# Patient Record
Sex: MALE | Race: WHITE | NOT HISPANIC OR LATINO | Employment: FULL TIME | URBAN - METROPOLITAN AREA
[De-identification: names, ages, dates, MRNs, and addresses within clinical notes are randomized per-mention and may not be internally consistent; named-entity substitution may affect disease eponyms.]

---

## 2017-04-19 ENCOUNTER — ALLSCRIPTS OFFICE VISIT (OUTPATIENT)
Dept: OTHER | Facility: OTHER | Age: 52
End: 2017-04-19

## 2017-05-03 ENCOUNTER — GENERIC CONVERSION - ENCOUNTER (OUTPATIENT)
Dept: OTHER | Facility: OTHER | Age: 52
End: 2017-05-03

## 2017-05-16 ENCOUNTER — LAB CONVERSION - ENCOUNTER (OUTPATIENT)
Dept: OTHER | Facility: OTHER | Age: 52
End: 2017-05-16

## 2017-05-16 LAB
DEPRECATED RDW RBC AUTO: 13.8 % (ref 11–15)
HCT VFR BLD AUTO: 45.8 % (ref 38.5–50)
HGB BLD-MCNC: 15.4 G/DL (ref 13.2–17.1)
MCH RBC QN AUTO: 29.3 PG (ref 27–33)
MCHC RBC AUTO-ENTMCNC: 33.7 G/DL (ref 32–36)
MCV RBC AUTO: 86.7 FL (ref 80–100)
PLATELET # BLD AUTO: 305 THOUSAND/UL (ref 140–400)
PMV BLD AUTO: 7.6 FL (ref 7.5–12.5)
RBC # BLD AUTO: 5.28 MILLION/UL (ref 4.2–5.8)
WBC # BLD AUTO: 5.7 THOUSAND/UL (ref 3.8–10.8)

## 2017-05-17 ENCOUNTER — LAB CONVERSION - ENCOUNTER (OUTPATIENT)
Dept: OTHER | Facility: OTHER | Age: 52
End: 2017-05-17

## 2017-05-17 LAB
ALT SERPL W P-5'-P-CCNC: 55 U/L (ref 9–46)
AST SERPL W P-5'-P-CCNC: 29 U/L (ref 10–35)
BUN SERPL-MCNC: 14 MG/DL (ref 7–25)
BUN/CREA RATIO (HISTORICAL): ABNORMAL (CALC) (ref 6–22)
CALCIUM SERPL-MCNC: 9.5 MG/DL (ref 8.6–10.3)
CHLORIDE SERPL-SCNC: 105 MMOL/L (ref 98–110)
CHOLEST SERPL-MCNC: 187 MG/DL (ref 125–200)
CHOLEST/HDLC SERPL: 3.4 (CALC)
CO2 SERPL-SCNC: 28 MMOL/L (ref 20–31)
CREAT SERPL-MCNC: 0.94 MG/DL (ref 0.7–1.33)
DEPRECATED RDW RBC AUTO: 13.8 % (ref 11–15)
EGFR AFRICAN AMERICAN (HISTORICAL): 108 ML/MIN/1.73M2
EGFR-AMERICAN CALC (HISTORICAL): 93 ML/MIN/1.73M2
GLUCOSE (HISTORICAL): 101 MG/DL (ref 65–99)
HCT VFR BLD AUTO: 45.8 % (ref 38.5–50)
HDLC SERPL-MCNC: 55 MG/DL
HGB BLD-MCNC: 15.4 G/DL (ref 13.2–17.1)
LDL CHOLESTEROL DIRECT (HISTORICAL): 111 MG/DL
MCH RBC QN AUTO: 29.3 PG (ref 27–33)
MCHC RBC AUTO-ENTMCNC: 33.7 G/DL (ref 32–36)
MCV RBC AUTO: 86.7 FL (ref 80–100)
NON-HDL-CHOL (CHOL-HDL) (HISTORICAL): 132 MG/DL (CALC)
PLATELET # BLD AUTO: 305 THOUSAND/UL (ref 140–400)
PMV BLD AUTO: 7.6 FL (ref 7.5–12.5)
POTASSIUM SERPL-SCNC: 4.6 MMOL/L (ref 3.5–5.3)
RBC # BLD AUTO: 5.28 MILLION/UL (ref 4.2–5.8)
SODIUM SERPL-SCNC: 142 MMOL/L (ref 135–146)
TRIGL SERPL-MCNC: 128 MG/DL
WBC # BLD AUTO: 5.7 THOUSAND/UL (ref 3.8–10.8)

## 2017-05-18 ENCOUNTER — GENERIC CONVERSION - ENCOUNTER (OUTPATIENT)
Dept: OTHER | Facility: OTHER | Age: 52
End: 2017-05-18

## 2017-05-18 LAB — LDL CHOLESTEROL DIRECT (HISTORICAL): 111

## 2017-08-22 ENCOUNTER — ALLSCRIPTS OFFICE VISIT (OUTPATIENT)
Dept: OTHER | Facility: OTHER | Age: 52
End: 2017-08-22

## 2017-08-22 DIAGNOSIS — D22.9 MELANOCYTIC NEVUS: ICD-10-CM

## 2017-09-22 ENCOUNTER — GENERIC CONVERSION - ENCOUNTER (OUTPATIENT)
Dept: OTHER | Facility: OTHER | Age: 52
End: 2017-09-22

## 2017-11-10 ENCOUNTER — LAB CONVERSION - ENCOUNTER (OUTPATIENT)
Dept: OTHER | Facility: OTHER | Age: 52
End: 2017-11-10

## 2017-11-10 LAB
ALT SERPL W P-5'-P-CCNC: 60 U/L (ref 9–46)
AST SERPL W P-5'-P-CCNC: 25 U/L (ref 10–35)
BUN SERPL-MCNC: 15 MG/DL (ref 7–25)
BUN/CREA RATIO (HISTORICAL): NORMAL (CALC) (ref 6–22)
CALCIUM SERPL-MCNC: 9.1 MG/DL (ref 8.6–10.3)
CHLORIDE SERPL-SCNC: 102 MMOL/L (ref 98–110)
CHOLEST SERPL-MCNC: 171 MG/DL
CHOLEST/HDLC SERPL: 3.4 (CALC)
CO2 SERPL-SCNC: 27 MMOL/L (ref 20–31)
CREAT SERPL-MCNC: 0.95 MG/DL (ref 0.7–1.33)
EGFR AFRICAN AMERICAN (HISTORICAL): 106 ML/MIN/1.73M2
EGFR-AMERICAN CALC (HISTORICAL): 92 ML/MIN/1.73M2
GLUCOSE (HISTORICAL): 90 MG/DL (ref 65–99)
HDLC SERPL-MCNC: 51 MG/DL
LDL CHOLESTEROL (HISTORICAL): 98 MG/DL (CALC)
NON-HDL-CHOL (CHOL-HDL) (HISTORICAL): 120 MG/DL (CALC)
POTASSIUM SERPL-SCNC: 4.2 MMOL/L (ref 3.5–5.3)
SODIUM SERPL-SCNC: 140 MMOL/L (ref 135–146)
TRIGL SERPL-MCNC: 123 MG/DL

## 2017-12-01 ENCOUNTER — GENERIC CONVERSION - ENCOUNTER (OUTPATIENT)
Dept: OTHER | Facility: OTHER | Age: 52
End: 2017-12-01

## 2017-12-01 DIAGNOSIS — R79.89 OTHER SPECIFIED ABNORMAL FINDINGS OF BLOOD CHEMISTRY: ICD-10-CM

## 2017-12-01 DIAGNOSIS — R06.83 SNORING: ICD-10-CM

## 2017-12-01 DIAGNOSIS — Z23 ENCOUNTER FOR IMMUNIZATION: ICD-10-CM

## 2017-12-01 DIAGNOSIS — D22.9 MELANOCYTIC NEVUS: ICD-10-CM

## 2017-12-01 DIAGNOSIS — I10 ESSENTIAL (PRIMARY) HYPERTENSION: ICD-10-CM

## 2017-12-01 DIAGNOSIS — E78.5 HYPERLIPIDEMIA: ICD-10-CM

## 2018-01-10 NOTE — RESULT NOTES
Message   tubular adenoma  Benign polyp  Colonoscopy in five years  Verified Results  (1) TISSUE EXAM [id number] 10:10AM [facility]     Test Name Result Flag Reference   LAB AP CASE REPORT (Report)     Surgical Pathology Report             Case: [id number]                   Authorizing Provider: Orly Villela MD      Collected:      05/09/2016 1010        Ordering Location:   [facility]    Received:      05/10/2016 [place] [address] Endoscopy                               Pathologist:      Eric Lopez MD                           Specimen:  Large Intestine, Transverse Colon, Transverse colon polyp   LAB AP FINAL DIAGNOSIS      A  Large Intestine, Transverse Colon, Transverse colon polyp:    - Tubular adenoma, fragmented  - Negative for high grade dysplasia and malignancy  LAB AP NOTE      Interpretation performed at [facility], [place] [address]   LAB AP SURGICAL ADDITIONAL INFORMATION (Report)     These tests were developed and their performance characteristics   determined by [facility] or [facility]  They may not be cleared or approved by the [place]  Food and   Drug Administration  The FDA has determined that such clearance or   approval is not necessary  These tests are used for clinical purposes  They should not be regarded as investigational or for research  This   laboratory has been approved by CLIA 88, designated as a high-complexity   laboratory and is qualified to perform these tests  LAB AP GROSS DESCRIPTION (Report)     A  The specimen is received in formalin, labeled with the patient's name   and hospital number, and is designated transverse colon polyp  The   specimen consists of 3 tan soft tissue fragments measuring 0 4, 0 4 and   0 6 cm  Entirely submitted  One cassette      Note: The estimated total formalin fixation time based upon information   provided by the submitting clinician and the standard processing schedule   is 27 75 hours      MAC

## 2018-01-12 VITALS
HEART RATE: 85 BPM | TEMPERATURE: 99 F | HEIGHT: 70 IN | DIASTOLIC BLOOD PRESSURE: 88 MMHG | WEIGHT: 210.25 LBS | SYSTOLIC BLOOD PRESSURE: 124 MMHG | OXYGEN SATURATION: 96 % | BODY MASS INDEX: 30.1 KG/M2

## 2018-01-13 VITALS
WEIGHT: 207.8 LBS | DIASTOLIC BLOOD PRESSURE: 80 MMHG | TEMPERATURE: 98.1 F | HEART RATE: 82 BPM | BODY MASS INDEX: 29.75 KG/M2 | SYSTOLIC BLOOD PRESSURE: 116 MMHG | OXYGEN SATURATION: 99 % | HEIGHT: 70 IN

## 2018-01-24 VITALS
BODY MASS INDEX: 30.35 KG/M2 | SYSTOLIC BLOOD PRESSURE: 114 MMHG | OXYGEN SATURATION: 98 % | DIASTOLIC BLOOD PRESSURE: 80 MMHG | HEART RATE: 82 BPM | HEIGHT: 70 IN | WEIGHT: 212 LBS | TEMPERATURE: 98.4 F

## 2018-04-12 ENCOUNTER — TRANSCRIBE ORDERS (OUTPATIENT)
Dept: ADMINISTRATIVE | Facility: HOSPITAL | Age: 53
End: 2018-04-12

## 2018-04-12 DIAGNOSIS — I10 ESSENTIAL HYPERTENSION, BENIGN: Primary | ICD-10-CM

## 2018-04-12 DIAGNOSIS — E78.5 HYPERLIPIDEMIA, UNSPECIFIED HYPERLIPIDEMIA TYPE: ICD-10-CM

## 2018-04-12 DIAGNOSIS — F51.4 SLEEP TERROR DISORDER: ICD-10-CM

## 2018-04-12 DIAGNOSIS — F51.02 TRANSIENT DISORDER OF INITIATING OR MAINTAINING SLEEP: Primary | ICD-10-CM

## 2018-04-12 RX ORDER — LOSARTAN POTASSIUM 100 MG/1
100 TABLET ORAL DAILY
Qty: 90 TABLET | Refills: 0 | Status: SHIPPED | OUTPATIENT
Start: 2018-04-12 | End: 2018-07-16 | Stop reason: SDUPTHER

## 2018-04-12 RX ORDER — ATORVASTATIN CALCIUM 20 MG/1
20 TABLET, FILM COATED ORAL DAILY
Qty: 90 TABLET | Refills: 0 | Status: SHIPPED | OUTPATIENT
Start: 2018-04-12 | End: 2018-07-16 | Stop reason: SDUPTHER

## 2018-05-11 LAB
ALT SERPL-CCNC: 68 U/L (ref 9–46)
AST SERPL-CCNC: 31 U/L (ref 10–35)
CHOLEST SERPL-MCNC: 169 MG/DL
CHOLEST/HDLC SERPL: 3.2 (CALC)
HAV IGM SERPL QL IA: NORMAL
HBV CORE IGM SERPL QL IA: NORMAL
HBV SURFACE AG SERPL QL IA: NORMAL
HCV AB S/CO SERPL IA: 0
HCV AB SERPL QL IA: NORMAL
HDLC SERPL-MCNC: 53 MG/DL
LDLC SERPL CALC-MCNC: 94 MG/DL (CALC)
NONHDLC SERPL-MCNC: 116 MG/DL (CALC)
TRIGL SERPL-MCNC: 119 MG/DL

## 2018-05-24 ENCOUNTER — HOSPITAL ENCOUNTER (OUTPATIENT)
Dept: SLEEP CENTER | Facility: CLINIC | Age: 53
Discharge: HOME | End: 2018-05-24
Payer: COMMERCIAL

## 2018-05-24 DIAGNOSIS — F51.02 TRANSIENT DISORDER OF INITIATING OR MAINTAINING SLEEP: ICD-10-CM

## 2018-05-24 DIAGNOSIS — F51.4 SLEEP TERROR DISORDER: ICD-10-CM

## 2018-05-24 PROCEDURE — 95810 POLYSOM 6/> YRS 4/> PARAM: CPT

## 2018-05-25 NOTE — PROGRESS NOTES
Sleep Study Documentation    Pre-Sleep Study       Sleep testing procedure explained to patient:YES    Patient napped prior to study:NO    Caffeine:Dayshift worker after 12PM   Caffeine use:NO    Alcohol:Dayshift workers after 5PM: Alcohol use:NO    Typical day for patient:YES       Study Documentation  Diagnostic   Snore: Moderate  Supplemental O2: no    O2 flow rate (L/min) range 0  O2 flow rate (L/min) final  0  Minimum SaO2 84%  Baseline SaO2 93%        Mode of Therapy:    EKG abnormalities: no    EEG abnormalities: no    Study Terminated:no    Patient classification: employed       Post-Sleep Study    Medication used at bedtime or during sleep study:NO    Patient reports time it took to fall asleep:30 to 60 minutes    Patient reports waking up during study:3 or more times  Patient reports returning to sleep without difficulty  Patient reports sleeping 6 to 8 hours with dreaming  Patient reports sleep during study:typical    Patient rated sleepiness: Somewhat sleepy or tired    PAP treatment:no

## 2018-05-27 DIAGNOSIS — G47.33 OSA (OBSTRUCTIVE SLEEP APNEA): Primary | ICD-10-CM

## 2018-05-31 ENCOUNTER — TELEPHONE (OUTPATIENT)
Dept: SLEEP CENTER | Facility: CLINIC | Age: 53
End: 2018-05-31

## 2018-05-31 NOTE — TELEPHONE ENCOUNTER
Needs CPAP study and consult with Dr Jaime Simmons
Spoke with patient regarding results of sleep study and scheduled CPAP study 8/2 and Consult with Dr Rick Zamudio and CPAP set up 8/23 
37.2

## 2018-06-25 ENCOUNTER — OFFICE VISIT (OUTPATIENT)
Dept: INTERNAL MEDICINE CLINIC | Age: 53
End: 2018-06-25
Payer: COMMERCIAL

## 2018-06-25 VITALS
OXYGEN SATURATION: 97 % | HEIGHT: 70 IN | SYSTOLIC BLOOD PRESSURE: 126 MMHG | BODY MASS INDEX: 30.52 KG/M2 | DIASTOLIC BLOOD PRESSURE: 80 MMHG | WEIGHT: 213.2 LBS | HEART RATE: 85 BPM | TEMPERATURE: 97.7 F

## 2018-06-25 DIAGNOSIS — E78.2 MIXED HYPERLIPIDEMIA: ICD-10-CM

## 2018-06-25 DIAGNOSIS — I10 HYPERTENSION, BENIGN: Primary | ICD-10-CM

## 2018-06-25 DIAGNOSIS — R79.89 ABNORMAL LFTS: ICD-10-CM

## 2018-06-25 PROCEDURE — 3008F BODY MASS INDEX DOCD: CPT | Performed by: INTERNAL MEDICINE

## 2018-06-25 PROCEDURE — 3074F SYST BP LT 130 MM HG: CPT | Performed by: INTERNAL MEDICINE

## 2018-06-25 PROCEDURE — 3079F DIAST BP 80-89 MM HG: CPT | Performed by: INTERNAL MEDICINE

## 2018-06-25 PROCEDURE — 99214 OFFICE O/P EST MOD 30 MIN: CPT | Performed by: INTERNAL MEDICINE

## 2018-06-25 RX ORDER — DIPHENOXYLATE HYDROCHLORIDE AND ATROPINE SULFATE 2.5; .025 MG/1; MG/1
1 TABLET ORAL DAILY
COMMUNITY

## 2018-06-25 RX ORDER — KRILL/OM-3/DHA/EPA/PHOSPHO/AST 500MG-86MG
1000 CAPSULE ORAL DAILY
COMMUNITY
End: 2022-01-05

## 2018-06-25 NOTE — PROGRESS NOTES
Assessment/Plan:    1  Hyperlipidemia   lipid profile is acceptable on atorvastatin 20 mg  Will continue same dose     2  Abnormal LFTs   will repeat the liver function test before next visit  And he is taking some over-the-counter herbal treatment advised to discontinue it and will repeat liver test before next visit   3  Essential hypertension   blood pressure is well controlled on losartan 100 mg daily     Diagnoses and all orders for this visit:    Hypertension, benign  -     Comprehensive metabolic panel; Future  -     CBC; Future    Abnormal LFTs  -     Comprehensive metabolic panel; Future    Mixed hyperlipidemia    Other orders  -     Krill Oil 500 MG CAPS; Take 500 mg by mouth daily  -     Milk Thistle 250 MG CAPS; Take 250 mg by mouth daily  -     Turmeric 1053 MG TABS; Take 1,000 mg by mouth daily  -     multivitamin (THERAGRAN) TABS; Take 1 tablet by mouth daily          Subjective:          Patient ID: Omkar Villa is a 48 y o  male  Hypertension   This is a chronic problem  The current episode started more than 1 year ago  The problem is controlled  Pertinent negatives include no anxiety, chest pain, headaches, malaise/fatigue, neck pain, palpitations, peripheral edema or shortness of breath  Risk factors for coronary artery disease include dyslipidemia and male gender  Past treatments include angiotensin blockers  There is no history of angina, CVA or PVD  There is no history of chronic renal disease  The following portions of the patient's history were reviewed and updated as appropriate: allergies, current medications, past family history, past medical history, past social history, past surgical history and problem list     Review of Systems   Constitutional: Negative for fatigue, fever and malaise/fatigue  HENT: Negative for congestion, ear discharge, ear pain, postnasal drip, sinus pressure, sore throat, tinnitus and trouble swallowing      Eyes: Negative for discharge, itching and visual disturbance  Respiratory: Negative for cough and shortness of breath  Cardiovascular: Negative for chest pain and palpitations  Gastrointestinal: Negative for abdominal pain, diarrhea, nausea and vomiting  Endocrine: Negative for cold intolerance and polyuria  Genitourinary: Negative for difficulty urinating, dysuria and urgency  Musculoskeletal: Negative for arthralgias and neck pain  Skin: Negative for rash  Allergic/Immunologic: Negative for environmental allergies  Neurological: Negative for dizziness, weakness and headaches  Psychiatric/Behavioral: Negative for agitation  The patient is not nervous/anxious  Past Medical History:   Diagnosis Date    Hypercholesteremia     Hypertension          Current Outpatient Prescriptions:     atorvastatin (LIPITOR) 20 mg tablet, Take 1 tablet (20 mg total) by mouth daily, Disp: 90 tablet, Rfl: 0    Krill Oil 500 MG CAPS, Take 500 mg by mouth daily, Disp: , Rfl:     losartan (COZAAR) 100 MG tablet, Take 1 tablet (100 mg total) by mouth daily, Disp: 90 tablet, Rfl: 0    Milk Thistle 250 MG CAPS, Take 250 mg by mouth daily, Disp: , Rfl:     multivitamin (THERAGRAN) TABS, Take 1 tablet by mouth daily, Disp: , Rfl:     Turmeric 1053 MG TABS, Take 1,000 mg by mouth daily, Disp: , Rfl:     No Known Allergies    Social History   Past Surgical History:   Procedure Laterality Date    ID COLONOSCOPY FLX DX W/COLLJ SPEC WHEN PFRMD N/A 5/9/2016    Procedure: COLONOSCOPY;  Surgeon: Yang Reynaga MD;  [facility]; Service: Gastroenterology    ID ESOPHAGOGASTRODUODENOSCOPY TRANSORAL DIAGNOSTIC N/A 5/9/2016    Procedure: ESOPHAGOGASTRODUODENOSCOPY (EGD); Surgeon: Yang Reynaga MD;  [facility];   Service: Gastroenterology    SKIN BIOPSY      Each additional lesion    WISDOM TOOTH EXTRACTION       Family History   Problem Relation Age of Onset    Hypertension Mother    Johnny Castro Leukemia Mother     Lung cancer Father     Hypertension Brother     Leukemia Family     Leukemia Family        Objective:  /80 (BP Location: Left arm, Patient Position: Sitting, Cuff Size: Adult)   Pulse 85   Temp 97 7 °F (36 5 °C) (Tympanic)   Ht 5' 10" (1 778 m)   Wt 96 7 kg (213 lb 3 2 oz)   SpO2 97%   BMI 30 59 kg/m²   Body mass index is 30 59 kg/m²  Physical Exam   Constitutional: He appears well-developed  HENT:   Head: Normocephalic  Right Ear: External ear normal    Left Ear: External ear normal    Mouth/Throat: Oropharynx is clear and moist    Eyes: Pupils are equal, round, and reactive to light  No scleral icterus  Neck: Normal range of motion  Neck supple  No tracheal deviation present  No thyromegaly present  Cardiovascular: Normal rate, regular rhythm and normal heart sounds  No murmur heard  Pulmonary/Chest: Effort normal and breath sounds normal  No respiratory distress  He exhibits no tenderness  Abdominal: Soft  Bowel sounds are normal  He exhibits no mass  There is no tenderness  Musculoskeletal: Normal range of motion  He exhibits no edema  Lymphadenopathy:     He has no cervical adenopathy  Neurological: He is alert  No cranial nerve deficit  Skin: Skin is warm  Psychiatric: He has a normal mood and affect   His behavior is normal

## 2018-07-11 DIAGNOSIS — I10 ESSENTIAL HYPERTENSION, BENIGN: ICD-10-CM

## 2018-07-11 DIAGNOSIS — E78.5 HYPERLIPIDEMIA, UNSPECIFIED HYPERLIPIDEMIA TYPE: ICD-10-CM

## 2018-07-12 RX ORDER — ATORVASTATIN CALCIUM 20 MG/1
20 TABLET, FILM COATED ORAL DAILY
Qty: 90 TABLET | Refills: 0 | OUTPATIENT
Start: 2018-07-12

## 2018-07-12 RX ORDER — LOSARTAN POTASSIUM 100 MG/1
100 TABLET ORAL DAILY
Qty: 90 TABLET | Refills: 0 | OUTPATIENT
Start: 2018-07-12

## 2018-07-16 DIAGNOSIS — I10 ESSENTIAL HYPERTENSION, BENIGN: ICD-10-CM

## 2018-07-16 DIAGNOSIS — E78.5 HYPERLIPIDEMIA, UNSPECIFIED HYPERLIPIDEMIA TYPE: ICD-10-CM

## 2018-07-16 RX ORDER — ATORVASTATIN CALCIUM 20 MG/1
20 TABLET, FILM COATED ORAL DAILY
Qty: 90 TABLET | Refills: 1 | Status: SHIPPED | OUTPATIENT
Start: 2018-07-16 | End: 2019-01-08 | Stop reason: SDUPTHER

## 2018-07-16 RX ORDER — LOSARTAN POTASSIUM 100 MG/1
100 TABLET ORAL DAILY
Qty: 90 TABLET | Refills: 1 | Status: SHIPPED | OUTPATIENT
Start: 2018-07-16 | End: 2019-01-25 | Stop reason: SDUPTHER

## 2018-08-02 ENCOUNTER — HOSPITAL ENCOUNTER (OUTPATIENT)
Dept: SLEEP CENTER | Facility: CLINIC | Age: 53
Discharge: HOME | End: 2018-08-02
Payer: COMMERCIAL

## 2018-08-02 DIAGNOSIS — G47.33 OSA (OBSTRUCTIVE SLEEP APNEA): ICD-10-CM

## 2018-08-02 PROCEDURE — 95811 POLYSOM 6/>YRS CPAP 4/> PARM: CPT

## 2018-08-04 ENCOUNTER — DOCUMENTATION (OUTPATIENT)
Dept: SLEEP CENTER | Facility: CLINIC | Age: 53
End: 2018-08-04

## 2018-08-04 DIAGNOSIS — G47.33 OSA (OBSTRUCTIVE SLEEP APNEA): Primary | ICD-10-CM

## 2018-08-07 DIAGNOSIS — G47.33 OSA (OBSTRUCTIVE SLEEP APNEA): Primary | ICD-10-CM

## 2018-08-09 ENCOUNTER — TELEPHONE (OUTPATIENT)
Dept: SLEEP CENTER | Facility: CLINIC | Age: 53
End: 2018-08-09

## 2018-08-09 NOTE — TELEPHONE ENCOUNTER
Left message for patient to keep consult on 8/23 as scheduled and to bring mask to appointment  Instructed to call if any questions    Rx and data to [facility]

## 2018-08-23 ENCOUNTER — OFFICE VISIT (OUTPATIENT)
Dept: SLEEP CENTER | Facility: CLINIC | Age: 53
End: 2018-08-23
Payer: COMMERCIAL

## 2018-08-23 VITALS
WEIGHT: 216.4 LBS | SYSTOLIC BLOOD PRESSURE: 110 MMHG | HEART RATE: 74 BPM | BODY MASS INDEX: 30.3 KG/M2 | HEIGHT: 71 IN | DIASTOLIC BLOOD PRESSURE: 72 MMHG

## 2018-08-23 DIAGNOSIS — G47.33 OSA (OBSTRUCTIVE SLEEP APNEA): Primary | ICD-10-CM

## 2018-08-23 PROCEDURE — 99204 OFFICE O/P NEW MOD 45 MIN: CPT | Performed by: INTERNAL MEDICINE

## 2018-08-23 NOTE — PROGRESS NOTES
Consultation - Sleep Center   Araceli Ramires : 1965  MRN: [id number]      Assessment:  The patient has severe obstructive sleep apnea which was effectively treated with CPAP of 11 cm  He will start using his device tonight and will work on full compliance  Plan:  Initiate CPAP 11 cm    Follow up:  Compliance check    History of Present Illness:   48 y o male with a history of loud snoring and excessive daytime sleepiness  He also has a history of hypertension controlled on medication  He underwent diagnostic polysomnography which demonstrated AHI = 31 8  He also had significant hypoxia on that study  A subsequent CPAP titration determined a best setting of 11 cm, which eliminated snoring, respiratory events and maintained oxygen saturation over 90%  He has frequent nocturia  He denies any headaches or dry mouth  He denies awakening with a choking sensation  He was diagnosed with night terrors, but that was a misunderstanding and he denies any disturbing dreams or abnormal behavioral activity at night      Review of Systems:      Genitourinary need to urinate more than twice a night   Cardiology none   Gastrointestinal none   Neurology none   Constitutional none   Integumentary none   Psychiatry none   Musculoskeletal back pain   Pulmonary snoring   ENT none   Endocrine none   Hematological none           Historical Information    Past Medical History:  Hypertension      Family History: non-contributory      Sleep Schedule: unremarkable    Snoring:    Yes      Witnessed Apnea:    No    Medications/Allergies:    Current Outpatient Prescriptions:     atorvastatin (LIPITOR) 20 mg tablet, Take 1 tablet (20 mg total) by mouth daily, Disp: 90 tablet, Rfl: 1    Krill Oil 500 MG CAPS, Take 500 mg by mouth daily, Disp: , Rfl:     losartan (COZAAR) 100 MG tablet, Take 1 tablet (100 mg total) by mouth daily, Disp: 90 tablet, Rfl: 1    Milk Thistle 250 MG CAPS, Take 250 mg by mouth daily, Disp: , Rfl:   multivitamin (THERAGRAN) TABS, Take 1 tablet by mouth daily, Disp: , Rfl:     Turmeric 1053 MG TABS, Take 1,000 mg by mouth daily, Disp: , Rfl:         No notes on file                  Objective:    Vital Signs:   Vitals:    08/23/18 1300   BP: 110/72   Pulse: 74   Weight: 98 2 kg (216 lb 6 4 oz)   Height: 5' 11" (1 803 m)     Neck Circumference: 41      [place] Sleepiness Scale: Total score: 3    Physical Exam:    General: Alert, appropriate, cooperative, overweight    Head: NC/AT, no retrognathia    Nose: No septal deviation, nares mildly obstructed, mucosa normal    Throat: Airway lumen narrowed, tongue base thickened, no tonsils visualized    Neck: Normal, no thyromegaly or lymphadenopathy, no JVD    Heart: RR, normal S1 and S2, no murmurs    Chest: Clear bilaterally    Extremity: No clubbing, cyanosis, no edema    Skin: Warm, dry    Neuro: No motor abnormalities, cranial nerves appear intact    Sleep Study Results:   As above  PAP Pressure: CPAP: 11 cm  DME Provider: [employer]    Counseling / Coordination of Care  Total clinic time spent today 25 minutes  Greater than 50% of total time was spent with the patient and / or family counseling and / or coordination of care  A description of the counseling / coordination of care: We discussed the pathophysiology of obstructive sleep apnea as well as the possible treatment options  We also discussed the rationale for positive airway pressure therapy  JOSÉ LUIS Oro    Board Certified Sleep Specialist

## 2018-10-24 LAB
ALBUMIN SERPL-MCNC: 4.8 G/DL (ref 3.6–5.1)
ALBUMIN/GLOB SERPL: 1.9 (CALC) (ref 1–2.5)
ALP SERPL-CCNC: 75 U/L (ref 40–115)
ALT SERPL-CCNC: 34 U/L (ref 9–46)
AST SERPL-CCNC: 19 U/L (ref 10–35)
BASOPHILS # BLD AUTO: 43 CELLS/UL (ref 0–200)
BASOPHILS NFR BLD AUTO: 0.7 %
BILIRUB SERPL-MCNC: 0.7 MG/DL (ref 0.2–1.2)
BUN SERPL-MCNC: 15 MG/DL (ref 7–25)
BUN/CREAT SERPL: NORMAL (CALC) (ref 6–22)
CALCIUM SERPL-MCNC: 9.6 MG/DL (ref 8.6–10.3)
CHLORIDE SERPL-SCNC: 102 MMOL/L (ref 98–110)
CO2 SERPL-SCNC: 28 MMOL/L (ref 20–32)
CREAT SERPL-MCNC: 0.93 MG/DL (ref 0.7–1.33)
EOSINOPHIL # BLD AUTO: 31 CELLS/UL (ref 15–500)
EOSINOPHIL NFR BLD AUTO: 0.5 %
ERYTHROCYTE [DISTWIDTH] IN BLOOD BY AUTOMATED COUNT: 12.7 % (ref 11–15)
GLOBULIN SER CALC-MCNC: 2.5 G/DL (CALC) (ref 1.9–3.7)
GLUCOSE SERPL-MCNC: 95 MG/DL (ref 65–99)
HCT VFR BLD AUTO: 46.3 % (ref 38.5–50)
HGB BLD-MCNC: 15.7 G/DL (ref 13.2–17.1)
LYMPHOCYTES # BLD AUTO: 1336 CELLS/UL (ref 850–3900)
LYMPHOCYTES NFR BLD AUTO: 21.9 %
MCH RBC QN AUTO: 29.9 PG (ref 27–33)
MCHC RBC AUTO-ENTMCNC: 33.9 G/DL (ref 32–36)
MCV RBC AUTO: 88.2 FL (ref 80–100)
MONOCYTES # BLD AUTO: 390 CELLS/UL (ref 200–950)
MONOCYTES NFR BLD AUTO: 6.4 %
NEUTROPHILS # BLD AUTO: 4301 CELLS/UL (ref 1500–7800)
NEUTROPHILS NFR BLD AUTO: 70.5 %
PLATELET # BLD AUTO: 311 THOUSAND/UL (ref 140–400)
PMV BLD REES-ECKER: 9.6 FL (ref 7.5–12.5)
POTASSIUM SERPL-SCNC: 4.2 MMOL/L (ref 3.5–5.3)
PROT SERPL-MCNC: 7.3 G/DL (ref 6.1–8.1)
RBC # BLD AUTO: 5.25 MILLION/UL (ref 4.2–5.8)
SL AMB EGFR AFRICAN AMERICAN: 108 ML/MIN/1.73M2
SL AMB EGFR NON AFRICAN AMERICAN: 93 ML/MIN/1.73M2
SODIUM SERPL-SCNC: 140 MMOL/L (ref 135–146)
WBC # BLD AUTO: 6.1 THOUSAND/UL (ref 3.8–10.8)

## 2018-10-29 ENCOUNTER — OFFICE VISIT (OUTPATIENT)
Dept: INTERNAL MEDICINE CLINIC | Age: 53
End: 2018-10-29
Payer: COMMERCIAL

## 2018-10-29 VITALS
SYSTOLIC BLOOD PRESSURE: 118 MMHG | BODY MASS INDEX: 31.04 KG/M2 | HEART RATE: 58 BPM | TEMPERATURE: 97.3 F | HEIGHT: 70 IN | OXYGEN SATURATION: 98 % | DIASTOLIC BLOOD PRESSURE: 86 MMHG | WEIGHT: 216.8 LBS

## 2018-10-29 DIAGNOSIS — I10 HYPERTENSION, BENIGN: Primary | ICD-10-CM

## 2018-10-29 DIAGNOSIS — E78.2 MIXED HYPERLIPIDEMIA: ICD-10-CM

## 2018-10-29 DIAGNOSIS — M25.561 ACUTE PAIN OF RIGHT KNEE: ICD-10-CM

## 2018-10-29 PROCEDURE — 3079F DIAST BP 80-89 MM HG: CPT | Performed by: INTERNAL MEDICINE

## 2018-10-29 PROCEDURE — 3074F SYST BP LT 130 MM HG: CPT | Performed by: INTERNAL MEDICINE

## 2018-10-29 PROCEDURE — 1036F TOBACCO NON-USER: CPT | Performed by: INTERNAL MEDICINE

## 2018-10-29 PROCEDURE — 3008F BODY MASS INDEX DOCD: CPT | Performed by: INTERNAL MEDICINE

## 2018-10-29 PROCEDURE — 99214 OFFICE O/P EST MOD 30 MIN: CPT | Performed by: INTERNAL MEDICINE

## 2018-11-05 ENCOUNTER — APPOINTMENT (OUTPATIENT)
Dept: RADIOLOGY | Age: 53
End: 2018-11-05
Payer: COMMERCIAL

## 2018-11-05 DIAGNOSIS — M25.561 ACUTE PAIN OF RIGHT KNEE: ICD-10-CM

## 2018-11-05 PROCEDURE — 73562 X-RAY EXAM OF KNEE 3: CPT

## 2018-12-05 ENCOUNTER — OFFICE VISIT (OUTPATIENT)
Dept: SLEEP CENTER | Facility: CLINIC | Age: 53
End: 2018-12-05
Payer: COMMERCIAL

## 2018-12-05 VITALS
BODY MASS INDEX: 31.7 KG/M2 | HEIGHT: 69 IN | SYSTOLIC BLOOD PRESSURE: 120 MMHG | HEART RATE: 71 BPM | DIASTOLIC BLOOD PRESSURE: 70 MMHG | WEIGHT: 214 LBS

## 2018-12-05 DIAGNOSIS — F51.04 PSYCHOPHYSIOLOGICAL INSOMNIA: Primary | ICD-10-CM

## 2018-12-05 PROCEDURE — 99214 OFFICE O/P EST MOD 30 MIN: CPT | Performed by: INTERNAL MEDICINE

## 2018-12-05 RX ORDER — ZOLPIDEM TARTRATE 3.5 MG/1
TABLET SUBLINGUAL
Qty: 30 TABLET | Refills: 1 | Status: SHIPPED | OUTPATIENT
Start: 2018-12-05 | End: 2019-08-29 | Stop reason: SDUPTHER

## 2018-12-05 NOTE — PROGRESS NOTES
Progress Note - Sleep Center   [facility][birth year] MRN: [id number]      Reason for Visit:  48 y o male here for PAP compliance check    Assessment:  Doing well with new PAP device  Sleep quality is improved and the patient reports less daytime sleepiness  Compliance data show utilization for greater than or equal to 70% of nights for greater than or equal to 4 hours per night  The patient has sleep maintenance insomnia for which I will prescribe Intermezzo    Plan:  Continue same  Increase humidification for dry mouth    Follow up: One year    History of Present Illness:  History of HORTENSIA on PAP therapy  Fully compliant and deriving benefit  Review of Systems      Genitourinary none   Cardiology none   Gastrointestinal none   Neurology none   Constitutional none   Integumentary itching   Psychiatry anxiety   Musculoskeletal back pain and sciatica   Pulmonary none   ENT none   Endocrine none   Hematological none         Historical Information    Past Medical History:   Past Medical History:   Diagnosis Date    Hypercholesteremia     Hypertension          Past Surgical History:   Past Surgical History:   Procedure Laterality Date    AL COLONOSCOPY FLX DX W/COLLJ SPEC WHEN PFRMD N/A 5/9/2016    Procedure: COLONOSCOPY;  Surgeon: Bessy Plasencia MD;  [facility]; Service: Gastroenterology    AL ESOPHAGOGASTRODUODENOSCOPY TRANSORAL DIAGNOSTIC N/A 5/9/2016    Procedure: ESOPHAGOGASTRODUODENOSCOPY (EGD); Surgeon: Bessy Plasencia MD;  [facility];   Service: Gastroenterology    SKIN BIOPSY      Each additional lesion    WISDOM TOOTH EXTRACTION               Family History:   Family History   Problem Relation Age of Onset    Hypertension Mother    Radha Mare Leukemia Mother     Lung cancer Father     Hypertension Brother     Leukemia Family     Leukemia Family        Medications/Allergies:      Current Outpatient Prescriptions:     atorvastatin (LIPITOR) 20 mg tablet, Take 1 tablet (20 mg total) by mouth daily, Disp: 90 tablet, Rfl: 1    Krill Oil 500 MG CAPS, Take 500 mg by mouth daily, Disp: , Rfl:     losartan (COZAAR) 100 MG tablet, Take 1 tablet (100 mg total) by mouth daily, Disp: 90 tablet, Rfl: 1    multivitamin (THERAGRAN) TABS, Take 1 tablet by mouth daily, Disp: , Rfl:     TURMERIC PO, Take 1,000 mg by mouth daily, Disp: , Rfl:       Objective    Vital Signs:   Vitals:    12/05/18 1300   BP: 120/70   Pulse: 71     [place] Sleepiness Scale: Total score: 3        Physical Exam:    General: Alert, appropriate, cooperative, overweight    Head: NC/AT    Skin: Warm, dry    Neuro: No motor abnormalities, cranial nerves appear intact    Extremity: No clubbing, cyanosis    PAP setting:   APAP 10 to 15 cm  DME Provider: [employer]    Counseling / Coordination of Care  Total clinic time spent today 15 minutes  A description of the counseling / coordination of care: Discussed equipment and response to treatment  JOSÉ LUIS Ivory    Board Certified Sleep Specialist

## 2019-01-08 DIAGNOSIS — E78.5 HYPERLIPIDEMIA, UNSPECIFIED HYPERLIPIDEMIA TYPE: ICD-10-CM

## 2019-01-08 RX ORDER — ATORVASTATIN CALCIUM 20 MG/1
20 TABLET, FILM COATED ORAL DAILY
Qty: 90 TABLET | Refills: 0 | Status: SHIPPED | OUTPATIENT
Start: 2019-01-08 | End: 2019-04-05 | Stop reason: SDUPTHER

## 2019-01-08 RX ORDER — ATORVASTATIN CALCIUM 20 MG/1
TABLET, FILM COATED ORAL
Qty: 90 TABLET | Refills: 1 | OUTPATIENT
Start: 2019-01-08

## 2019-01-15 DIAGNOSIS — I10 ESSENTIAL HYPERTENSION, BENIGN: ICD-10-CM

## 2019-01-16 RX ORDER — LOSARTAN POTASSIUM 100 MG/1
TABLET ORAL
Qty: 90 TABLET | Refills: 1 | OUTPATIENT
Start: 2019-01-16

## 2019-01-25 DIAGNOSIS — I10 ESSENTIAL HYPERTENSION, BENIGN: ICD-10-CM

## 2019-01-25 RX ORDER — LOSARTAN POTASSIUM 100 MG/1
100 TABLET ORAL DAILY
Qty: 90 TABLET | Refills: 1 | Status: SHIPPED | OUTPATIENT
Start: 2019-01-25 | End: 2019-04-05 | Stop reason: SDUPTHER

## 2019-03-29 LAB
ALT SERPL-CCNC: 54 U/L (ref 9–46)
AST SERPL-CCNC: 22 U/L (ref 10–35)
BUN SERPL-MCNC: 15 MG/DL (ref 7–25)
BUN/CREAT SERPL: ABNORMAL (CALC) (ref 6–22)
CALCIUM SERPL-MCNC: 9.5 MG/DL (ref 8.6–10.3)
CHLORIDE SERPL-SCNC: 102 MMOL/L (ref 98–110)
CHOLEST SERPL-MCNC: 175 MG/DL
CHOLEST/HDLC SERPL: 3.4 (CALC)
CO2 SERPL-SCNC: 31 MMOL/L (ref 20–32)
CREAT SERPL-MCNC: 1 MG/DL (ref 0.7–1.33)
GLUCOSE SERPL-MCNC: 100 MG/DL (ref 65–99)
HDLC SERPL-MCNC: 52 MG/DL
LDLC SERPL CALC-MCNC: 100 MG/DL (CALC)
NONHDLC SERPL-MCNC: 123 MG/DL (CALC)
POTASSIUM SERPL-SCNC: 4.7 MMOL/L (ref 3.5–5.3)
SL AMB EGFR AFRICAN AMERICAN: 99 ML/MIN/1.73M2
SL AMB EGFR NON AFRICAN AMERICAN: 86 ML/MIN/1.73M2
SODIUM SERPL-SCNC: 139 MMOL/L (ref 135–146)
TRIGL SERPL-MCNC: 126 MG/DL
URATE SERPL-MCNC: 5.3 MG/DL (ref 4–8)

## 2019-04-04 DIAGNOSIS — E78.5 HYPERLIPIDEMIA, UNSPECIFIED HYPERLIPIDEMIA TYPE: ICD-10-CM

## 2019-04-05 ENCOUNTER — OFFICE VISIT (OUTPATIENT)
Dept: INTERNAL MEDICINE CLINIC | Age: 54
End: 2019-04-05
Payer: COMMERCIAL

## 2019-04-05 VITALS
HEIGHT: 70 IN | HEART RATE: 60 BPM | TEMPERATURE: 97.2 F | OXYGEN SATURATION: 97 % | WEIGHT: 218 LBS | BODY MASS INDEX: 31.21 KG/M2 | SYSTOLIC BLOOD PRESSURE: 112 MMHG | DIASTOLIC BLOOD PRESSURE: 58 MMHG

## 2019-04-05 DIAGNOSIS — I10 HYPERTENSION, BENIGN: ICD-10-CM

## 2019-04-05 DIAGNOSIS — G47.33 OSA (OBSTRUCTIVE SLEEP APNEA): Primary | ICD-10-CM

## 2019-04-05 DIAGNOSIS — E78.5 HYPERLIPIDEMIA, UNSPECIFIED HYPERLIPIDEMIA TYPE: ICD-10-CM

## 2019-04-05 DIAGNOSIS — I10 ESSENTIAL HYPERTENSION, BENIGN: ICD-10-CM

## 2019-04-05 DIAGNOSIS — R73.9 HYPERGLYCEMIA: ICD-10-CM

## 2019-04-05 DIAGNOSIS — G47.61 PERIODIC LIMB MOVEMENTS OF SLEEP: ICD-10-CM

## 2019-04-05 PROCEDURE — 3008F BODY MASS INDEX DOCD: CPT | Performed by: INTERNAL MEDICINE

## 2019-04-05 PROCEDURE — 1036F TOBACCO NON-USER: CPT | Performed by: INTERNAL MEDICINE

## 2019-04-05 PROCEDURE — 3074F SYST BP LT 130 MM HG: CPT | Performed by: INTERNAL MEDICINE

## 2019-04-05 PROCEDURE — 3078F DIAST BP <80 MM HG: CPT | Performed by: INTERNAL MEDICINE

## 2019-04-05 PROCEDURE — 99214 OFFICE O/P EST MOD 30 MIN: CPT | Performed by: INTERNAL MEDICINE

## 2019-04-05 RX ORDER — ATORVASTATIN CALCIUM 20 MG/1
TABLET, FILM COATED ORAL
Qty: 90 TABLET | Refills: 0 | OUTPATIENT
Start: 2019-04-05

## 2019-04-05 RX ORDER — LOSARTAN POTASSIUM 100 MG/1
100 TABLET ORAL DAILY
Qty: 90 TABLET | Refills: 1 | Status: SHIPPED | OUTPATIENT
Start: 2019-04-05 | End: 2020-01-20 | Stop reason: SDUPTHER

## 2019-04-05 RX ORDER — ATORVASTATIN CALCIUM 20 MG/1
20 TABLET, FILM COATED ORAL DAILY
Qty: 90 TABLET | Refills: 1 | Status: SHIPPED | OUTPATIENT
Start: 2019-04-05 | End: 2019-10-09 | Stop reason: SDUPTHER

## 2019-08-21 LAB — HBA1C MFR BLD: 5.5 % OF TOTAL HGB

## 2019-08-29 ENCOUNTER — OFFICE VISIT (OUTPATIENT)
Dept: INTERNAL MEDICINE CLINIC | Facility: CLINIC | Age: 54
End: 2019-08-29
Payer: COMMERCIAL

## 2019-08-29 VITALS
HEART RATE: 81 BPM | TEMPERATURE: 98.8 F | DIASTOLIC BLOOD PRESSURE: 82 MMHG | BODY MASS INDEX: 32.58 KG/M2 | OXYGEN SATURATION: 96 % | SYSTOLIC BLOOD PRESSURE: 120 MMHG | HEIGHT: 69 IN | WEIGHT: 220 LBS

## 2019-08-29 DIAGNOSIS — F51.04 PSYCHOPHYSIOLOGICAL INSOMNIA: ICD-10-CM

## 2019-08-29 DIAGNOSIS — I10 HYPERTENSION, BENIGN: ICD-10-CM

## 2019-08-29 DIAGNOSIS — E78.2 MIXED HYPERLIPIDEMIA: ICD-10-CM

## 2019-08-29 DIAGNOSIS — R79.89 ABNORMAL LFTS: ICD-10-CM

## 2019-08-29 DIAGNOSIS — G47.33 OSA (OBSTRUCTIVE SLEEP APNEA): Primary | ICD-10-CM

## 2019-08-29 PROCEDURE — 99213 OFFICE O/P EST LOW 20 MIN: CPT | Performed by: INTERNAL MEDICINE

## 2019-08-29 RX ORDER — ZOLPIDEM TARTRATE 3.5 MG/1
TABLET SUBLINGUAL
Qty: 30 TABLET | Refills: 1 | Status: SHIPPED | OUTPATIENT
Start: 2019-08-29 | End: 2020-01-17 | Stop reason: SDUPTHER

## 2019-08-29 NOTE — PROGRESS NOTES
Assessment/Plan:    1  Hyperlipidemia  Will continue with present dose of Lipitor 20 mg daily  Will check lipid profile before next visit  Will also check liver function before next visit    2  Insomnia  Will continue with the Ambien 3 5 mg p r n     3  Essential hypertension  Blood pressure is acceptable on present dose of losartan 100 mg daily    4  Sleep apnea  Continue with CPAP  Patient is also being followed by sleep medicine  Diagnoses and all orders for this visit:    HORTENSIA (obstructive sleep apnea)    Psychophysiological insomnia  -     Zolpidem Tartrate 3 5 MG SUBL; Take 1/2 to 1 tablet sublingual nightly as needed    Hypertension, benign  -     Basic metabolic panel; Future    Mixed hyperlipidemia  -     Lipid Panel with Direct LDL reflex; Future  -     Basic metabolic panel; Future    Abnormal LFTs  -     ALT; Future  -     AST; Future          Subjective:          Patient ID: Corrinne Mountain is a 47 y o  male  Patient is here for regular follow-up  Still complain of sleep issues off and on  He is on CPAP now and he is using it regularly  He was prescribed low-dose Ambien by his sleep specialist   As per patient that does help him to some extent off and on  The following portions of the patient's history were reviewed and updated as appropriate: allergies, current medications, past family history, past medical history, past social history, past surgical history and problem list     Review of Systems   Constitutional: Negative for fatigue and fever  HENT: Negative for congestion, ear discharge, ear pain, postnasal drip, sinus pressure, sore throat, tinnitus and trouble swallowing  Eyes: Negative for discharge, itching and visual disturbance  Respiratory: Negative for cough and shortness of breath  Cardiovascular: Negative for chest pain and palpitations  Gastrointestinal: Negative for abdominal pain, diarrhea, nausea and vomiting     Endocrine: Negative for cold intolerance and polyuria  Genitourinary: Negative for difficulty urinating, dysuria and urgency  Musculoskeletal: Negative for arthralgias and neck pain  Skin: Negative for rash  Allergic/Immunologic: Negative for environmental allergies  Neurological: Negative for dizziness, weakness and headaches  Psychiatric/Behavioral: Positive for sleep disturbance  The patient is not nervous/anxious  Past Medical History:   Diagnosis Date    Hypercholesteremia     Hypertension          Current Outpatient Medications:     atorvastatin (LIPITOR) 20 mg tablet, Take 1 tablet (20 mg total) by mouth daily, Disp: 90 tablet, Rfl: 1    Krill Oil 500 MG CAPS, Take 500 mg by mouth daily, Disp: , Rfl:     losartan (COZAAR) 100 MG tablet, Take 1 tablet (100 mg total) by mouth daily, Disp: 90 tablet, Rfl: 1    multivitamin (THERAGRAN) TABS, Take 1 tablet by mouth daily, Disp: , Rfl:     TURMERIC PO, Take 1,000 mg by mouth daily, Disp: , Rfl:     Zolpidem Tartrate 3 5 MG SUBL, Take 1/2 to 1 tablet sublingual nightly as needed, Disp: 30 tablet, Rfl: 1    No Known Allergies    Social History   Past Surgical History:   Procedure Laterality Date    [place] COLONOSCOPY FLX DX W/COLLJ SPEC WHEN PFRMD N/A 5/9/2016    Procedure: COLONOSCOPY;  Surgeon: Leigh Marcos MD;  [facility]; Service: Gastroenterology    [place] ESOPHAGOGASTRODUODENOSCOPY TRANSORAL DIAGNOSTIC N/A 5/9/2016    Procedure: ESOPHAGOGASTRODUODENOSCOPY (EGD); Surgeon: Leigh Marcos MD;  [facility];   Service: Gastroenterology    SKIN BIOPSY      Each additional lesion    WISDOM TOOTH EXTRACTION       Family History   Problem Relation Age of Onset    Hypertension Mother    Deadra Yonatan Leukemia Mother     Lung cancer Father     Hypertension Brother     Leukemia Family     Leukemia Family        Objective:  /82 (BP Location: Left arm, Patient Position: Sitting, Cuff Size: Large)   Pulse 81   Temp 98 8 °F (37 1 °C) (Oral)   Ht 5' 9" (1 753 m)   Wt 99 8 kg (220 lb)   SpO2 96%   BMI 32 49 kg/m²   Body mass index is 32 49 kg/m²  Physical Exam   Constitutional: He appears well-developed  HENT:   Head: Normocephalic  Right Ear: External ear normal    Left Ear: External ear normal    Mouth/Throat: Oropharynx is clear and moist    Eyes: Pupils are equal, round, and reactive to light  No scleral icterus  Neck: Normal range of motion  Neck supple  No tracheal deviation present  No thyromegaly present  Cardiovascular: Normal rate, regular rhythm and normal heart sounds  Pulmonary/Chest: Effort normal and breath sounds normal  No respiratory distress  He exhibits no tenderness  Abdominal: Soft  Bowel sounds are normal  He exhibits no mass  There is no tenderness  Musculoskeletal: Normal range of motion  He exhibits no edema  Lymphadenopathy:     He has no cervical adenopathy  Neurological: He is alert  He displays normal reflexes  No cranial nerve deficit  Coordination normal    Skin: Skin is warm  Psychiatric: He has a normal mood and affect

## 2019-10-01 DIAGNOSIS — E78.5 HYPERLIPIDEMIA, UNSPECIFIED HYPERLIPIDEMIA TYPE: ICD-10-CM

## 2019-10-01 RX ORDER — ATORVASTATIN CALCIUM 20 MG/1
TABLET, FILM COATED ORAL
Qty: 90 TABLET | Refills: 1 | OUTPATIENT
Start: 2019-10-01

## 2019-10-09 DIAGNOSIS — E78.5 HYPERLIPIDEMIA, UNSPECIFIED HYPERLIPIDEMIA TYPE: ICD-10-CM

## 2019-10-09 RX ORDER — ATORVASTATIN CALCIUM 20 MG/1
20 TABLET, FILM COATED ORAL DAILY
Qty: 90 TABLET | Refills: 1 | Status: SHIPPED | OUTPATIENT
Start: 2019-10-09 | End: 2020-01-17 | Stop reason: SDUPTHER

## 2019-10-09 NOTE — TELEPHONE ENCOUNTER
MED: Atorvastatin 20mg  SUPPLY: 90Day  PHARMACY: [facility]   PATIENT PHONE #: [phone]  LAST OV: 8/29/2019  UPCOMING OV: 11/21/2019

## 2019-12-04 ENCOUNTER — OFFICE VISIT (OUTPATIENT)
Dept: SLEEP CENTER | Facility: CLINIC | Age: 54
End: 2019-12-04
Payer: COMMERCIAL

## 2019-12-04 VITALS
DIASTOLIC BLOOD PRESSURE: 62 MMHG | WEIGHT: 221 LBS | SYSTOLIC BLOOD PRESSURE: 120 MMHG | HEART RATE: 74 BPM | BODY MASS INDEX: 32.73 KG/M2 | HEIGHT: 69 IN

## 2019-12-04 DIAGNOSIS — I10 ESSENTIAL HYPERTENSION, BENIGN: ICD-10-CM

## 2019-12-04 DIAGNOSIS — F51.04 PSYCHOPHYSIOLOGICAL INSOMNIA: ICD-10-CM

## 2019-12-04 DIAGNOSIS — G47.33 OSA (OBSTRUCTIVE SLEEP APNEA): Primary | ICD-10-CM

## 2019-12-04 LAB
ALT SERPL-CCNC: 52 U/L (ref 9–46)
AST SERPL-CCNC: 29 U/L (ref 10–35)
BUN SERPL-MCNC: 15 MG/DL (ref 7–25)
BUN/CREAT SERPL: NORMAL (CALC) (ref 6–22)
CALCIUM SERPL-MCNC: 9.5 MG/DL (ref 8.6–10.3)
CHLORIDE SERPL-SCNC: 102 MMOL/L (ref 98–110)
CHOLEST SERPL-MCNC: 193 MG/DL
CHOLEST/HDLC SERPL: 3.6 (CALC)
CO2 SERPL-SCNC: 30 MMOL/L (ref 20–32)
CREAT SERPL-MCNC: 0.96 MG/DL (ref 0.7–1.33)
GLUCOSE SERPL-MCNC: 96 MG/DL (ref 65–99)
HDLC SERPL-MCNC: 53 MG/DL
LDLC SERPL CALC-MCNC: 110 MG/DL (CALC)
NONHDLC SERPL-MCNC: 140 MG/DL (CALC)
POTASSIUM SERPL-SCNC: 4.5 MMOL/L (ref 3.5–5.3)
SL AMB EGFR AFRICAN AMERICAN: 103 ML/MIN/1.73M2
SL AMB EGFR NON AFRICAN AMERICAN: 89 ML/MIN/1.73M2
SODIUM SERPL-SCNC: 140 MMOL/L (ref 135–146)
TRIGL SERPL-MCNC: 189 MG/DL

## 2019-12-04 PROCEDURE — 99213 OFFICE O/P EST LOW 20 MIN: CPT | Performed by: INTERNAL MEDICINE

## 2019-12-04 RX ORDER — LOSARTAN POTASSIUM 100 MG/1
TABLET ORAL
Qty: 90 TABLET | Refills: 1 | OUTPATIENT
Start: 2019-12-04

## 2019-12-04 NOTE — PROGRESS NOTES
Progress Note - Sleep Center   Jasmin Delgado GJX:[birth year] MRN: [id number]      Reason for Visit:  47 y o male here for annual follow-up    Assessment:  Doing well on current therapy of APAP 10 to 15 cm for severe HORTENSIA (EVITA = 31 8)  Plan:  Continue same  Try F 30 interface, since he is experiencing leak on the sides of his nose with a standard full face mask    Follow up: One year    History of Present Illness:  History of HORTENSIA on PAP therapy  Fully compliant and deriving benefit  He is using Intermezzo on a p r n  basis  He finds it helpful for when he awakens during the night with a feeling of anxiety causing insomnia  His primary physician is prescribing it for him  Review of Systems      Genitourinary none   Cardiology none   Gastrointestinal none   Neurology none   Constitutional none   Integumentary itching   Psychiatry anxiety and depression   Musculoskeletal back pain   Pulmonary none   ENT none   Endocrine none   Hematological none         I have reviewed and updated the review of systems as necessary      Historical Information    Past Medical History:   Past Medical History:   Diagnosis Date    Hypercholesteremia     Hypertension          Past Surgical History:   Past Surgical History:   Procedure Laterality Date    [place] COLONOSCOPY FLX DX W/COLLJ SPEC WHEN PFRMD N/A 5/9/2016    Procedure: COLONOSCOPY;  Surgeon: Evie Leo MD;  Location: [facility]; Service: Gastroenterology    [place] ESOPHAGOGASTRODUODENOSCOPY TRANSORAL DIAGNOSTIC N/A 5/9/2016    Procedure: ESOPHAGOGASTRODUODENOSCOPY (EGD); Surgeon: Evie Leo MD;  Location: [facility];   Service: Gastroenterology    SKIN BIOPSY      Each additional lesion    WISDOM TOOTH EXTRACTION         Social History:   Social History     Socioeconomic History    Marital status: Single     Spouse name: None    Number of children: None    Years of education: None    Highest education level: None   Occupational History    None   Social Needs    Financial resource strain: None    Food insecurity:     Worry: None     Inability: None    Transportation needs:     Medical: None     Non-medical: None   Tobacco Use    Smoking status: Never Smoker    Smokeless tobacco: Never Used   Substance and Sexual Activity    Alcohol use:  Yes     Alcohol/week: 3 0 standard drinks     Types: 3 Glasses of wine per week     Comment: 3 days per week (3 glasses of wine)    Drug use: No    Sexual activity: None   Lifestyle    Physical activity:     Days per week: None     Minutes per session: None    Stress: None   Relationships    Social connections:     Talks on phone: None     Gets together: None     Attends [employer] service: None     Active member of club or organization: None     Attends meetings of clubs or organizations: None     Relationship status: None    Intimate partner violence:     Fear of current or ex partner: None     Emotionally abused: None     Physically abused: None     Forced sexual activity: None   Other Topics Concern    None   Social History Narrative    Caffeine use       Family History:   Family History   Problem Relation Age of Onset    Hypertension Mother     Leukemia Mother     Lung cancer Father     Hypertension Brother     Leukemia Family     Leukemia Family        Medications/Allergies:      Current Outpatient Medications:     atorvastatin (LIPITOR) 20 mg tablet, Take 1 tablet (20 mg total) by mouth daily, Disp: 90 tablet, Rfl: 1    Krill Oil 500 MG CAPS, Take 500 mg by mouth daily, Disp: , Rfl:     losartan (COZAAR) 100 MG tablet, Take 1 tablet (100 mg total) by mouth daily, Disp: 90 tablet, Rfl: 1    multivitamin (THERAGRAN) TABS, Take 1 tablet by mouth daily, Disp: , Rfl:     TURMERIC PO, Take 1,000 mg by mouth daily, Disp: , Rfl:     Zolpidem Tartrate 3 5 MG SUBL, Take 1/2 to 1 tablet sublingual nightly as needed, Disp: 30 tablet, Rfl: 1          Objective      Vital Signs:   Vitals:    12/04/19 1400   BP: 120/62 Pulse: 74     [place] Sleepiness Scale: Total score: 1        Physical Exam:    General: Alert, appropriate, cooperative, overweight    Head: NC/AT    Skin: Warm, dry    Neuro: No motor abnormalities, cranial nerves appear intact    Extremity: No clubbing, cyanosis      DME Provider: [employer]        Counseling / Coordination of Care   I have spent 15 minutes with the patient today in which greater than 50% of this time was spent in counseling/coordination of care regarding: equipment and compliance  Board Certified Sleep Specialist    Portions of the record may have been created with voice recognition software  Occasional wrong word or "sound a like" substitutions may have occurred due to the inherent limitations of voice recognition software  Read the chart carefully and recognize, using context, where substitutions have occurred

## 2020-01-13 ENCOUNTER — TELEPHONE (OUTPATIENT)
Dept: SLEEP CENTER | Facility: CLINIC | Age: 55
End: 2020-01-13

## 2020-01-13 NOTE — TELEPHONE ENCOUNTER
Patient hasn't used CPAP for last 3 nights  He was diagnosed with URI last Sunday which has turned into a severe sinus infection He states the pain behind his eyes was very severe  He is concerned that the warm humidified air was making the sinus infection worse    Please advise if the humidified air can worsen a sinus infection

## 2020-01-15 NOTE — TELEPHONE ENCOUNTER
Humidified air can cause sinus infection if there are bacteria in the vapor  It is important that the humidifier chamber and the hose be kept clean

## 2020-01-17 ENCOUNTER — OFFICE VISIT (OUTPATIENT)
Dept: INTERNAL MEDICINE CLINIC | Age: 55
End: 2020-01-17
Payer: COMMERCIAL

## 2020-01-17 VITALS
DIASTOLIC BLOOD PRESSURE: 78 MMHG | WEIGHT: 217.8 LBS | SYSTOLIC BLOOD PRESSURE: 122 MMHG | HEART RATE: 61 BPM | BODY MASS INDEX: 31.18 KG/M2 | HEIGHT: 70 IN | TEMPERATURE: 98.4 F | OXYGEN SATURATION: 96 %

## 2020-01-17 DIAGNOSIS — F51.04 PSYCHOPHYSIOLOGICAL INSOMNIA: ICD-10-CM

## 2020-01-17 DIAGNOSIS — I10 HYPERTENSION, BENIGN: ICD-10-CM

## 2020-01-17 DIAGNOSIS — E78.5 HYPERLIPIDEMIA, UNSPECIFIED HYPERLIPIDEMIA TYPE: ICD-10-CM

## 2020-01-17 DIAGNOSIS — J40 BRONCHITIS: ICD-10-CM

## 2020-01-17 DIAGNOSIS — G47.33 OSA (OBSTRUCTIVE SLEEP APNEA): Primary | ICD-10-CM

## 2020-01-17 DIAGNOSIS — E78.2 MIXED HYPERLIPIDEMIA: ICD-10-CM

## 2020-01-17 PROCEDURE — 3008F BODY MASS INDEX DOCD: CPT | Performed by: INTERNAL MEDICINE

## 2020-01-17 PROCEDURE — 1036F TOBACCO NON-USER: CPT | Performed by: INTERNAL MEDICINE

## 2020-01-17 PROCEDURE — 99214 OFFICE O/P EST MOD 30 MIN: CPT | Performed by: INTERNAL MEDICINE

## 2020-01-17 PROCEDURE — 3074F SYST BP LT 130 MM HG: CPT | Performed by: INTERNAL MEDICINE

## 2020-01-17 PROCEDURE — 3078F DIAST BP <80 MM HG: CPT | Performed by: INTERNAL MEDICINE

## 2020-01-17 RX ORDER — ZOLPIDEM TARTRATE 3.5 MG/1
TABLET SUBLINGUAL
Qty: 30 TABLET | Refills: 1 | Status: SHIPPED | OUTPATIENT
Start: 2020-01-17 | End: 2020-04-07 | Stop reason: SDUPTHER

## 2020-01-17 RX ORDER — PREDNISONE 20 MG/1
20 TABLET ORAL DAILY
Qty: 5 TABLET | Refills: 0 | Status: SHIPPED | OUTPATIENT
Start: 2020-01-17 | End: 2020-05-29 | Stop reason: ALTCHOICE

## 2020-01-17 RX ORDER — ATORVASTATIN CALCIUM 40 MG/1
20 TABLET, FILM COATED ORAL DAILY
Qty: 90 TABLET | Refills: 1 | Status: SHIPPED | OUTPATIENT
Start: 2020-01-17 | End: 2020-05-29 | Stop reason: ALTCHOICE

## 2020-01-17 NOTE — PROGRESS NOTES
Assessment/Plan:    1  Resolving bronchitis  Due to rhonchi on lung examination I will prescribe prednisone 20 mg daily for 5 more days  2  Essential hypertension  Blood pressure is well controlled on losartan 100 mg daily    3  Hyperlipidemia  His LDL is still more than 100  Will increase atorvastatin 40 mg daily  4  Insomnia  He still need Ambien 3 5 mg sublingual on p r n  Basis  Will refill the prescription  There are no diagnoses linked to this encounter  Subjective:          Patient ID: Tacos Fernandez is a 47 y o  male  Patient is here for regular follow-up  Does have blood work done last month will like to discuss results  Presently he is also recovering from bronchitis  Just finished prednisone for 5 days about a week ago and yesterday was last day of amoxicillin which he took for 7 days  The following portions of the patient's history were reviewed and updated as appropriate: allergies, current medications, past family history, past medical history, past social history, past surgical history and problem list     Review of Systems   Constitutional: Negative for fatigue and fever  HENT: Negative for congestion, ear discharge, ear pain, postnasal drip, sinus pressure, sore throat, tinnitus and trouble swallowing  Eyes: Negative for discharge, itching and visual disturbance  Respiratory: Positive for cough  Negative for shortness of breath  Cardiovascular: Negative for chest pain and palpitations  Gastrointestinal: Negative for abdominal pain, diarrhea, nausea and vomiting  Endocrine: Negative for cold intolerance and polyuria  Genitourinary: Negative for difficulty urinating, dysuria and urgency  Musculoskeletal: Negative for arthralgias and neck pain  Skin: Negative for rash  Allergic/Immunologic: Negative for environmental allergies  Neurological: Negative for dizziness, weakness and headaches  Psychiatric/Behavioral: Negative for agitation   The patient is not nervous/anxious  Past Medical History:   Diagnosis Date    Hypercholesteremia     Hypertension          Current Outpatient Medications:     atorvastatin (LIPITOR) 20 mg tablet, Take 1 tablet (20 mg total) by mouth daily, Disp: 90 tablet, Rfl: 1    Krill Oil 500 MG CAPS, Take 500 mg by mouth daily, Disp: , Rfl:     losartan (COZAAR) 100 MG tablet, Take 1 tablet (100 mg total) by mouth daily, Disp: 90 tablet, Rfl: 1    multivitamin (THERAGRAN) TABS, Take 1 tablet by mouth daily, Disp: , Rfl:     TURMERIC PO, Take 1,000 mg by mouth daily, Disp: , Rfl:     Zolpidem Tartrate 3 5 MG SUBL, Take 1/2 to 1 tablet sublingual nightly as needed, Disp: 30 tablet, Rfl: 1    No Known Allergies    Social History   Past Surgical History:   Procedure Laterality Date    [place] COLONOSCOPY FLX DX W/COLLJ SPEC WHEN PFRMD N/A 5/9/2016    Procedure: COLONOSCOPY;  Surgeon: King Hopson MD;  [facility]; Service: Gastroenterology    [place] ESOPHAGOGASTRODUODENOSCOPY TRANSORAL DIAGNOSTIC N/A 5/9/2016    Procedure: ESOPHAGOGASTRODUODENOSCOPY (EGD); Surgeon: King Hopson MD;  [facility]; Service: Gastroenterology    SKIN BIOPSY      Each additional lesion    WISDOM TOOTH EXTRACTION       Family History   Problem Relation Age of Onset    Hypertension Mother    Russell Moose Leukemia Mother     Lung cancer Father     Hypertension Brother     Leukemia Family     Leukemia Family        Objective:  /78 (BP Location: Left arm, Patient Position: Sitting, Cuff Size: Adult)   Pulse 61   Temp 98 4 °F (36 9 °C) (Tympanic)   Ht 5' 9 69" (1 77 m)   Wt 98 8 kg (217 lb 12 8 oz)   SpO2 96%   BMI 31 53 kg/m²   Body mass index is 31 53 kg/m²  Physical Exam   Constitutional: He appears well-developed  HENT:   Head: Normocephalic  Right Ear: External ear normal    Left Ear: External ear normal    Mouth/Throat: Oropharynx is clear and moist    Eyes: Pupils are equal, round, and reactive to light   No scleral icterus  Neck: Normal range of motion  Neck supple  No tracheal deviation present  No thyromegaly present  Cardiovascular: Normal rate, regular rhythm and normal heart sounds  Pulmonary/Chest: Effort normal  No respiratory distress  He exhibits no tenderness  Scattered rhonchi at bases of lungs noted  Abdominal: Soft  Bowel sounds are normal  He exhibits no mass  There is no tenderness  Musculoskeletal: Normal range of motion  Lymphadenopathy:     He has no cervical adenopathy  Neurological: He is alert  No cranial nerve deficit  Skin: Skin is warm  Psychiatric: He has a normal mood and affect

## 2020-01-20 DIAGNOSIS — I10 ESSENTIAL HYPERTENSION, BENIGN: ICD-10-CM

## 2020-01-20 RX ORDER — LOSARTAN POTASSIUM 100 MG/1
100 TABLET ORAL DAILY
Qty: 90 TABLET | Refills: 1 | Status: SHIPPED | OUTPATIENT
Start: 2020-01-20 | End: 2020-07-22 | Stop reason: SDUPTHER

## 2020-03-25 DIAGNOSIS — E78.5 HYPERLIPIDEMIA, UNSPECIFIED HYPERLIPIDEMIA TYPE: ICD-10-CM

## 2020-03-29 RX ORDER — ATORVASTATIN CALCIUM 20 MG/1
TABLET, FILM COATED ORAL
Qty: 90 TABLET | Refills: 1 | OUTPATIENT
Start: 2020-03-29

## 2020-04-07 DIAGNOSIS — F51.04 PSYCHOPHYSIOLOGICAL INSOMNIA: ICD-10-CM

## 2020-04-08 RX ORDER — ZOLPIDEM TARTRATE 3.5 MG/1
TABLET SUBLINGUAL
Qty: 30 TABLET | Refills: 1 | Status: SHIPPED | OUTPATIENT
Start: 2020-04-08 | End: 2020-05-29 | Stop reason: SDUPTHER

## 2020-05-08 ENCOUNTER — TELEPHONE (OUTPATIENT)
Dept: INTERNAL MEDICINE CLINIC | Facility: CLINIC | Age: 55
End: 2020-05-08

## 2020-05-12 ENCOUNTER — TELEPHONE (OUTPATIENT)
Dept: INTERNAL MEDICINE CLINIC | Facility: CLINIC | Age: 55
End: 2020-05-12

## 2020-05-21 LAB
ALT SERPL-CCNC: 38 U/L (ref 9–46)
AST SERPL-CCNC: 20 U/L (ref 10–35)
CHOLEST SERPL-MCNC: 150 MG/DL
CHOLEST/HDLC SERPL: 3.4 (CALC)
HDLC SERPL-MCNC: 44 MG/DL
LDLC SERPL CALC-MCNC: 80 MG/DL (CALC)
NONHDLC SERPL-MCNC: 106 MG/DL (CALC)
TRIGL SERPL-MCNC: 166 MG/DL

## 2020-05-29 ENCOUNTER — OFFICE VISIT (OUTPATIENT)
Dept: INTERNAL MEDICINE CLINIC | Age: 55
End: 2020-05-29
Payer: COMMERCIAL

## 2020-05-29 VITALS
HEART RATE: 72 BPM | OXYGEN SATURATION: 97 % | DIASTOLIC BLOOD PRESSURE: 84 MMHG | SYSTOLIC BLOOD PRESSURE: 120 MMHG | BODY MASS INDEX: 31.3 KG/M2 | TEMPERATURE: 97.5 F | HEIGHT: 70 IN | WEIGHT: 218.6 LBS

## 2020-05-29 DIAGNOSIS — E78.2 MIXED HYPERLIPIDEMIA: Primary | ICD-10-CM

## 2020-05-29 DIAGNOSIS — I10 HYPERTENSION, BENIGN: ICD-10-CM

## 2020-05-29 DIAGNOSIS — G47.33 OSA (OBSTRUCTIVE SLEEP APNEA): ICD-10-CM

## 2020-05-29 DIAGNOSIS — R73.9 HYPERGLYCEMIA: ICD-10-CM

## 2020-05-29 DIAGNOSIS — F51.04 PSYCHOPHYSIOLOGICAL INSOMNIA: ICD-10-CM

## 2020-05-29 PROCEDURE — 3079F DIAST BP 80-89 MM HG: CPT | Performed by: INTERNAL MEDICINE

## 2020-05-29 PROCEDURE — 1036F TOBACCO NON-USER: CPT | Performed by: INTERNAL MEDICINE

## 2020-05-29 PROCEDURE — 3008F BODY MASS INDEX DOCD: CPT | Performed by: INTERNAL MEDICINE

## 2020-05-29 PROCEDURE — 99214 OFFICE O/P EST MOD 30 MIN: CPT | Performed by: INTERNAL MEDICINE

## 2020-05-29 PROCEDURE — 3074F SYST BP LT 130 MM HG: CPT | Performed by: INTERNAL MEDICINE

## 2020-05-29 RX ORDER — ZOLPIDEM TARTRATE 3.5 MG/1
TABLET SUBLINGUAL
Qty: 30 TABLET | Refills: 1 | Status: SHIPPED | OUTPATIENT
Start: 2020-05-29 | End: 2020-09-14 | Stop reason: SDUPTHER

## 2020-05-29 RX ORDER — ATORVASTATIN CALCIUM 40 MG/1
40 TABLET, FILM COATED ORAL DAILY
Qty: 90 TABLET | Refills: 1 | Status: SHIPPED | OUTPATIENT
Start: 2020-05-29 | End: 2020-11-24 | Stop reason: SDUPTHER

## 2020-05-29 RX ORDER — ATORVASTATIN CALCIUM 40 MG/1
40 TABLET, FILM COATED ORAL DAILY
COMMUNITY
End: 2020-05-29 | Stop reason: SDUPTHER

## 2020-07-15 DIAGNOSIS — I10 ESSENTIAL HYPERTENSION, BENIGN: ICD-10-CM

## 2020-07-15 RX ORDER — LOSARTAN POTASSIUM 100 MG/1
TABLET ORAL
Qty: 90 TABLET | Refills: 1 | OUTPATIENT
Start: 2020-07-15

## 2020-07-22 DIAGNOSIS — I10 ESSENTIAL HYPERTENSION, BENIGN: ICD-10-CM

## 2020-07-22 RX ORDER — LOSARTAN POTASSIUM 100 MG/1
100 TABLET ORAL DAILY
Qty: 90 TABLET | Refills: 1 | Status: SHIPPED | OUTPATIENT
Start: 2020-07-22 | End: 2021-01-14 | Stop reason: SDUPTHER

## 2020-08-25 DIAGNOSIS — E78.2 MIXED HYPERLIPIDEMIA: ICD-10-CM

## 2020-08-25 RX ORDER — ATORVASTATIN CALCIUM 40 MG/1
40 TABLET, FILM COATED ORAL DAILY
Qty: 90 TABLET | Refills: 1 | OUTPATIENT
Start: 2020-08-25

## 2020-09-14 DIAGNOSIS — F51.04 PSYCHOPHYSIOLOGICAL INSOMNIA: ICD-10-CM

## 2020-09-14 RX ORDER — ZOLPIDEM TARTRATE 3.5 MG/1
TABLET SUBLINGUAL
Qty: 30 TABLET | Refills: 1 | Status: SHIPPED | OUTPATIENT
Start: 2020-09-14 | End: 2020-10-27 | Stop reason: SDUPTHER

## 2020-10-02 ENCOUNTER — OFFICE VISIT (OUTPATIENT)
Dept: INTERNAL MEDICINE CLINIC | Age: 55
End: 2020-10-02
Payer: COMMERCIAL

## 2020-10-02 VITALS
OXYGEN SATURATION: 98 % | WEIGHT: 225 LBS | TEMPERATURE: 97.5 F | BODY MASS INDEX: 31.5 KG/M2 | HEIGHT: 71 IN | DIASTOLIC BLOOD PRESSURE: 76 MMHG | HEART RATE: 68 BPM | SYSTOLIC BLOOD PRESSURE: 126 MMHG

## 2020-10-02 DIAGNOSIS — U07.1 COVID-19 DETERMINED BY CLINICAL DIAGNOSTIC CRITERIA: ICD-10-CM

## 2020-10-02 DIAGNOSIS — I10 HYPERTENSION, BENIGN: ICD-10-CM

## 2020-10-02 DIAGNOSIS — G47.33 OSA (OBSTRUCTIVE SLEEP APNEA): ICD-10-CM

## 2020-10-02 DIAGNOSIS — Z23 NEED FOR INFLUENZA VACCINATION: Primary | ICD-10-CM

## 2020-10-02 DIAGNOSIS — M54.16 LUMBAR BACK PAIN WITH RADICULOPATHY AFFECTING RIGHT LOWER EXTREMITY: ICD-10-CM

## 2020-10-02 PROCEDURE — 99214 OFFICE O/P EST MOD 30 MIN: CPT | Performed by: INTERNAL MEDICINE

## 2020-10-02 PROCEDURE — 90471 IMMUNIZATION ADMIN: CPT

## 2020-10-02 PROCEDURE — 1036F TOBACCO NON-USER: CPT | Performed by: INTERNAL MEDICINE

## 2020-10-02 PROCEDURE — 3074F SYST BP LT 130 MM HG: CPT | Performed by: INTERNAL MEDICINE

## 2020-10-02 PROCEDURE — 90682 RIV4 VACC RECOMBINANT DNA IM: CPT

## 2020-10-02 PROCEDURE — 3078F DIAST BP <80 MM HG: CPT | Performed by: INTERNAL MEDICINE

## 2020-10-02 RX ORDER — MELOXICAM 15 MG/1
15 TABLET ORAL DAILY
Qty: 30 TABLET | Refills: 1 | Status: SHIPPED | OUTPATIENT
Start: 2020-10-02 | End: 2021-01-14 | Stop reason: HOSPADM

## 2020-10-02 RX ORDER — METHYLPREDNISOLONE 4 MG/1
TABLET ORAL
Qty: 21 EACH | Refills: 0 | Status: SHIPPED | OUTPATIENT
Start: 2020-10-02 | End: 2020-12-02 | Stop reason: ALTCHOICE

## 2020-10-06 ENCOUNTER — APPOINTMENT (OUTPATIENT)
Dept: RADIOLOGY | Age: 55
End: 2020-10-06
Payer: COMMERCIAL

## 2020-10-06 DIAGNOSIS — M54.16 LUMBAR BACK PAIN WITH RADICULOPATHY AFFECTING RIGHT LOWER EXTREMITY: ICD-10-CM

## 2020-10-06 PROCEDURE — 72110 X-RAY EXAM L-2 SPINE 4/>VWS: CPT

## 2020-10-09 ENCOUNTER — TELEPHONE (OUTPATIENT)
Dept: INTERNAL MEDICINE CLINIC | Facility: CLINIC | Age: 55
End: 2020-10-09

## 2020-10-27 DIAGNOSIS — F51.04 PSYCHOPHYSIOLOGICAL INSOMNIA: ICD-10-CM

## 2020-10-27 RX ORDER — ZOLPIDEM TARTRATE 3.5 MG/1
TABLET SUBLINGUAL
Qty: 30 TABLET | Refills: 1 | Status: SHIPPED | OUTPATIENT
Start: 2020-10-27 | End: 2021-01-27 | Stop reason: SDUPTHER

## 2020-10-28 ENCOUNTER — EVALUATION (OUTPATIENT)
Dept: PHYSICAL THERAPY | Facility: REHABILITATION | Age: 55
End: 2020-10-28
Payer: COMMERCIAL

## 2020-10-28 ENCOUNTER — APPOINTMENT (OUTPATIENT)
Dept: LAB | Age: 55
End: 2020-10-28
Payer: COMMERCIAL

## 2020-10-28 DIAGNOSIS — U07.1 COVID-19 DETERMINED BY CLINICAL DIAGNOSTIC CRITERIA: ICD-10-CM

## 2020-10-28 DIAGNOSIS — M54.16 LUMBAR BACK PAIN WITH RADICULOPATHY AFFECTING RIGHT LOWER EXTREMITY: ICD-10-CM

## 2020-10-28 PROCEDURE — 97110 THERAPEUTIC EXERCISES: CPT | Performed by: PHYSICAL THERAPIST

## 2020-10-28 PROCEDURE — 97161 PT EVAL LOW COMPLEX 20 MIN: CPT | Performed by: PHYSICAL THERAPIST

## 2020-10-28 PROCEDURE — 86769 SARS-COV-2 COVID-19 ANTIBODY: CPT

## 2020-10-28 PROCEDURE — 97112 NEUROMUSCULAR REEDUCATION: CPT | Performed by: PHYSICAL THERAPIST

## 2020-10-28 PROCEDURE — 36415 COLL VENOUS BLD VENIPUNCTURE: CPT

## 2020-10-29 LAB — SARS-COV-2 IGG+IGM SERPL QL IA: NORMAL

## 2020-11-04 ENCOUNTER — APPOINTMENT (OUTPATIENT)
Dept: PHYSICAL THERAPY | Facility: REHABILITATION | Age: 55
End: 2020-11-04
Payer: COMMERCIAL

## 2020-11-11 ENCOUNTER — OFFICE VISIT (OUTPATIENT)
Dept: PHYSICAL THERAPY | Facility: REHABILITATION | Age: 55
End: 2020-11-11
Payer: COMMERCIAL

## 2020-11-11 DIAGNOSIS — M54.16 LUMBAR BACK PAIN WITH RADICULOPATHY AFFECTING RIGHT LOWER EXTREMITY: Primary | ICD-10-CM

## 2020-11-11 PROCEDURE — 97112 NEUROMUSCULAR REEDUCATION: CPT | Performed by: PHYSICAL THERAPIST

## 2020-11-11 PROCEDURE — 97140 MANUAL THERAPY 1/> REGIONS: CPT | Performed by: PHYSICAL THERAPIST

## 2020-11-11 PROCEDURE — 97110 THERAPEUTIC EXERCISES: CPT | Performed by: PHYSICAL THERAPIST

## 2020-11-17 ENCOUNTER — APPOINTMENT (OUTPATIENT)
Dept: PHYSICAL THERAPY | Facility: REHABILITATION | Age: 55
End: 2020-11-17
Payer: COMMERCIAL

## 2020-11-24 DIAGNOSIS — E78.2 MIXED HYPERLIPIDEMIA: ICD-10-CM

## 2020-11-24 RX ORDER — ATORVASTATIN CALCIUM 40 MG/1
40 TABLET, FILM COATED ORAL DAILY
Qty: 90 TABLET | Refills: 0 | Status: SHIPPED | OUTPATIENT
Start: 2020-11-24 | End: 2021-02-26 | Stop reason: SDUPTHER

## 2020-11-28 ENCOUNTER — NURSE TRIAGE (OUTPATIENT)
Dept: OTHER | Facility: OTHER | Age: 55
End: 2020-11-28

## 2020-11-28 ENCOUNTER — TELEPHONE (OUTPATIENT)
Dept: OTHER | Facility: OTHER | Age: 55
End: 2020-11-28

## 2020-12-01 ENCOUNTER — TELEPHONE (OUTPATIENT)
Dept: INTERNAL MEDICINE CLINIC | Facility: CLINIC | Age: 55
End: 2020-12-01

## 2020-12-02 ENCOUNTER — TELEMEDICINE (OUTPATIENT)
Dept: INTERNAL MEDICINE CLINIC | Facility: CLINIC | Age: 55
End: 2020-12-02
Payer: COMMERCIAL

## 2020-12-02 VITALS — TEMPERATURE: 98.2 F

## 2020-12-02 DIAGNOSIS — U07.1 COVID-19 VIRUS INFECTION: Primary | ICD-10-CM

## 2020-12-02 PROCEDURE — 99213 OFFICE O/P EST LOW 20 MIN: CPT | Performed by: INTERNAL MEDICINE

## 2020-12-02 PROCEDURE — 1036F TOBACCO NON-USER: CPT | Performed by: INTERNAL MEDICINE

## 2021-01-14 ENCOUNTER — OFFICE VISIT (OUTPATIENT)
Dept: INTERNAL MEDICINE CLINIC | Facility: CLINIC | Age: 56
End: 2021-01-14
Payer: COMMERCIAL

## 2021-01-14 VITALS
OXYGEN SATURATION: 99 % | DIASTOLIC BLOOD PRESSURE: 78 MMHG | WEIGHT: 218 LBS | SYSTOLIC BLOOD PRESSURE: 124 MMHG | HEIGHT: 71 IN | RESPIRATION RATE: 20 BRPM | BODY MASS INDEX: 30.52 KG/M2 | HEART RATE: 65 BPM | TEMPERATURE: 99.2 F

## 2021-01-14 DIAGNOSIS — R73.9 HYPERGLYCEMIA: ICD-10-CM

## 2021-01-14 DIAGNOSIS — I10 ESSENTIAL HYPERTENSION, BENIGN: ICD-10-CM

## 2021-01-14 DIAGNOSIS — G47.33 OSA (OBSTRUCTIVE SLEEP APNEA): Primary | ICD-10-CM

## 2021-01-14 DIAGNOSIS — M54.2 NECK PAIN: ICD-10-CM

## 2021-01-14 DIAGNOSIS — E78.2 MIXED HYPERLIPIDEMIA: ICD-10-CM

## 2021-01-14 DIAGNOSIS — G54.2 CERVICAL NEUROPATHY: ICD-10-CM

## 2021-01-14 PROCEDURE — 99214 OFFICE O/P EST MOD 30 MIN: CPT | Performed by: INTERNAL MEDICINE

## 2021-01-14 PROCEDURE — 1036F TOBACCO NON-USER: CPT | Performed by: INTERNAL MEDICINE

## 2021-01-14 RX ORDER — FLUTICASONE PROPIONATE 50 MCG
1 SPRAY, SUSPENSION (ML) NASAL DAILY
COMMUNITY
End: 2022-01-05

## 2021-01-14 RX ORDER — LOSARTAN POTASSIUM 100 MG/1
100 TABLET ORAL DAILY
Qty: 90 TABLET | Refills: 1 | Status: SHIPPED | OUTPATIENT
Start: 2021-01-14 | End: 2021-07-20 | Stop reason: SDUPTHER

## 2021-01-14 RX ORDER — MELATONIN
1000 DAILY
COMMUNITY

## 2021-01-14 RX ORDER — GABAPENTIN 300 MG/1
300 CAPSULE ORAL 3 TIMES DAILY
Qty: 90 CAPSULE | Refills: 1 | Status: SHIPPED | OUTPATIENT
Start: 2021-01-14 | End: 2021-10-13 | Stop reason: HOSPADM

## 2021-01-14 RX ORDER — MULTIVIT WITH MINERALS/LUTEIN
1000 TABLET ORAL DAILY
COMMUNITY

## 2021-01-14 NOTE — PROGRESS NOTES
Assessment/Plan:    1  Neck pain/cervical radiculopathy  Will request the x-ray cervical spine  Will refer patient for physical therapy  Will also start him on gabapentin  Also advised to use Advil 2 tablet b i d  As needed  Follow-up in 6 week if no improvement will request MRI of cervical spines  2  Hyperlipidemia  Continue with present dose of statin  Will check lipid profile before next visit    3  Essential hypertension  Blood pressure is stable on present regimen  Diagnoses and all orders for this visit:    HORTENSIA (obstructive sleep apnea)    Essential hypertension, benign  -     losartan (COZAAR) 100 MG tablet; Take 1 tablet (100 mg total) by mouth daily  -     CBC; Future  -     Comprehensive metabolic panel; Future    Neck pain  -     Ambulatory referral to Physical Therapy; Future  -     XR spine cervical complete 4 or 5 vw non injury; Future    Cervical neuropathy  -     gabapentin (NEURONTIN) 300 mg capsule; Take 1 capsule (300 mg total) by mouth 3 (three) times a day  -     Ambulatory referral to Physical Therapy; Future  -     XR spine cervical complete 4 or 5 vw non injury; Future    Mixed hyperlipidemia  -     Lipid Panel with Direct LDL reflex; Future  -     Comprehensive metabolic panel; Future    Hyperglycemia    Other orders  -     fluticasone (FLONASE) 50 mcg/act nasal spray; 1 spray into each nostril daily  -     Ascorbic Acid (vitamin C) 1000 MG tablet; Take 1,000 mg by mouth daily  -     cholecalciferol (VITAMIN D3) 1,000 units tablet; Take 1,000 Units by mouth daily        BMI Counseling: Body mass index is 30 84 kg/m²  The BMI is above normal  Nutrition recommendations include decreasing portion sizes, encouraging healthy choices of fruits and vegetables, decreasing fast food intake, consuming healthier snacks and limiting drinks that contain sugar  Exercise recommendations include vigorous physical activity 75 minutes/week  No pharmacotherapy was ordered             Subjective: Patient ID: Catie Oquendo is a 54 y o  male  Patient here for follow up after covid infection  Patient feels he is still having mood swings, having difficulty concentrating, limited taste and smell, and sinus issues despite Flonase/claritin/allegra  Sinus congestion, post-nasal drip, waxing-waning taste/smell  Clear mucus, no blood  No cough  Some sinus pain  Left shoulder pain first noticed in October 2020  Now having numbness in pointer finger starting November  No trauma or injury  Patient works primarily at desk job  The following portions of the patient's history were reviewed and updated as appropriate: allergies, current medications, past family history, past medical history, past social history, past surgical history and problem list     Review of Systems   Constitutional: Negative for chills, fatigue and fever  HENT: Positive for congestion, postnasal drip and sinus pain  Negative for ear discharge, ear pain, rhinorrhea, sore throat, tinnitus and voice change  Occasional ear "pressure" that feels like water is in ear   Eyes: Negative  Respiratory: Negative  Some exertional dyspnea with walking 150 yards, tightness but chest pain   Cardiovascular: Negative  Gastrointestinal: Negative  Genitourinary: Negative  Neurological: Positive for numbness  Of left hand fingers   Psychiatric/Behavioral: Negative for agitation, behavioral problems and confusion           Past Medical History:   Diagnosis Date    COVID-19 11/28/2020    Hypercholesteremia     Hypertension          Current Outpatient Medications:     Ascorbic Acid (vitamin C) 1000 MG tablet, Take 1,000 mg by mouth daily, Disp: , Rfl:     atorvastatin (LIPITOR) 40 mg tablet, Take 1 tablet (40 mg total) by mouth daily, Disp: 90 tablet, Rfl: 0    cholecalciferol (VITAMIN D3) 1,000 units tablet, Take 1,000 Units by mouth daily, Disp: , Rfl:     fluticasone (FLONASE) 50 mcg/act nasal spray, 1 spray into each nostril daily, Disp: , Rfl:     Krill Oil 500 MG CAPS, Take 1,000 mg by mouth daily , Disp: , Rfl:     losartan (COZAAR) 100 MG tablet, Take 1 tablet (100 mg total) by mouth daily, Disp: 90 tablet, Rfl: 1    multivitamin (THERAGRAN) TABS, Take 1 tablet by mouth daily, Disp: , Rfl:     TURMERIC PO, Take 1,000 mg by mouth daily, Disp: , Rfl:     Zolpidem Tartrate 3 5 MG SUBL, Take 1 tablet sublingual at night as needed, Disp: 30 tablet, Rfl: 1    gabapentin (NEURONTIN) 300 mg capsule, Take 1 capsule (300 mg total) by mouth 3 (three) times a day, Disp: 90 capsule, Rfl: 1    No Known Allergies    Social History   Past Surgical History:   Procedure Laterality Date    [place] COLONOSCOPY FLX DX W/COLLJ SPEC WHEN PFRMD N/A 5/9/2016    Procedure: COLONOSCOPY;  Surgeon: Chloe Vo MD;  [facility]; Service: Gastroenterology    [place] ESOPHAGOGASTRODUODENOSCOPY TRANSORAL DIAGNOSTIC N/A 5/9/2016    Procedure: ESOPHAGOGASTRODUODENOSCOPY (EGD); Surgeon: Chloe Vo MD;  [facility]; Service: Gastroenterology    SKIN BIOPSY      Each additional lesion    WISDOM TOOTH EXTRACTION       Family History   Problem Relation Age of Onset    Hypertension Mother    Isaiah Flower Leukemia Mother     Lung cancer Father     Hypertension Brother     Leukemia Family     Leukemia Family        Objective:  /78 (BP Location: Right arm, Patient Position: Sitting, Cuff Size: Standard)   Pulse 65   Temp 99 2 °F (37 3 °C) (Tympanic)   Resp 20   Ht 5' 10 5" (1 791 m)   Wt 98 9 kg (218 lb)   SpO2 99%   BMI 30 84 kg/m²   Body mass index is 30 84 kg/m²  Physical Exam  Constitutional:       Appearance: He is well-developed  HENT:      Head: Normocephalic  Right Ear: External ear normal       Left Ear: External ear normal       Mouth/Throat:      Mouth: Mucous membranes are moist    Eyes:      General: No scleral icterus  Pupils: Pupils are equal, round, and reactive to light     Neck: Musculoskeletal: Normal range of motion and neck supple  Thyroid: No thyromegaly  Trachea: No tracheal deviation  Cardiovascular:      Rate and Rhythm: Normal rate and regular rhythm  Heart sounds: Normal heart sounds  Pulmonary:      Effort: Pulmonary effort is normal  No respiratory distress  Breath sounds: Normal breath sounds  Chest:      Chest wall: No tenderness  Abdominal:      General: Bowel sounds are normal       Palpations: Abdomen is soft  There is no mass  Tenderness: There is no abdominal tenderness  Musculoskeletal: Normal range of motion  Left shoulder: He exhibits tenderness and pain  He exhibits normal strength  Right lower leg: No edema  Left lower leg: No edema  Lymphadenopathy:      Cervical: No cervical adenopathy  Skin:     General: Skin is warm  Findings: No erythema  Neurological:      Mental Status: He is alert and oriented to person, place, and time  Cranial Nerves: No cranial nerve deficit  Psychiatric:         Mood and Affect: Mood normal          Behavior: Behavior normal          Thought Content:  Thought content normal          Judgment: Judgment normal

## 2021-01-27 ENCOUNTER — OFFICE VISIT (OUTPATIENT)
Dept: SLEEP CENTER | Facility: CLINIC | Age: 56
End: 2021-01-27
Payer: COMMERCIAL

## 2021-01-27 VITALS
DIASTOLIC BLOOD PRESSURE: 80 MMHG | BODY MASS INDEX: 31.08 KG/M2 | WEIGHT: 222 LBS | HEIGHT: 71 IN | SYSTOLIC BLOOD PRESSURE: 120 MMHG

## 2021-01-27 DIAGNOSIS — G47.33 OSA (OBSTRUCTIVE SLEEP APNEA): Primary | ICD-10-CM

## 2021-01-27 DIAGNOSIS — F51.04 PSYCHOPHYSIOLOGICAL INSOMNIA: ICD-10-CM

## 2021-01-27 PROCEDURE — 99214 OFFICE O/P EST MOD 30 MIN: CPT | Performed by: INTERNAL MEDICINE

## 2021-01-27 PROCEDURE — 3008F BODY MASS INDEX DOCD: CPT | Performed by: INTERNAL MEDICINE

## 2021-01-27 RX ORDER — ZOLPIDEM TARTRATE 1.75 MG/1
TABLET SUBLINGUAL
Qty: 60 TABLET | Refills: 5 | Status: SHIPPED | OUTPATIENT
Start: 2021-01-27 | End: 2021-07-06 | Stop reason: CLARIF

## 2021-01-27 NOTE — PROGRESS NOTES
Progress Note - Sleep Center   Dmitri Santizo [facility]:[birth year] MRN: [id number]      Reason for Visit:  54 y  o male here for annual follow-up    Assessment:  Doing well with his PAP device  Is set at APAP 10 to 15 cm  Sleep quality is improved and the patient feels less drowsy  He is complaining of worsening insomnia  Compliance data show utilization for greater than or equal to 70% of nights, for greater than or equal to 4 hours per night  He has psychophysiologic insomnia    Plan:  Adequate compliance and successful treatment    Change Intermezzo to 1 75 mg twice nightly    Follow up: One year    History of Present Illness:  History of HORTENSIA on PAP therapy  Meets adequate compliance  Review of Systems      Genitourinary none   Cardiology none   Gastrointestinal none   Neurology forgetfulness and poor concentration or confusion,    Constitutional none   Integumentary none   Psychiatry anxiety and mood change   Musculoskeletal none   Pulmonary none   ENT none   Endocrine none   Hematological none         I have reviewed and updated the review of systems as necessary    Historical Information    Past Medical History:   Diagnosis Date    COVID-19 11/28/2020    Hypercholesteremia     Hypertension          Past Surgical History:   Procedure Laterality Date    [place] COLONOSCOPY FLX DX W/COLLJ SPEC WHEN PFRMD N/A 5/9/2016    Procedure: COLONOSCOPY;  Surgeon: Paul Santizo MD;  [facility]; Service: Gastroenterology    [place] ESOPHAGOGASTRODUODENOSCOPY TRANSORAL DIAGNOSTIC N/A 5/9/2016    Procedure: ESOPHAGOGASTRODUODENOSCOPY (EGD); Surgeon: Paul Santizo MD;  [facility];   Service: Gastroenterology    SKIN BIOPSY      Each additional lesion    WISDOM TOOTH EXTRACTION           Social History     Socioeconomic History    Marital status: Single     Spouse name: None    Number of children: None    Years of education: None    Highest education level: None   Occupational History    None   Social Needs    Financial resource strain: None    Food insecurity     Worry: None     Inability: None    Transportation needs     Medical: None     Non-medical: None   Tobacco Use    Smoking status: Never Smoker    Smokeless tobacco: Never Used   Substance and Sexual Activity    Alcohol use:  Yes     Alcohol/week: 3 0 standard drinks     Types: 3 Glasses of wine per week     Comment: 2-3 nights per week (3 glasses of wine)    Drug use: No    Sexual activity: Not Currently   Lifestyle    Physical activity     Days per week: None     Minutes per session: None    Stress: None   Relationships    Social connections     Talks on phone: None     Gets together: None     Attends Buddhism service: None     Active member of club or organization: None     Attends meetings of clubs or organizations: None     Relationship status: None    Intimate partner violence     Fear of current or ex partner: None     Emotionally abused: None     Physically abused: None     Forced sexual activity: None   Other Topics Concern    None   Social History Narrative    Caffeine use         Family History   Problem Relation Age of Onset    Hypertension Mother     Leukemia Mother     Lung cancer Father     Hypertension Brother     Leukemia Family     Leukemia Family        Medications/Allergies:      Current Outpatient Medications:     Ascorbic Acid (vitamin C) 1000 MG tablet, Take 1,000 mg by mouth daily, Disp: , Rfl:     atorvastatin (LIPITOR) 40 mg tablet, Take 1 tablet (40 mg total) by mouth daily, Disp: 90 tablet, Rfl: 0    cholecalciferol (VITAMIN D3) 1,000 units tablet, Take 1,000 Units by mouth daily, Disp: , Rfl:     fluticasone (FLONASE) 50 mcg/act nasal spray, 1 spray into each nostril daily, Disp: , Rfl:     gabapentin (NEURONTIN) 300 mg capsule, Take 1 capsule (300 mg total) by mouth 3 (three) times a day, Disp: 90 capsule, Rfl: 1    Krill Oil 500 MG CAPS, Take 1,000 mg by mouth daily , Disp: , Rfl:     losartan (COZAAR) 100 MG tablet, Take 1 tablet (100 mg total) by mouth daily, Disp: 90 tablet, Rfl: 1    multivitamin (THERAGRAN) TABS, Take 1 tablet by mouth daily, Disp: , Rfl:     TURMERIC PO, Take 1,000 mg by mouth daily, Disp: , Rfl:     Zolpidem Tartrate 3 5 MG SUBL, Take 1 tablet sublingual at night as needed, Disp: 30 tablet, Rfl: 1      Objective    Vital Signs:   Vitals:    01/27/21 1536   BP: 120/80     [place] Sleepiness Scale: Total score: 4        Physical Exam:    General: Alert, appropriate, cooperative, overweight    Head: NC/AT    Skin: Warm, dry    Neuro: No motor abnormalities, cranial nerves appear intact    Extremity: No clubbing, cyanosis    PAP setting:   APAP 10 to 15 cm  DME Provider: [email or web address]  Test results:  EVITA = 31 8    Counseling / Coordination of Care  Total clinic time spent today 15 minutes  A description of the counseling / coordination of care: Maintain compliance  Discussed equipment  JOSÉ LUIS Montes    Board Certified Sleep Specialist

## 2021-02-14 DIAGNOSIS — E78.2 MIXED HYPERLIPIDEMIA: ICD-10-CM

## 2021-02-15 ENCOUNTER — APPOINTMENT (OUTPATIENT)
Dept: RADIOLOGY | Age: 56
End: 2021-02-15
Payer: COMMERCIAL

## 2021-02-15 DIAGNOSIS — G54.2 CERVICAL NEUROPATHY: ICD-10-CM

## 2021-02-15 DIAGNOSIS — M54.2 NECK PAIN: ICD-10-CM

## 2021-02-15 PROCEDURE — 72050 X-RAY EXAM NECK SPINE 4/5VWS: CPT

## 2021-02-19 RX ORDER — ATORVASTATIN CALCIUM 40 MG/1
TABLET, FILM COATED ORAL
Qty: 90 TABLET | Refills: 0 | OUTPATIENT
Start: 2021-02-19

## 2021-02-26 DIAGNOSIS — E78.2 MIXED HYPERLIPIDEMIA: ICD-10-CM

## 2021-02-26 RX ORDER — ATORVASTATIN CALCIUM 40 MG/1
40 TABLET, FILM COATED ORAL DAILY
Qty: 90 TABLET | Refills: 1 | Status: SHIPPED | OUTPATIENT
Start: 2021-02-26 | End: 2021-08-18 | Stop reason: SDUPTHER

## 2021-03-11 ENCOUNTER — OFFICE VISIT (OUTPATIENT)
Dept: INTERNAL MEDICINE CLINIC | Facility: CLINIC | Age: 56
End: 2021-03-11
Payer: COMMERCIAL

## 2021-03-11 VITALS
HEIGHT: 69 IN | BODY MASS INDEX: 32.61 KG/M2 | HEART RATE: 89 BPM | TEMPERATURE: 99.5 F | WEIGHT: 220.2 LBS | DIASTOLIC BLOOD PRESSURE: 86 MMHG | OXYGEN SATURATION: 99 % | SYSTOLIC BLOOD PRESSURE: 124 MMHG

## 2021-03-11 DIAGNOSIS — G47.33 OSA (OBSTRUCTIVE SLEEP APNEA): ICD-10-CM

## 2021-03-11 DIAGNOSIS — G54.2 CERVICAL NEUROPATHY: ICD-10-CM

## 2021-03-11 DIAGNOSIS — Z86.010 HISTORY OF COLON POLYPS: Primary | ICD-10-CM

## 2021-03-11 DIAGNOSIS — K57.90 DIVERTICULOSIS: ICD-10-CM

## 2021-03-11 DIAGNOSIS — R73.9 HYPERGLYCEMIA: ICD-10-CM

## 2021-03-11 DIAGNOSIS — I10 HYPERTENSION, BENIGN: ICD-10-CM

## 2021-03-11 DIAGNOSIS — R79.89 ABNORMAL LFTS: ICD-10-CM

## 2021-03-11 DIAGNOSIS — E78.2 MIXED HYPERLIPIDEMIA: ICD-10-CM

## 2021-03-11 PROCEDURE — 3079F DIAST BP 80-89 MM HG: CPT | Performed by: INTERNAL MEDICINE

## 2021-03-11 PROCEDURE — 3074F SYST BP LT 130 MM HG: CPT | Performed by: INTERNAL MEDICINE

## 2021-03-11 PROCEDURE — 1036F TOBACCO NON-USER: CPT | Performed by: INTERNAL MEDICINE

## 2021-03-11 PROCEDURE — 99214 OFFICE O/P EST MOD 30 MIN: CPT | Performed by: INTERNAL MEDICINE

## 2021-03-11 PROCEDURE — 3008F BODY MASS INDEX DOCD: CPT | Performed by: INTERNAL MEDICINE

## 2021-03-11 PROCEDURE — 3725F SCREEN DEPRESSION PERFORMED: CPT | Performed by: INTERNAL MEDICINE

## 2021-03-11 NOTE — PROGRESS NOTES
Assessment/Plan:    1  Cervical spine radiculopathy  X-ray results reviewed with patient  He did not started physical therapy yet advised him to start it as soon as possible  He can take Tylenol/Advil as needed    2  Hyperlipidemia  Triglycerides slightly elevated  Will continue with present regimen  Advised for better diet control and exercise  3  Hyperglycemia  Continue with the low sugar/low carb diet and exercise    4  Obstructive sleep apnea  Patient is using CPAP machine  Also being followed by sleep specialist         Diagnoses and all orders for this visit:    History of colon polyps  -     Ambulatory referral to Gastroenterology; Future    Diverticulosis  -     Ambulatory referral to Gastroenterology; Future    HORTENSIA (obstructive sleep apnea)    Hypertension, benign    Abnormal LFTs    Mixed hyperlipidemia    Hyperglycemia    Cervical neuropathy            Depression Screening and Follow-up Plan: Patient's depression screening was positive with a PHQ-2 score of 4  Their PHQ-9 score was 11  Clincally patient does not have depression  No treatment is required  Subjective:          Patient ID: Jahaira Delarosa is a 54 y o  male  The for regular follow-up  Does have blood work done and would like to discuss results  Also have x-ray cervical spines  The following portions of the patient's history were reviewed and updated as appropriate: allergies, current medications, past family history, past medical history, past social history, past surgical history and problem list     Review of Systems   Constitutional: Negative for fatigue and fever  HENT: Negative for congestion, ear discharge, ear pain, postnasal drip, sinus pressure, sore throat, tinnitus and trouble swallowing  Eyes: Negative for discharge, itching and visual disturbance  Respiratory: Negative for cough and shortness of breath  Cardiovascular: Negative for chest pain and palpitations     Gastrointestinal: Negative for abdominal pain, diarrhea, nausea and vomiting  Endocrine: Negative for cold intolerance and polyuria  Genitourinary: Negative for difficulty urinating, dysuria and urgency  Musculoskeletal: Positive for arthralgias  Negative for neck pain  Skin: Negative for rash  Allergic/Immunologic: Negative for environmental allergies  Neurological: Positive for numbness  Negative for dizziness, weakness and headaches  Psychiatric/Behavioral: Negative for agitation and behavioral problems  The patient is not nervous/anxious  Past Medical History:   Diagnosis Date    COVID-19 11/28/2020    Hypercholesteremia     Hypertension          Current Outpatient Medications:     Ascorbic Acid (vitamin C) 1000 MG tablet, Take 1,000 mg by mouth daily, Disp: , Rfl:     atorvastatin (LIPITOR) 40 mg tablet, Take 1 tablet (40 mg total) by mouth daily, Disp: 90 tablet, Rfl: 1    cholecalciferol (VITAMIN D3) 1,000 units tablet, Take 1,000 Units by mouth daily, Disp: , Rfl:     fluticasone (FLONASE) 50 mcg/act nasal spray, 1 spray into each nostril daily, Disp: , Rfl:     Krill Oil 500 MG CAPS, Take 1,000 mg by mouth daily , Disp: , Rfl:     losartan (COZAAR) 100 MG tablet, Take 1 tablet (100 mg total) by mouth daily, Disp: 90 tablet, Rfl: 1    multivitamin (THERAGRAN) TABS, Take 1 tablet by mouth daily, Disp: , Rfl:     TURMERIC PO, Take 1,000 mg by mouth daily, Disp: , Rfl:     Zolpidem Tartrate 1 75 MG SUBL, One at bedtime and one during the night as needed for insomnia, Disp: 60 tablet, Rfl: 5    gabapentin (NEURONTIN) 300 mg capsule, Take 1 capsule (300 mg total) by mouth 3 (three) times a day (Patient not taking: Reported on 3/11/2021), Disp: 90 capsule, Rfl: 1    No Known Allergies    Social History   Past Surgical History:   Procedure Laterality Date    MI COLONOSCOPY FLX DX W/COLLJ SPEC WHEN PFRMD N/A 5/9/2016    Procedure: COLONOSCOPY;  Surgeon: Jada Cartagena MD;  [facility];   Service: Gastroenterology    IN ESOPHAGOGASTRODUODENOSCOPY TRANSORAL DIAGNOSTIC N/A 5/9/2016    Procedure: ESOPHAGOGASTRODUODENOSCOPY (EGD); Surgeon: Aishwarya Rubio MD;  [facility]; Service: Gastroenterology    SKIN BIOPSY      Each additional lesion    WISDOM TOOTH EXTRACTION       Family History   Problem Relation Age of Onset    Hypertension Mother    Normie Glory Leukemia Mother     Lung cancer Father     Hypertension Brother     Leukemia Family     Leukemia Family        Objective:  /86   Pulse 89   Temp 99 5 °F (37 5 °C) (Tympanic)   Ht 5' 9 37" (1 762 m)   Wt 99 9 kg (220 lb 3 2 oz)   SpO2 99%   BMI 32 17 kg/m²   Body mass index is 32 17 kg/m²  Physical Exam  Constitutional:       Appearance: He is well-developed  HENT:      Head: Normocephalic  Right Ear: External ear normal  There is impacted cerumen  Left Ear: External ear normal    Eyes:      General: No scleral icterus  Pupils: Pupils are equal, round, and reactive to light  Neck:      Musculoskeletal: Normal range of motion and neck supple  Thyroid: No thyromegaly  Trachea: No tracheal deviation  Cardiovascular:      Rate and Rhythm: Normal rate and regular rhythm  Heart sounds: Normal heart sounds  Pulmonary:      Effort: Pulmonary effort is normal  No respiratory distress  Breath sounds: Normal breath sounds  Chest:      Chest wall: No tenderness  Abdominal:      General: Bowel sounds are normal       Palpations: Abdomen is soft  There is no mass  Tenderness: There is no abdominal tenderness  Musculoskeletal: Normal range of motion  Right lower leg: No edema  Left lower leg: No edema  Lymphadenopathy:      Cervical: No cervical adenopathy  Skin:     General: Skin is warm  Neurological:      Mental Status: He is alert and oriented to person, place, and time  Cranial Nerves: No cranial nerve deficit     Psychiatric:         Mood and Affect: Mood normal  Behavior: Behavior normal          Thought Content:  Thought content normal          Judgment: Judgment normal

## 2021-04-21 ENCOUNTER — IMMUNIZATIONS (OUTPATIENT)
Dept: FAMILY MEDICINE CLINIC | Facility: HOSPITAL | Age: 56
End: 2021-04-21

## 2021-04-21 DIAGNOSIS — Z23 ENCOUNTER FOR IMMUNIZATION: Primary | ICD-10-CM

## 2021-04-21 PROCEDURE — 91300 SARS-COV-2 / COVID-19 MRNA VACCINE (PFIZER-BIONTECH) 30 MCG: CPT

## 2021-04-21 PROCEDURE — 0001A SARS-COV-2 / COVID-19 MRNA VACCINE (PFIZER-BIONTECH) 30 MCG: CPT

## 2021-05-10 ENCOUNTER — CONSULT (OUTPATIENT)
Dept: GASTROENTEROLOGY | Facility: CLINIC | Age: 56
End: 2021-05-10
Payer: COMMERCIAL

## 2021-05-10 VITALS
HEART RATE: 98 BPM | DIASTOLIC BLOOD PRESSURE: 80 MMHG | HEIGHT: 69 IN | WEIGHT: 223.2 LBS | TEMPERATURE: 97.9 F | SYSTOLIC BLOOD PRESSURE: 120 MMHG | BODY MASS INDEX: 33.06 KG/M2

## 2021-05-10 DIAGNOSIS — Z12.11 COLON CANCER SCREENING: Primary | ICD-10-CM

## 2021-05-10 DIAGNOSIS — Z86.010 HISTORY OF COLON POLYPS: ICD-10-CM

## 2021-05-10 DIAGNOSIS — K57.90 DIVERTICULOSIS: ICD-10-CM

## 2021-05-10 DIAGNOSIS — G47.33 OSA (OBSTRUCTIVE SLEEP APNEA): ICD-10-CM

## 2021-05-10 PROBLEM — R79.89 ABNORMAL LFTS: Status: RESOLVED | Noted: 2017-12-01 | Resolved: 2021-05-10

## 2021-05-10 PROCEDURE — 3074F SYST BP LT 130 MM HG: CPT | Performed by: INTERNAL MEDICINE

## 2021-05-10 PROCEDURE — 99203 OFFICE O/P NEW LOW 30 MIN: CPT | Performed by: INTERNAL MEDICINE

## 2021-05-10 PROCEDURE — 1036F TOBACCO NON-USER: CPT | Performed by: INTERNAL MEDICINE

## 2021-05-10 PROCEDURE — 3079F DIAST BP 80-89 MM HG: CPT | Performed by: INTERNAL MEDICINE

## 2021-05-10 PROCEDURE — 3008F BODY MASS INDEX DOCD: CPT | Performed by: INTERNAL MEDICINE

## 2021-05-10 NOTE — PROGRESS NOTES
[facility] - Outpatient Consultation  Jessica Culver 54 y o  male MRN: [id number]  Encounter: [id number]          ASSESSMENT AND PLAN:      1  Colon cancer screening  2  Diverticulosis  3  History of colon polyps  - he had tubular adenoma removed 5 years ago  He is due for surveillance colonoscopy  Risks and benefit discussed  Bowel prep instruction for MiraLax and dulcolax prep given  - Colonoscopy; Future    4  HORTENSIA (obstructive sleep apnea)  Continue CPAP    ______________________________________________________________________    HPI:    59-year-old male with obstructive sleep apnea here for surveillance colonoscopy consult  He had colonoscopy 5 years ago and tubular adenoma was removed  He has no  GI symptoms  Denies abdominal pain, nausea, vomiting, diarrhea or family history of colon cancer  [place] His reflux symptoms improved after start wearing CPAP  REVIEW OF SYSTEMS:    CONSTITUTIONAL: Denies any fever, chills, rigors, and weight loss  HEENT: No earache or tinnitus  Denies hearing loss or visual disturbances  CARDIOVASCULAR: No chest pain or palpitations  RESPIRATORY: Denies any cough, hemoptysis, shortness of breath or dyspnea on exertion  GASTROINTESTINAL: As noted in the History of Present Illness  GENITOURINARY: No problems with urination  Denies any hematuria or dysuria  NEUROLOGIC: No dizziness or vertigo, denies headaches  MUSCULOSKELETAL: Denies any muscle or joint pain  SKIN: Denies skin rashes or itching  ENDOCRINE: Denies excessive thirst  Denies intolerance to heat or cold  PSYCHOSOCIAL: Denies depression or anxiety  Denies any recent memory loss         Historical Information   Past Medical History:   Diagnosis Date    COVID-19 11/28/2020    Hypercholesteremia     Hypertension      Past Surgical History:   Procedure Laterality Date    COLONOSCOPY      AR COLONOSCOPY FLX DX W/COLLJ SPEC WHEN PFRMD N/A 5/9/2016    Procedure: COLONOSCOPY; Surgeon: Tiffany Pascual MD;  [facility]; Service: Gastroenterology    VA ESOPHAGOGASTRODUODENOSCOPY TRANSORAL DIAGNOSTIC N/A 5/9/2016    Procedure: ESOPHAGOGASTRODUODENOSCOPY (EGD); Surgeon: Tiffany Pascual MD;  [facility]; Service: Gastroenterology    SKIN BIOPSY      Each additional lesion    UPPER GASTROINTESTINAL ENDOSCOPY      WISDOM TOOTH EXTRACTION       Social History   Social History     Substance and Sexual Activity   Alcohol Use Yes    Alcohol/week: 3 0 standard drinks    Types: 3 Glasses of wine per week    Frequency: 2-3 times a week    Drinks per session: 3 or 4    Binge frequency: Never    Comment: 3 nights per week      Social History     Substance and Sexual Activity   Drug Use No     Social History     Tobacco Use   Smoking Status Never Smoker   Smokeless Tobacco Never Used     Family History   Problem Relation Age of Onset    Hypertension Mother     Leukemia Mother     Lung cancer Father     Hypertension Brother     Leukemia Family     Leukemia Family        Meds/Allergies       Current Outpatient Medications:     Ascorbic Acid (vitamin C) 1000 MG tablet    atorvastatin (LIPITOR) 40 mg tablet    cholecalciferol (VITAMIN D3) 1,000 units tablet    fluticasone (FLONASE) 50 mcg/act nasal spray    Krill Oil 500 MG CAPS    losartan (COZAAR) 100 MG tablet    multivitamin (THERAGRAN) TABS    TURMERIC PO    Zolpidem Tartrate 1 75 MG SUBL    gabapentin (NEURONTIN) 300 mg capsule    No Known Allergies        Objective     Blood pressure 120/80, pulse 98, temperature 97 9 °F (36 6 °C), temperature source Tympanic, height 5' 9 37" (1 762 m), weight 101 kg (223 lb 3 2 oz)  Body mass index is 32 61 kg/m²          PHYSICAL EXAM:      General Appearance:   Alert, cooperative, no distress   HEENT:   Normocephalic, atraumatic, anicteric      Neck:  Supple, symmetrical, trachea midline   Lungs:   Clear to auscultation bilaterally; no rales, rhonchi or wheezing; respirations unlabored    Heart[de-identified]   Regular rate and rhythm; no murmur, rub, or gallop  Abdomen:   Soft, non-tender, non-distended; normal bowel sounds; no masses, no organomegaly    Genitalia:   Deferred    Rectal:   Deferred    Extremities:  No cyanosis, clubbing or edema    Pulses:  2+ and symmetric    Skin:  No jaundice, rashes, or lesions    Lymph nodes:  No palpable cervical lymphadenopathy        Lab Results:   No visits with results within 1 Day(s) from this visit  Latest known visit with results is:   Appointment on 10/28/2020   Component Date Value    SARS-CoV-2 Ab, Total (Ig* 10/28/2020 Non-Reactive          Radiology Results:   No results found

## 2021-05-10 NOTE — PATIENT INSTRUCTIONS
Elijah scheduled for Monday, 7/12/21, with Dr [facility]  Miralax/Dulcolax prep instructions provided to patient by Miki Coburn

## 2021-05-12 ENCOUNTER — IMMUNIZATIONS (OUTPATIENT)
Dept: FAMILY MEDICINE CLINIC | Facility: HOSPITAL | Age: 56
End: 2021-05-12

## 2021-05-12 ENCOUNTER — TELEPHONE (OUTPATIENT)
Dept: SLEEP CENTER | Facility: CLINIC | Age: 56
End: 2021-05-12

## 2021-05-12 DIAGNOSIS — Z23 ENCOUNTER FOR IMMUNIZATION: Primary | ICD-10-CM

## 2021-05-12 PROCEDURE — 0002A SARS-COV-2 / COVID-19 MRNA VACCINE (PFIZER-BIONTECH) 30 MCG: CPT

## 2021-05-12 PROCEDURE — 91300 SARS-COV-2 / COVID-19 MRNA VACCINE (PFIZER-BIONTECH) 30 MCG: CPT

## 2021-06-03 ENCOUNTER — TELEPHONE (OUTPATIENT)
Dept: SLEEP CENTER | Facility: CLINIC | Age: 56
End: 2021-06-03

## 2021-06-03 NOTE — TELEPHONE ENCOUNTER
Received voice message from patient  He received letter from his insurance stating that after 7/1/21, they will no longer cover Zolpidem Tartrate 1 75 MG SUBL  Covered medications will include doxepin, eszoplicone, ramelteon, zolpidem, zolpidem extended release and Belsomra  Patient states he will be picking up refill of Zolpidem Tartrate 1 75 MG SUBL on 6/14/21 but will need a new Rx after that  He states he was supposed to be taking 1 tablet at bedtime and 1 tablet during the night as needed but he has been taking 2 tablets at bedtime and states that has been working well for him  He is thinking he would like to try zolpidem extended release if Dr Lalit Mendosa agreeable  Patient will call back in July closer to when refill is needed  Request can be sent to Dr Lalit Mendosa at that time

## 2021-07-06 RX ORDER — ZOLPIDEM TARTRATE 3.5 MG/1
3.5 TABLET SUBLINGUAL
Qty: 30 TABLET | Refills: 5 | Status: CANCELLED | OUTPATIENT
Start: 2021-07-06

## 2021-07-06 NOTE — TELEPHONE ENCOUNTER
Patient called, states he has been taking 2 of the Zolpidem 1 75 mg at [facility] just to get to sleep  Asking if Dr Asia Deng could write for Zolpidem 3 5 mg at [facility] and see if insurance will cover  DR Asia Deng can you please review and sign script if appropriate

## 2021-07-07 DIAGNOSIS — F51.04 PSYCHOPHYSIOLOGICAL INSOMNIA: ICD-10-CM

## 2021-07-07 RX ORDER — ZOLPIDEM TARTRATE 3.5 MG/1
1 TABLET SUBLINGUAL
Qty: 30 TABLET | Refills: 5 | Status: SHIPPED | OUTPATIENT
Start: 2021-07-07 | End: 2021-07-23

## 2021-07-09 ENCOUNTER — TELEPHONE (OUTPATIENT)
Dept: GASTROENTEROLOGY | Facility: AMBULARY SURGERY CENTER | Age: 56
End: 2021-07-09

## 2021-07-12 ENCOUNTER — HOSPITAL ENCOUNTER (OUTPATIENT)
Dept: GASTROENTEROLOGY | Facility: AMBULARY SURGERY CENTER | Age: 56
Setting detail: OUTPATIENT SURGERY
Discharge: HOME | End: 2021-07-12
Attending: INTERNAL MEDICINE | Admitting: INTERNAL MEDICINE
Payer: COMMERCIAL

## 2021-07-12 ENCOUNTER — ANESTHESIA EVENT (OUTPATIENT)
Dept: GASTROENTEROLOGY | Facility: AMBULARY SURGERY CENTER | Age: 56
End: 2021-07-12

## 2021-07-12 ENCOUNTER — ANESTHESIA (OUTPATIENT)
Dept: GASTROENTEROLOGY | Facility: AMBULARY SURGERY CENTER | Age: 56
End: 2021-07-12

## 2021-07-12 VITALS
SYSTOLIC BLOOD PRESSURE: 135 MMHG | TEMPERATURE: 97.2 F | DIASTOLIC BLOOD PRESSURE: 88 MMHG | RESPIRATION RATE: 16 BRPM | HEART RATE: 69 BPM | OXYGEN SATURATION: 98 %

## 2021-07-12 DIAGNOSIS — Z12.11 COLON CANCER SCREENING: ICD-10-CM

## 2021-07-12 PROCEDURE — 88305 TISSUE EXAM BY PATHOLOGIST: CPT | Performed by: PATHOLOGY

## 2021-07-12 PROCEDURE — 45385 COLONOSCOPY W/LESION REMOVAL: CPT | Performed by: INTERNAL MEDICINE

## 2021-07-12 RX ORDER — PROPOFOL 10 MG/ML
INJECTION, EMULSION INTRAVENOUS CONTINUOUS PRN
Status: DISCONTINUED | OUTPATIENT
Start: 2021-07-12 | End: 2021-07-12

## 2021-07-12 RX ORDER — SODIUM CHLORIDE, SODIUM LACTATE, POTASSIUM CHLORIDE, CALCIUM CHLORIDE 600; 310; 30; 20 MG/100ML; MG/100ML; MG/100ML; MG/100ML
INJECTION, SOLUTION INTRAVENOUS CONTINUOUS PRN
Status: DISCONTINUED | OUTPATIENT
Start: 2021-07-12 | End: 2021-07-12

## 2021-07-12 RX ORDER — LIDOCAINE HYDROCHLORIDE 10 MG/ML
INJECTION, SOLUTION EPIDURAL; INFILTRATION; INTRACAUDAL; PERINEURAL AS NEEDED
Status: DISCONTINUED | OUTPATIENT
Start: 2021-07-12 | End: 2021-07-12

## 2021-07-12 RX ORDER — PROPOFOL 10 MG/ML
INJECTION, EMULSION INTRAVENOUS AS NEEDED
Status: DISCONTINUED | OUTPATIENT
Start: 2021-07-12 | End: 2021-07-12

## 2021-07-12 RX ADMIN — SODIUM CHLORIDE, SODIUM LACTATE, POTASSIUM CHLORIDE, AND CALCIUM CHLORIDE: .6; .31; .03; .02 INJECTION, SOLUTION INTRAVENOUS at 11:31

## 2021-07-12 RX ADMIN — PROPOFOL 60 MG: 10 INJECTION, EMULSION INTRAVENOUS at 11:48

## 2021-07-12 RX ADMIN — LIDOCAINE HYDROCHLORIDE 100 MG: 10 INJECTION, SOLUTION EPIDURAL; INFILTRATION; INTRACAUDAL at 11:34

## 2021-07-12 RX ADMIN — PROPOFOL 100 MG: 10 INJECTION, EMULSION INTRAVENOUS at 11:35

## 2021-07-12 RX ADMIN — PROPOFOL 120 MCG/KG/MIN: 10 INJECTION, EMULSION INTRAVENOUS at 11:34

## 2021-07-12 RX ADMIN — PROPOFOL 80 MG: 10 INJECTION, EMULSION INTRAVENOUS at 11:34

## 2021-07-12 NOTE — ANESTHESIA POSTPROCEDURE EVALUATION
Post-Op Assessment Note    CV Status:  Stable  Pain Score: 0    Pain management: adequate     Mental Status:  Alert and awake   Hydration Status:  Euvolemic   PONV Controlled:  Controlled   Airway Patency:  Patent      Post Op Vitals Reviewed: Yes      Staff: CRNA         No complications documented      BP (P) 116/85 (07/12/21 1154)    Temp (!) (P) 97 1 °F (36 2 °C) (07/12/21 1154)    Pulse (P) 80 (07/12/21 1154)   Resp (P) 16 (07/12/21 1154)    SpO2 (P) 96 % (07/12/21 1154)

## 2021-07-12 NOTE — H&P
History and Physical - [facility]  Chai Barnett 64 y o  male MRN: [id number]    HPI: Chai Barnett is a 64y o  year old male who presents for surveillance colonoscopy  He had tubular adenomas removed about 5 years ago  Review of Systems    Historical Information   Past Medical History:   Diagnosis Date    Colon polyp     COVID-19 11/28/2020    Hypercholesteremia     Hypertension      Past Surgical History:   Procedure Laterality Date    COLONOSCOPY      VA COLONOSCOPY FLX DX W/COLLJ SPEC WHEN PFRMD N/A 5/9/2016    Procedure: COLONOSCOPY;  Surgeon: Bhupendra Mercado MD;  [facility]; Service: Gastroenterology    VA ESOPHAGOGASTRODUODENOSCOPY TRANSORAL DIAGNOSTIC N/A 5/9/2016    Procedure: ESOPHAGOGASTRODUODENOSCOPY (EGD); Surgeon: Bhupendra Mercado MD;  [facility]; Service: Gastroenterology    SKIN BIOPSY      Each additional lesion    UPPER GASTROINTESTINAL ENDOSCOPY      WISDOM TOOTH EXTRACTION       Social History   Social History     Substance and Sexual Activity   Alcohol Use Yes    Alcohol/week: 3 0 standard drinks    Types: 3 Glasses of wine per week    Comment: 3 nights per week      Social History     Substance and Sexual Activity   Drug Use No     Social History     Tobacco Use   Smoking Status Never Smoker   Smokeless Tobacco Never Used     Family History   Problem Relation Age of Onset    Hypertension Mother     Leukemia Mother     Lung cancer Father     Hypertension Brother     Leukemia Family     Leukemia Family        Meds/Allergies     (Not in a hospital admission)      No Known Allergies    Objective     /82   Pulse 85   Temp (!) 97 1 °F (36 2 °C)   Resp 18   SpO2 98%       PHYSICAL EXAM    Gen: NAD  CV: RRR  CHEST: Clear  ABD: soft, NT/ND  EXT: no edema  Neuro: AAO      ASSESSMENT/PLAN:  This is a 64y o  year old male here for surveillance colonoscopy      PLAN:   Procedure:  Colonoscopy with biopsy and possible polypectomy

## 2021-07-12 NOTE — ANESTHESIA PREPROCEDURE EVALUATION
Procedure:  COLONOSCOPY    Relevant Problems   ANESTHESIA (within normal limits)      CARDIO   (+) Hyperlipidemia   (+) Hypertension, benign      PULMONARY   (+) HORTENSIA (obstructive sleep apnea) (using CPAP)   (-) Smoking   (-) URI (upper respiratory infection)    BMI 32    Physical Exam    Airway    Mallampati score: III  TM Distance: >3 FB  Neck ROM: full     Dental   No notable dental hx     Cardiovascular      Pulmonary      Other Findings       No recent labs    Anesthesia Plan  ASA Score- 2     Anesthesia Type- IV sedation with anesthesia with ASA Monitors  Additional Monitors:   Airway Plan:           Plan Factors-Exercise tolerance (METS): >4 METS  Chart reviewed  Existing labs reviewed  Patient summary reviewed  Patient is not a current smoker  Induction- intravenous  Postoperative Plan-     Informed Consent- Anesthetic plan and risks discussed with patient  I personally reviewed this patient with the CRNA  Discussed and agreed on the Anesthesia Plan with the CRNA  Zeny Monge

## 2021-07-16 ENCOUNTER — TELEPHONE (OUTPATIENT)
Dept: SLEEP CENTER | Facility: CLINIC | Age: 56
End: 2021-07-16

## 2021-07-16 NOTE — TELEPHONE ENCOUNTER
Patient left voice message stating he was told by [facility] pharmacy that the prescription for zolpidem 3 5mg SL was denied  He is wondering what can be done  Called [facility] pharmacy  After holding on line for over 15 minutes, left message on pharmacy voice mail requesting call back to discuss

## 2021-07-19 NOTE — TELEPHONE ENCOUNTER
Returned patient's voice message  Patient states Dr Raya Ruiz sent a refill of zolpidem 3 5 SL to pharmacy  This medication is not covered by his insurance  Per denial letter from his insurance company:  Covered medications include doxepin, eszoplicone, ramelteon, zolpidem, zolpidem extended release and Belsomra  Patient would like to try zolpidem extended release  States he only has 2 nights of his current medication left  Called [facility] and cancelled Rx for Zolpidem Tartrate 3 5 MG SUBL  Dr Raya Ruiz, please write prescription for zolpidem extended release for patient if agreeable  Send to [facility] on [address] in [place]

## 2021-07-20 DIAGNOSIS — I10 ESSENTIAL HYPERTENSION, BENIGN: ICD-10-CM

## 2021-07-20 RX ORDER — LOSARTAN POTASSIUM 100 MG/1
100 TABLET ORAL DAILY
Qty: 90 TABLET | Refills: 1 | Status: SHIPPED | OUTPATIENT
Start: 2021-07-20 | End: 2022-01-24 | Stop reason: SDUPTHER

## 2021-07-23 NOTE — TELEPHONE ENCOUNTER
Script sent  I spoke with patient, advised script sent, he is asking if it is cleared through insurance  I advised we will not know until the pharmacy processes script  I advised if he gets notification that it is denied he should call our office

## 2021-08-18 DIAGNOSIS — G47.00 INSOMNIA, UNSPECIFIED TYPE: ICD-10-CM

## 2021-08-18 DIAGNOSIS — E78.2 MIXED HYPERLIPIDEMIA: ICD-10-CM

## 2021-08-18 RX ORDER — ZOLPIDEM TARTRATE 6.25 MG/1
6.25 TABLET, FILM COATED, EXTENDED RELEASE ORAL
Qty: 30 TABLET | Refills: 0 | Status: CANCELLED | OUTPATIENT
Start: 2021-08-18

## 2021-08-18 RX ORDER — ATORVASTATIN CALCIUM 40 MG/1
TABLET, FILM COATED ORAL
Qty: 90 TABLET | Refills: 1 | OUTPATIENT
Start: 2021-08-18

## 2021-08-18 RX ORDER — ATORVASTATIN CALCIUM 40 MG/1
40 TABLET, FILM COATED ORAL DAILY
Qty: 90 TABLET | Refills: 1 | Status: SHIPPED | OUTPATIENT
Start: 2021-08-18 | End: 2022-02-15 | Stop reason: SDUPTHER

## 2021-08-18 RX ORDER — ZOLPIDEM TARTRATE 6.25 MG/1
6.25 TABLET, FILM COATED, EXTENDED RELEASE ORAL
Qty: 30 TABLET | Refills: 5 | Status: SHIPPED | OUTPATIENT
Start: 2021-08-18 | End: 2022-01-28

## 2021-08-18 NOTE — TELEPHONE ENCOUNTER
Patient called, requesting refill on Zolpidem 6 25 CR    Patient states that this medication is working well for him, better than all the other medications he has tried

## 2021-10-13 ENCOUNTER — OFFICE VISIT (OUTPATIENT)
Dept: INTERNAL MEDICINE CLINIC | Age: 56
End: 2021-10-13
Payer: COMMERCIAL

## 2021-10-13 VITALS
DIASTOLIC BLOOD PRESSURE: 82 MMHG | OXYGEN SATURATION: 97 % | SYSTOLIC BLOOD PRESSURE: 112 MMHG | TEMPERATURE: 99 F | HEIGHT: 70 IN | BODY MASS INDEX: 30.79 KG/M2 | HEART RATE: 68 BPM | WEIGHT: 215.1 LBS

## 2021-10-13 DIAGNOSIS — E78.2 MIXED HYPERLIPIDEMIA: ICD-10-CM

## 2021-10-13 DIAGNOSIS — Z23 NEED FOR INFLUENZA VACCINATION: ICD-10-CM

## 2021-10-13 DIAGNOSIS — G47.33 OSA (OBSTRUCTIVE SLEEP APNEA): Primary | ICD-10-CM

## 2021-10-13 DIAGNOSIS — I10 ESSENTIAL HYPERTENSION, BENIGN: ICD-10-CM

## 2021-10-13 PROCEDURE — 99214 OFFICE O/P EST MOD 30 MIN: CPT | Performed by: INTERNAL MEDICINE

## 2021-10-13 PROCEDURE — 90471 IMMUNIZATION ADMIN: CPT

## 2021-10-13 PROCEDURE — 90682 RIV4 VACC RECOMBINANT DNA IM: CPT

## 2021-10-13 PROCEDURE — 1036F TOBACCO NON-USER: CPT | Performed by: INTERNAL MEDICINE

## 2021-10-13 PROCEDURE — 3079F DIAST BP 80-89 MM HG: CPT | Performed by: INTERNAL MEDICINE

## 2021-10-13 PROCEDURE — 3008F BODY MASS INDEX DOCD: CPT | Performed by: INTERNAL MEDICINE

## 2021-10-13 PROCEDURE — 3074F SYST BP LT 130 MM HG: CPT | Performed by: INTERNAL MEDICINE

## 2021-10-13 PROCEDURE — 3725F SCREEN DEPRESSION PERFORMED: CPT | Performed by: INTERNAL MEDICINE

## 2021-11-09 ENCOUNTER — TELEPHONE (OUTPATIENT)
Dept: PSYCHIATRY | Facility: CLINIC | Age: 56
End: 2021-11-09

## 2021-12-03 ENCOUNTER — TELEPHONE (OUTPATIENT)
Dept: PSYCHIATRY | Facility: CLINIC | Age: 56
End: 2021-12-03

## 2021-12-09 ENCOUNTER — SOCIAL WORK (OUTPATIENT)
Dept: BEHAVIORAL/MENTAL HEALTH CLINIC | Facility: CLINIC | Age: 56
End: 2021-12-09
Payer: COMMERCIAL

## 2021-12-09 DIAGNOSIS — F33.1 MODERATE EPISODE OF RECURRENT MAJOR DEPRESSIVE DISORDER (HCC): Primary | ICD-10-CM

## 2021-12-09 DIAGNOSIS — F41.1 GENERALIZED ANXIETY DISORDER: ICD-10-CM

## 2021-12-09 PROCEDURE — 99404 PREV MED CNSL INDIV APPRX 60: CPT | Performed by: COUNSELOR

## 2021-12-30 ENCOUNTER — TELEPHONE (OUTPATIENT)
Dept: PSYCHIATRY | Facility: CLINIC | Age: 56
End: 2021-12-30

## 2022-01-04 ENCOUNTER — SOCIAL WORK (OUTPATIENT)
Dept: BEHAVIORAL/MENTAL HEALTH CLINIC | Facility: CLINIC | Age: 57
End: 2022-01-04
Payer: COMMERCIAL

## 2022-01-04 DIAGNOSIS — F41.1 GAD (GENERALIZED ANXIETY DISORDER): ICD-10-CM

## 2022-01-04 DIAGNOSIS — F33.1 MODERATE EPISODE OF RECURRENT MAJOR DEPRESSIVE DISORDER (HCC): Primary | ICD-10-CM

## 2022-01-04 PROCEDURE — 99404 PREV MED CNSL INDIV APPRX 60: CPT | Performed by: COUNSELOR
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
## 2022-01-05 ENCOUNTER — OFFICE VISIT (OUTPATIENT)
Dept: INTERNAL MEDICINE CLINIC | Age: 57
End: 2022-01-05
Payer: COMMERCIAL

## 2022-01-05 VITALS
SYSTOLIC BLOOD PRESSURE: 112 MMHG | TEMPERATURE: 97.2 F | BODY MASS INDEX: 30.67 KG/M2 | HEIGHT: 70 IN | OXYGEN SATURATION: 97 % | WEIGHT: 214.2 LBS | DIASTOLIC BLOOD PRESSURE: 72 MMHG | HEART RATE: 67 BPM

## 2022-01-05 DIAGNOSIS — E78.2 MIXED HYPERLIPIDEMIA: ICD-10-CM

## 2022-01-05 DIAGNOSIS — G47.33 OSA (OBSTRUCTIVE SLEEP APNEA): Primary | ICD-10-CM

## 2022-01-05 DIAGNOSIS — I10 HYPERTENSION, BENIGN: ICD-10-CM

## 2022-01-05 DIAGNOSIS — F41.9 ANXIETY: ICD-10-CM

## 2022-01-05 PROCEDURE — 3074F SYST BP LT 130 MM HG: CPT | Performed by: INTERNAL MEDICINE

## 2022-01-05 PROCEDURE — 99213 OFFICE O/P EST LOW 20 MIN: CPT | Performed by: INTERNAL MEDICINE

## 2022-01-05 PROCEDURE — 3078F DIAST BP <80 MM HG: CPT | Performed by: INTERNAL MEDICINE

## 2022-01-05 NOTE — PROGRESS NOTES
Assessment/Plan:    1  Anxiety  Presently undergoing therapy and being managed by Behavioral Health/Psychiatry Department  Advised him that any further paperwork for FMLA are prolong leave from work will be through psychiatry department  Presently he is not on any medicine and is reluctant to start any medicine as per discussion today  Advised to continue follow-up with Behavioral Health  2  Essential hypertension  Blood pressure is stable on present regimen    3  Hyperlipidemia  Continue with Lipitor 40 mg daily  Continue with low-fat diet  Follow-up  Keep next scheduled appointment and do blood work prior to visit  Diagnoses and all orders for this visit:    HORTENSIA (obstructive sleep apnea)    Hypertension, benign    Mixed hyperlipidemia    Anxiety          BMI Counseling: Body mass index is 30 91 kg/m²  The BMI is above normal  Nutrition recommendations include decreasing portion sizes, encouraging healthy choices of fruits and vegetables, decreasing fast food intake, consuming healthier snacks and limiting drinks that contain sugar  Exercise recommendations include vigorous physical activity 75 minutes/week and exercising 3-5 times per week  No pharmacotherapy was ordered  Rationale for BMI follow-up plan is due to patient being overweight or obese  Subjective:          Patient ID: Summer Venegas is a 64 y o  male  Patient came to office to discuss paperwork to be filled for FMLA for about 13 weeks time of  Presently patient is undergoing Behavioral Health/psychiatry evaluation and management  Patient is also undergoing therapy session  At present he does not know his the diagnosis  The following portions of the patient's history were reviewed and updated as appropriate: allergies, current medications, past family history, past medical history, past social history, past surgical history and problem list     Review of Systems   Constitutional: Negative for fatigue and fever     HENT: Negative for congestion, ear discharge, ear pain, postnasal drip, sinus pressure, sore throat, tinnitus and trouble swallowing  Eyes: Negative for discharge, itching and visual disturbance  Respiratory: Negative for cough and shortness of breath  Cardiovascular: Negative for chest pain and palpitations  Gastrointestinal: Negative for abdominal pain, diarrhea, nausea and vomiting  Endocrine: Negative for cold intolerance and polyuria  Genitourinary: Negative for difficulty urinating, dysuria and urgency  Musculoskeletal: Negative for arthralgias and neck pain  Skin: Negative for rash  Allergic/Immunologic: Negative for environmental allergies  Neurological: Negative for dizziness, weakness and headaches  Psychiatric/Behavioral: Negative for agitation  The patient is nervous/anxious            Past Medical History:   Diagnosis Date    Colon polyp     COVID-19 11/28/2020    Hypercholesteremia     Hypertension          Current Outpatient Medications:     Ascorbic Acid (vitamin C) 1000 MG tablet, Take 1,000 mg by mouth daily, Disp: , Rfl:     atorvastatin (LIPITOR) 40 mg tablet, Take 1 tablet (40 mg total) by mouth daily, Disp: 90 tablet, Rfl: 1    cholecalciferol (VITAMIN D3) 1,000 units tablet, Take 1,000 Units by mouth daily, Disp: , Rfl:     FIBER ADULT GUMMIES PO, Take by mouth 5 grams daily, Disp: , Rfl:     losartan (COZAAR) 100 MG tablet, Take 1 tablet (100 mg total) by mouth daily, Disp: 90 tablet, Rfl: 1    multivitamin (THERAGRAN) TABS, Take 1 tablet by mouth daily, Disp: , Rfl:     Omega-3 Fatty Acids (OMEGA-3 FISH OIL PO), Take 1,080 mL by mouth daily, Disp: , Rfl:     zolpidem (AMBIEN CR) 6 25 MG CR tablet, Take 1 tablet (6 25 mg total) by mouth daily at bedtime as needed for sleep, Disp: 30 tablet, Rfl: 5    No Known Allergies    Social History   Past Surgical History:   Procedure Laterality Date    COLONOSCOPY      [place] COLONOSCOPY FLX DX W/COLLJ SPEC WHEN PFRMD N/A 5/9/2016    Procedure: COLONOSCOPY;  Surgeon: Vandana Valenzuela MD;  [facility]; Service: Gastroenterology    [place] ESOPHAGOGASTRODUODENOSCOPY TRANSORAL DIAGNOSTIC N/A 5/9/2016    Procedure: ESOPHAGOGASTRODUODENOSCOPY (EGD); Surgeon: Vandana Valenzuela MD;  [facility]; Service: Gastroenterology    SKIN BIOPSY      Each additional lesion    UPPER GASTROINTESTINAL ENDOSCOPY      WISDOM TOOTH EXTRACTION       Family History   Problem Relation Age of Onset    Hypertension Mother    [place] Leukemia Mother     Lung cancer Father     Hypertension Brother     Leukemia Family     Leukemia Family        Objective:  /72 (BP Location: Left arm, Patient Position: Sitting, Cuff Size: Large)   Pulse 67   Temp (!) 97 2 °F (36 2 °C) (Temporal)   Ht 5' 9 8" (1 773 m)   Wt 97 2 kg (214 lb 3 2 oz)   SpO2 97%   BMI 30 91 kg/m²   Body mass index is 30 91 kg/m²  Physical Exam  Constitutional:       Appearance: He is well-developed  HENT:      Head: Normocephalic  Right Ear: External ear normal       Left Ear: External ear normal       Mouth/Throat:      Pharynx: No posterior oropharyngeal erythema  Eyes:      General: No scleral icterus  Pupils: Pupils are equal, round, and reactive to light  Neck:      Thyroid: No thyromegaly  Trachea: No tracheal deviation  Cardiovascular:      Rate and Rhythm: Normal rate and regular rhythm  Heart sounds: Normal heart sounds  No murmur heard  Pulmonary:      Effort: Pulmonary effort is normal  No respiratory distress  Breath sounds: Normal breath sounds  Chest:      Chest wall: No tenderness  Abdominal:      General: Bowel sounds are normal       Palpations: Abdomen is soft  There is no mass  Tenderness: There is no abdominal tenderness  Musculoskeletal:         General: Normal range of motion  Cervical back: Normal range of motion and neck supple  Right lower leg: No edema  Left lower leg: No edema  Lymphadenopathy:      Cervical: No cervical adenopathy  Skin:     General: Skin is warm  Findings: No lesion or rash  Neurological:      Mental Status: He is alert and oriented to person, place, and time  Cranial Nerves: No cranial nerve deficit  Psychiatric:         Mood and Affect: Mood normal          Behavior: Behavior normal          Thought Content:  Thought content normal

## 2022-01-12 ENCOUNTER — SOCIAL WORK (OUTPATIENT)
Dept: BEHAVIORAL/MENTAL HEALTH CLINIC | Facility: CLINIC | Age: 57
End: 2022-01-12
Payer: COMMERCIAL

## 2022-01-12 DIAGNOSIS — F33.2 SEVERE EPISODE OF RECURRENT MAJOR DEPRESSIVE DISORDER, WITHOUT PSYCHOTIC FEATURES (HCC): Primary | ICD-10-CM

## 2022-01-12 DIAGNOSIS — F41.9 ANXIETY: ICD-10-CM

## 2022-01-12 PROCEDURE — 99404 PREV MED CNSL INDIV APPRX 60: CPT | Performed by: COUNSELOR

## 2022-01-13 PROBLEM — F33.2 SEVERE EPISODE OF RECURRENT MAJOR DEPRESSIVE DISORDER, WITHOUT PSYCHOTIC FEATURES (HCC): Status: ACTIVE | Noted: 2022-01-13

## 2022-01-13 NOTE — PSYCH
Psychotherapy Provided: Individual Psychotherapy 50 minutes     Length of time in session: 50 minutes, follow up in 1 week    Encounter Diagnosis     ICD-10-CM    1  Severe episode of recurrent major depressive disorder, without psychotic features ([facility] [address] )  F33 2    2  Anxiety  F41 9        Goals addressed in session: Goal 1 and Goal 2    Current suicide risk : Low     D: Clinician met with Jennifer Nicolas in person for individual therapy  Jennifer Nicolas processed continued conflict with his daughter and his granddaughter now living with him as his daughter has moved out with a paramour  Clinician explored future planning for care of his granddaughter  Jennifer Nicolas discussed difficulty in managing symptoms of worry and physical symptoms of stress, such as feeling tense and uncontrollable crying  Jennifer Nicolas reports some mild improvement in mood since beginning his leave from work and desire to work on better management of mood and stress  He reports high levels of stress at work including criticism and lack of support from management  A: Jennifer Nicolas was open and engaged in the session and presented with stable mood and affect  P: Continue to meet with Jennifer Nicolas weekly for individual therapy  Behavioral Health Treatment Plan [facility]: Diagnosis and Treatment Plan explained to Nery Mata relates understanding diagnosis and is agreeable to Treatment Plan   Yes

## 2022-01-20 ENCOUNTER — SOCIAL WORK (OUTPATIENT)
Dept: BEHAVIORAL/MENTAL HEALTH CLINIC | Facility: CLINIC | Age: 57
End: 2022-01-20
Payer: COMMERCIAL

## 2022-01-20 ENCOUNTER — TELEPHONE (OUTPATIENT)
Dept: BEHAVIORAL/MENTAL HEALTH CLINIC | Facility: CLINIC | Age: 57
End: 2022-01-20

## 2022-01-20 DIAGNOSIS — F33.2 SEVERE EPISODE OF RECURRENT MAJOR DEPRESSIVE DISORDER, WITHOUT PSYCHOTIC FEATURES (HCC): Primary | ICD-10-CM

## 2022-01-20 DIAGNOSIS — F41.9 ANXIETY: ICD-10-CM

## 2022-01-20 PROCEDURE — 99404 PREV MED CNSL INDIV APPRX 60: CPT | Performed by: COUNSELOR

## 2022-01-20 NOTE — PSYCH
Psychotherapy Provided: Individual Psychotherapy 45 minutes     Length of time in session: 45 minutes, follow up in 1 week    Encounter Diagnosis     ICD-10-CM    1  Severe episode of recurrent major depressive disorder, without psychotic features ([facility] [address] )  F33 2    2  Anxiety  F41 9        Goals addressed in session: Goal 1 and Goal 2     Current suicide risk : Low     D:Clinician met with Cheikh Staples at home for individual therapy  Clinician went over [facility] paperwork and discussed continued weekly therapy to work on depressive symptoms and problem solving  Cheikh Staples reports stressors continuing to cause memory, focus and attention issues even with being out of work  He reports nightmares about work  Clinician explored feelings and normalized along with reflected feelings  Cheikh Staples reports plans to follow up with a  and attempt communication with daughter about his granddaughter  Clinician explored boundaries with family and working towards setting boundaries  A: Cheikh Staples was open and engaged in the session  He reports attempts to decrease stress and work on himself  P: Continue to meet with Cheikh Staples weekly for individual therapy  Behavioral Health Treatment Plan [facility]: Diagnosis and Treatment Plan explained to Josue Cavanaugh relates understanding diagnosis and is agreeable to Treatment Plan   Yes

## 2022-01-20 NOTE — TELEPHONE ENCOUNTER
ROIs for leave of absence form (along with forms) and communication with PCP have been scanned in patient's chart

## 2022-01-22 ENCOUNTER — IMMUNIZATIONS (OUTPATIENT)
Dept: FAMILY MEDICINE CLINIC | Facility: HOSPITAL | Age: 57
End: 2022-01-22

## 2022-01-22 DIAGNOSIS — Z23 ENCOUNTER FOR IMMUNIZATION: Primary | ICD-10-CM

## 2022-01-22 PROCEDURE — 0001A COVID-19 PFIZER VACC 0.3 ML: CPT

## 2022-01-22 PROCEDURE — 91300 COVID-19 PFIZER VACC 0.3 ML: CPT

## 2022-01-24 ENCOUNTER — TELEPHONE (OUTPATIENT)
Dept: PSYCHIATRY | Facility: CLINIC | Age: 57
End: 2022-01-24

## 2022-01-24 DIAGNOSIS — I10 ESSENTIAL HYPERTENSION, BENIGN: ICD-10-CM

## 2022-01-24 RX ORDER — LOSARTAN POTASSIUM 100 MG/1
100 TABLET ORAL DAILY
Qty: 90 TABLET | Refills: 1 | Status: SHIPPED | OUTPATIENT
Start: 2022-01-24 | End: 2022-07-26 | Stop reason: SDUPTHER

## 2022-01-24 NOTE — TELEPHONE ENCOUNTER
Patient called and left voicemail on  line requesting a provider call back from [facility]  Patient did not specify a reason   Stated he also sent an email as well

## 2022-01-26 ENCOUNTER — SOCIAL WORK (OUTPATIENT)
Dept: BEHAVIORAL/MENTAL HEALTH CLINIC | Facility: CLINIC | Age: 57
End: 2022-01-26
Payer: COMMERCIAL

## 2022-01-26 DIAGNOSIS — F41.9 ANXIETY: ICD-10-CM

## 2022-01-26 DIAGNOSIS — F33.2 SEVERE EPISODE OF RECURRENT MAJOR DEPRESSIVE DISORDER, WITHOUT PSYCHOTIC FEATURES (HCC): Primary | ICD-10-CM

## 2022-01-26 PROCEDURE — 99404 PREV MED CNSL INDIV APPRX 60: CPT | Performed by: COUNSELOR

## 2022-01-26 NOTE — PSYCH
Psychotherapy Provided: Individual Psychotherapy 45 minutes     Length of time in session: 45 minutes, follow up in 1 week    Encounter Diagnosis     ICD-10-CM    1  Severe episode of recurrent major depressive disorder, without psychotic features ([facility] [address] )  F33 2    2  Anxiety  F41 9        Goals addressed in session: Goal 1 and Goal 2     Current suicide risk : Low     D: Clinician met with Jeffrey Sabillon in person for individual therapy  He processed concerns for daughter and granddaughters safety being with daughters new paramour and attempts to communicate with daughter about granddaughter living with him  Clinician validated and reflected feelings  Clinician discussed symptoms and burnout and possible self care and coping skills to get back to feeling more physically and mentally healthy  Clinician encouraged exercise eating regular meals, drinking water and sleeping  Clinician also discussed the benefits of setting healthy boundaries with family and possible ways to do this  A: Jeffrey Sabillon was open and engaged in the session  He reports compliance with beginning to work on himself and his health along with setting boundaries  P: Continue to meet with Jeffrey Sabillon weekly for individual therapy  Behavioral Health Treatment Plan [facility]: Diagnosis and Treatment Plan explained to Elieser Marrero relates understanding diagnosis and is agreeable to Treatment Plan   Yes

## 2022-01-28 ENCOUNTER — OFFICE VISIT (OUTPATIENT)
Dept: SLEEP CENTER | Facility: CLINIC | Age: 57
End: 2022-01-28
Payer: COMMERCIAL

## 2022-01-28 VITALS
BODY MASS INDEX: 30.92 KG/M2 | SYSTOLIC BLOOD PRESSURE: 130 MMHG | WEIGHT: 216 LBS | DIASTOLIC BLOOD PRESSURE: 82 MMHG | HEIGHT: 70 IN

## 2022-01-28 DIAGNOSIS — G47.33 OSA (OBSTRUCTIVE SLEEP APNEA): Primary | ICD-10-CM

## 2022-01-28 PROCEDURE — 1036F TOBACCO NON-USER: CPT | Performed by: INTERNAL MEDICINE

## 2022-01-28 PROCEDURE — 99213 OFFICE O/P EST LOW 20 MIN: CPT | Performed by: INTERNAL MEDICINE

## 2022-01-28 PROCEDURE — 3008F BODY MASS INDEX DOCD: CPT | Performed by: INTERNAL MEDICINE

## 2022-01-28 RX ORDER — ZOLPIDEM TARTRATE 6.25 MG/1
6.25 TABLET, FILM COATED, EXTENDED RELEASE ORAL
Qty: 30 TABLET | Refills: 5 | Status: SHIPPED | OUTPATIENT
Start: 2022-01-28 | End: 2022-07-31

## 2022-01-28 NOTE — PROGRESS NOTES
Progress Note - Sleep Center   Erin Beckett YIB:[birth year] MRN: [id number]      Reason for Visit:  64 y o male here for annual follow-up    Assessment:  Doing well on current therapy of APAP 10 to 5 cm for severe HORTENSIA (EVITA = 31 8)  Plan:  Continue same    Follow up: One year    History of Present Illness:  History of HORTENSIA on PAP therapy  Fully compliant and deriving benefit  Review of Systems      Genitourinary none   Cardiology none   Gastrointestinal none   Neurology forgetfulness and poor concentration or confusion,    Constitutional none   Integumentary none   Psychiatry anxiety and depression   Musculoskeletal sciatica   Pulmonary none   ENT none   Endocrine none   Hematological none         I have reviewed and updated the review of systems as necessary      Historical Information    Past Medical History:   Past Medical History:   Diagnosis Date    Colon polyp     COVID-19 11/28/2020    Hypercholesteremia     Hypertension          Past Surgical History:   Past Surgical History:   Procedure Laterality Date    COLONOSCOPY      [place] COLONOSCOPY FLX DX W/COLLJ SPEC WHEN PFRMD N/A 5/9/2016    Procedure: COLONOSCOPY;  Surgeon: Doug Shaver MD;  [facility]; Service: Gastroenterology    MD ESOPHAGOGASTRODUODENOSCOPY TRANSORAL DIAGNOSTIC N/A 5/9/2016    Procedure: ESOPHAGOGASTRODUODENOSCOPY (EGD); Surgeon: Doug Shaver MD;  [facility];   Service: Gastroenterology    SKIN BIOPSY      Each additional lesion    UPPER GASTROINTESTINAL ENDOSCOPY      WISDOM TOOTH EXTRACTION         Social History:   Social History     Socioeconomic History    Marital status: Single     Spouse name: Not on file    Number of children: Not on file    Years of education: Not on file    Highest education level: Not on file   Occupational History    Not on file   Tobacco Use    Smoking status: Never Smoker    Smokeless tobacco: Never Used   Vaping Use    Vaping Use: Never used   Substance and Sexual Activity    Alcohol use: Yes     Alcohol/week: 3 0 standard drinks     Types: 3 Glasses of wine per week     Comment: 3 glasses 3 times per week    Drug use: No    Sexual activity: Not Currently   Other Topics Concern    Not on file   Social History Narrative    Caffeine use     Social Determinants of Health     Financial Resource Strain: Not on file   Food Insecurity: Not on file   Transportation Needs: Not on file   Physical Activity: Not on file   Stress: Not on file   Social Connections: Not on file   Intimate Partner Violence: Not on file   Housing Stability: Not on file       Family History:   Family History   Problem Relation Age of Onset    Hypertension Mother    [place] Leukemia Mother     Lung cancer Father     Hypertension Brother     Leukemia Family     Leukemia Family        Medications/Allergies:      Current Outpatient Medications:     Ascorbic Acid (vitamin C) 1000 MG tablet, Take 1,000 mg by mouth daily, Disp: , Rfl:     atorvastatin (LIPITOR) 40 mg tablet, Take 1 tablet (40 mg total) by mouth daily, Disp: 90 tablet, Rfl: 1    cholecalciferol (VITAMIN D3) 1,000 units tablet, Take 1,000 Units by mouth daily, Disp: , Rfl:     FIBER ADULT GUMMIES PO, Take by mouth 5 grams daily, Disp: , Rfl:     losartan (COZAAR) 100 MG tablet, Take 1 tablet (100 mg total) by mouth daily, Disp: 90 tablet, Rfl: 1    multivitamin (THERAGRAN) TABS, Take 1 tablet by mouth daily, Disp: , Rfl:     Omega-3 Fatty Acids (OMEGA-3 FISH OIL PO), Take 1,080 mL by mouth daily, Disp: , Rfl:           Objective      Vital Signs:   Vitals:    01/28/22 1511   BP: 130/82     [place] Sleepiness Scale: Total score: 4        Physical Exam:    General: Alert, appropriate, cooperative, overweight    Head: NC/AT    Skin: Warm, dry    Neuro: No motor abnormalities, cranial nerves appear intact    Extremity: No clubbing, cyanosis      DME Provider:   [employer]        Counseling / Coordination of Care   I have spent 15 minutes with the patient today in which greater than 50% of this time was spent in counseling/coordination of care regarding: equipment and compliance  Board Certified Sleep Specialist    Portions of the record may have been created with voice recognition software  Occasional wrong word or "sound a like" substitutions may have occurred due to the inherent limitations of voice recognition software  Read the chart carefully and recognize, using context, where substitutions have occurred

## 2022-02-02 ENCOUNTER — TELEPHONE (OUTPATIENT)
Dept: PSYCHIATRY | Facility: CLINIC | Age: 57
End: 2022-02-02

## 2022-02-02 NOTE — TELEPHONE ENCOUNTER
Joanne Willis called asking for office fax number as he had emergent forms for [employer]  I gave him our fax number [phone]

## 2022-02-04 ENCOUNTER — TELEPHONE (OUTPATIENT)
Dept: PSYCHIATRY | Facility: CLINIC | Age: 57
End: 2022-02-04

## 2022-02-04 NOTE — TELEPHONE ENCOUNTER
[employer] forms were received via  [employer]  Placed in [address] [place] [address] to be completed  Called patient and informed him that a LOTUS will need to be completed prior to being able to fax back to [employer]  Patient verablized understanding and stated he will complete at his next visit on 2/10/22

## 2022-02-08 NOTE — TELEPHONE ENCOUNTER
Pt came in person and signed an LOTUS for the [employer] information, spoke with provider and when she is finished with it will be faxed and scanned into the chart

## 2022-02-09 LAB
ALBUMIN SERPL-MCNC: 4.7 G/DL (ref 3.6–5.1)
ALBUMIN/GLOB SERPL: 2 (CALC) (ref 1–2.5)
ALP SERPL-CCNC: 69 U/L (ref 35–144)
ALT SERPL-CCNC: 46 U/L (ref 9–46)
AST SERPL-CCNC: 26 U/L (ref 10–35)
BILIRUB SERPL-MCNC: 0.4 MG/DL (ref 0.2–1.2)
BUN SERPL-MCNC: 15 MG/DL (ref 7–25)
BUN/CREAT SERPL: ABNORMAL (CALC) (ref 6–22)
CALCIUM SERPL-MCNC: 9.3 MG/DL (ref 8.6–10.3)
CHLORIDE SERPL-SCNC: 102 MMOL/L (ref 98–110)
CHOLEST SERPL-MCNC: 175 MG/DL
CHOLEST/HDLC SERPL: 3.6 (CALC)
CO2 SERPL-SCNC: 29 MMOL/L (ref 20–32)
CREAT SERPL-MCNC: 0.91 MG/DL (ref 0.7–1.33)
ERYTHROCYTE [DISTWIDTH] IN BLOOD BY AUTOMATED COUNT: 12.5 % (ref 11–15)
GLOBULIN SER CALC-MCNC: 2.3 G/DL (CALC) (ref 1.9–3.7)
GLUCOSE SERPL-MCNC: 112 MG/DL (ref 65–99)
HCT VFR BLD AUTO: 42 % (ref 38.5–50)
HDLC SERPL-MCNC: 48 MG/DL
HGB BLD-MCNC: 14.6 G/DL (ref 13.2–17.1)
LDLC SERPL CALC-MCNC: 100 MG/DL (CALC)
MCH RBC QN AUTO: 30.4 PG (ref 27–33)
MCHC RBC AUTO-ENTMCNC: 34.8 G/DL (ref 32–36)
MCV RBC AUTO: 87.5 FL (ref 80–100)
NONHDLC SERPL-MCNC: 127 MG/DL (CALC)
PLATELET # BLD AUTO: 286 THOUSAND/UL (ref 140–400)
PMV BLD REES-ECKER: 9.6 FL (ref 7.5–12.5)
POTASSIUM SERPL-SCNC: 4.6 MMOL/L (ref 3.5–5.3)
PROT SERPL-MCNC: 7 G/DL (ref 6.1–8.1)
RBC # BLD AUTO: 4.8 MILLION/UL (ref 4.2–5.8)
SL AMB EGFR AFRICAN AMERICAN: 109 ML/MIN/1.73M2
SL AMB EGFR NON AFRICAN AMERICAN: 94 ML/MIN/1.73M2
SODIUM SERPL-SCNC: 139 MMOL/L (ref 135–146)
TRIGL SERPL-MCNC: 168 MG/DL
WBC # BLD AUTO: 5.3 THOUSAND/UL (ref 3.8–10.8)

## 2022-02-10 ENCOUNTER — SOCIAL WORK (OUTPATIENT)
Dept: BEHAVIORAL/MENTAL HEALTH CLINIC | Facility: CLINIC | Age: 57
End: 2022-02-10
Payer: COMMERCIAL

## 2022-02-10 DIAGNOSIS — F33.2 SEVERE EPISODE OF RECURRENT MAJOR DEPRESSIVE DISORDER, WITHOUT PSYCHOTIC FEATURES (HCC): Primary | ICD-10-CM

## 2022-02-10 DIAGNOSIS — F41.9 ANXIETY: ICD-10-CM

## 2022-02-10 PROCEDURE — 90834 PSYTX W PT 45 MINUTES: CPT | Performed by: COUNSELOR

## 2022-02-10 NOTE — PSYCH
Psychotherapy Provided: Individual Psychotherapy 55 minutes     Length of time in session: 55 minutes, follow up in 1 week    Encounter Diagnosis     ICD-10-CM    1  Severe episode of recurrent major depressive disorder, without psychotic features ([facility] [address] )  F33 2    2  Anxiety  F41 9        Goals addressed in session: Goal 1 and Goal 2     Current suicide risk : Low     D: Clinician met with Everardo Yi in person for individual therapy  Clinician filled out disability paperwork and faxed it to [phone] [address] discussed attempts at setting and maintaining boundaries with ex paramour and details interactions where he expressed his feelings about her behaviors  Clinician praised and encouraged this and benefits of healthy expression of feelings and boundaries  Everardo Yi reports attempting communication with daughter about the care of his granddaughter and continuing to feeling anxiety related to both of their safety while in the presence of daughter new paramour  Clinician explored and discussed managing what he can control  A: Everardo Yi was open and engaged in the session  He was tearful when discussing current stressors and reports working on managing symptoms  P: Continue to meet with Everardo Yi weekly for individual therapy  Behavioral Health Treatment Plan [facility]: Diagnosis and Treatment Plan explained to Lenore Fung relates understanding diagnosis and is agreeable to Treatment Plan   Yes

## 2022-02-14 ENCOUNTER — TELEPHONE (OUTPATIENT)
Dept: PSYCHIATRY | Facility: CLINIC | Age: 57
End: 2022-02-14

## 2022-02-15 ENCOUNTER — OFFICE VISIT (OUTPATIENT)
Dept: INTERNAL MEDICINE CLINIC | Age: 57
End: 2022-02-15
Payer: COMMERCIAL

## 2022-02-15 VITALS
BODY MASS INDEX: 30.54 KG/M2 | TEMPERATURE: 97.9 F | HEART RATE: 66 BPM | DIASTOLIC BLOOD PRESSURE: 88 MMHG | SYSTOLIC BLOOD PRESSURE: 128 MMHG | HEIGHT: 70 IN | OXYGEN SATURATION: 98 % | WEIGHT: 213.3 LBS

## 2022-02-15 DIAGNOSIS — F51.01 PRIMARY INSOMNIA: ICD-10-CM

## 2022-02-15 DIAGNOSIS — I10 HYPERTENSION, BENIGN: ICD-10-CM

## 2022-02-15 DIAGNOSIS — G47.33 OSA (OBSTRUCTIVE SLEEP APNEA): Primary | ICD-10-CM

## 2022-02-15 DIAGNOSIS — F41.9 ANXIETY: ICD-10-CM

## 2022-02-15 DIAGNOSIS — R73.9 HYPERGLYCEMIA: ICD-10-CM

## 2022-02-15 DIAGNOSIS — E78.2 MIXED HYPERLIPIDEMIA: ICD-10-CM

## 2022-02-15 PROCEDURE — 3074F SYST BP LT 130 MM HG: CPT | Performed by: INTERNAL MEDICINE

## 2022-02-15 PROCEDURE — 3008F BODY MASS INDEX DOCD: CPT | Performed by: INTERNAL MEDICINE

## 2022-02-15 PROCEDURE — 99214 OFFICE O/P EST MOD 30 MIN: CPT | Performed by: INTERNAL MEDICINE

## 2022-02-15 PROCEDURE — 3079F DIAST BP 80-89 MM HG: CPT | Performed by: INTERNAL MEDICINE

## 2022-02-15 PROCEDURE — 1036F TOBACCO NON-USER: CPT | Performed by: INTERNAL MEDICINE

## 2022-02-15 RX ORDER — ATORVASTATIN CALCIUM 40 MG/1
40 TABLET, FILM COATED ORAL DAILY
Qty: 90 TABLET | Refills: 1 | Status: SHIPPED | OUTPATIENT
Start: 2022-02-15 | End: 2022-08-05 | Stop reason: SDUPTHER

## 2022-02-15 NOTE — PROGRESS NOTES
Assessment/Plan:    1  Hyperlipidemia  Liver profile is within acceptable range  Advised about diet control  Will continue Lipitor 40 mg daily along with low-fat diet and exercise    2  Hyperglycemia  Fasting blood sugar is elevated  Will check hemoglobin A1c before next visit  Advised a low sugar/low carb diet and exercise    3  Essential hypertension  Repeat blood pressure is 118/76  Will continue with present regimen    4  Anxiety  Presently undergoing counseling  At present does not think that he needed any help with the medicine  5  Obstructive sleep apnea  Continue with CPAP machine  6  Insomnia  Doing well on Ambien CR 6 25 mg  Diagnoses and all orders for this visit:    HORTENSIA (obstructive sleep apnea)    Mixed hyperlipidemia  -     atorvastatin (LIPITOR) 40 mg tablet; Take 1 tablet (40 mg total) by mouth daily    Hypertension, benign    Hyperglycemia  -     Hemoglobin A1C; Future    Anxiety    Primary insomnia               Subjective:          Patient ID: Shamir Parson is a 64 y o  male  The patient is in for a follow-up  Other blood work done last week and would like to discuss results  With no new complaints  The following portions of the patient's history were reviewed and updated as appropriate: allergies, current medications, past family history, past medical history, past social history, past surgical history and problem list     Review of Systems   Constitutional: Negative for fatigue and fever  HENT: Negative for congestion, ear discharge, ear pain, postnasal drip, sinus pressure, sore throat, tinnitus and trouble swallowing  Eyes: Negative for discharge, itching and visual disturbance  Respiratory: Negative for cough and shortness of breath  Cardiovascular: Negative for chest pain and palpitations  Gastrointestinal: Negative for abdominal pain, diarrhea, nausea and vomiting  Endocrine: Negative for cold intolerance and polyuria     Genitourinary: Negative for difficulty urinating, dysuria and urgency  Musculoskeletal: Negative for arthralgias and neck pain  Skin: Negative for rash  Allergic/Immunologic: Negative for environmental allergies  Neurological: Negative for dizziness, weakness and headaches  Psychiatric/Behavioral: Negative for agitation and behavioral problems  The patient is not nervous/anxious  Past Medical History:   Diagnosis Date    Colon polyp     COVID-19 11/28/2020    Hypercholesteremia     Hypertension          Current Outpatient Medications:     Ascorbic Acid (vitamin C) 1000 MG tablet, Take 1,000 mg by mouth daily, Disp: , Rfl:     atorvastatin (LIPITOR) 40 mg tablet, Take 1 tablet (40 mg total) by mouth daily, Disp: 90 tablet, Rfl: 1    cholecalciferol (VITAMIN D3) 1,000 units tablet, Take 1,000 Units by mouth daily, Disp: , Rfl:     FIBER ADULT GUMMIES PO, Take by mouth 5 grams daily, Disp: , Rfl:     losartan (COZAAR) 100 MG tablet, Take 1 tablet (100 mg total) by mouth daily, Disp: 90 tablet, Rfl: 1    multivitamin (THERAGRAN) TABS, Take 1 tablet by mouth daily, Disp: , Rfl:     Omega-3 Fatty Acids (OMEGA-3 FISH OIL PO), Take 1,080 mL by mouth daily, Disp: , Rfl:     zolpidem (Ambien CR) 6 25 MG CR tablet, Take 1 tablet (6 25 mg total) by mouth daily at bedtime as needed for sleep, Disp: 30 tablet, Rfl: 5    No Known Allergies    Social History   Past Surgical History:   Procedure Laterality Date    COLONOSCOPY      [place] COLONOSCOPY FLX DX W/COLLJ SPEC WHEN PFRMD N/A 5/9/2016    Procedure: COLONOSCOPY;  Surgeon: Doug Shaver MD;  [facility]; Service: Gastroenterology    [place] ESOPHAGOGASTRODUODENOSCOPY TRANSORAL DIAGNOSTIC N/A 5/9/2016    Procedure: ESOPHAGOGASTRODUODENOSCOPY (EGD); Surgeon: Doug Shaver MD;  [facility];   Service: Gastroenterology    SKIN BIOPSY      Each additional lesion    UPPER GASTROINTESTINAL ENDOSCOPY      WISDOM TOOTH EXTRACTION       Family History   Problem Relation Age of Onset    Hypertension Mother    Ad[place] Leukemia Mother     Lung cancer Father     Hypertension Brother     Leukemia Family     Leukemia Family        Objective:  /88 (BP Location: Left arm, Patient Position: Sitting, Cuff Size: Standard)   Pulse 66   Temp 97 9 °F (36 6 °C) (Temporal)   Ht 5' 9 8" (1 773 m)   Wt 96 8 kg (213 lb 4 8 oz)   SpO2 98%   BMI 30 78 kg/m²   Body mass index is 30 78 kg/m²  Physical Exam  Constitutional:       Appearance: He is well-developed  HENT:      Head: Normocephalic  Right Ear: External ear normal  There is no impacted cerumen  Left Ear: External ear normal  There is no impacted cerumen  Nose: No rhinorrhea  Eyes:      General: No scleral icterus  Pupils: Pupils are equal, round, and reactive to light  Neck:      Thyroid: No thyromegaly  Trachea: No tracheal deviation  Cardiovascular:      Rate and Rhythm: Normal rate and regular rhythm  Heart sounds: Normal heart sounds  No murmur heard  Pulmonary:      Effort: Pulmonary effort is normal  No respiratory distress  Breath sounds: Normal breath sounds  Chest:      Chest wall: No tenderness  Abdominal:      General: Bowel sounds are normal       Palpations: Abdomen is soft  There is no mass  Tenderness: There is no abdominal tenderness  Musculoskeletal:         General: Normal range of motion  Cervical back: Normal range of motion and neck supple  Right lower leg: No edema  Left lower leg: No edema  Lymphadenopathy:      Cervical: No cervical adenopathy  Skin:     General: Skin is warm  Neurological:      General: No focal deficit present  Mental Status: He is alert and oriented to person, place, and time  Cranial Nerves: No cranial nerve deficit  Psychiatric:         Mood and Affect: Mood normal          Behavior: Behavior normal          Thought Content:  Thought content normal

## 2022-02-17 ENCOUNTER — SOCIAL WORK (OUTPATIENT)
Dept: BEHAVIORAL/MENTAL HEALTH CLINIC | Facility: CLINIC | Age: 57
End: 2022-02-17
Payer: COMMERCIAL

## 2022-02-17 DIAGNOSIS — F33.2 SEVERE EPISODE OF RECURRENT MAJOR DEPRESSIVE DISORDER, WITHOUT PSYCHOTIC FEATURES (HCC): Primary | ICD-10-CM

## 2022-02-17 DIAGNOSIS — F41.9 ANXIETY: ICD-10-CM

## 2022-02-17 PROCEDURE — 90834 PSYTX W PT 45 MINUTES: CPT | Performed by: COUNSELOR

## 2022-02-17 NOTE — PSYCH
Psychotherapy Provided: Individual Psychotherapy 50 minutes     Length of time in session: 50 minutes, follow up in 1 week    Encounter Diagnosis     ICD-10-CM    1  Severe episode of recurrent major depressive disorder, without psychotic features ([facility] [address] )  F33 2    2  Anxiety  F41 9        Goals addressed in session: Goal 1 and Goal 2     Current suicide risk : Low     D: Clinician met with Ximena in person for individual therapy  Brain discussed stress and worry related to getting all the necessary paperwork completed and lack of communication form employment or short term disability  Clinicina encouraged follow through with contacting company to get information  Clinician explored and gave information on boundaries and ways to communicate need for boundaries and information in relationships  Clinician explored stress management and gave feedback on strategies  A; Ximena was open and engaged in the session and presents with stable mood and affect  P: Continue to meet with Ximena weekly for individual therapy  Behavioral Health Treatment Plan [facility]: Diagnosis and Treatment Plan explained to Lauren Gallo relates understanding diagnosis and is agreeable to Treatment Plan   Yes

## 2022-02-24 ENCOUNTER — SOCIAL WORK (OUTPATIENT)
Dept: BEHAVIORAL/MENTAL HEALTH CLINIC | Facility: CLINIC | Age: 57
End: 2022-02-24
Payer: COMMERCIAL

## 2022-02-24 DIAGNOSIS — F33.2 SEVERE EPISODE OF RECURRENT MAJOR DEPRESSIVE DISORDER, WITHOUT PSYCHOTIC FEATURES (HCC): ICD-10-CM

## 2022-02-24 DIAGNOSIS — F41.9 ANXIETY: Primary | ICD-10-CM

## 2022-02-24 PROCEDURE — 90834 PSYTX W PT 45 MINUTES: CPT | Performed by: COUNSELOR

## 2022-02-24 NOTE — PSYCH
Psychotherapy Provided: Individual Psychotherapy 55 minutes     Length of time in session: 55 minutes, follow up in 2 week    Encounter Diagnosis     ICD-10-CM    1  Anxiety  F41 9    2  Severe episode of recurrent major depressive disorder, without psychotic features ([facility] [address] )  F33 2        Goals addressed in session: Goal 1 and Goal 2     Current suicide risk : Low     D: Clinician met with Belinda Brittle in person for individual therapy  Belinda Brittle reports increased frustration and being quick to anger  Clinician explored triggers to anger and working towards greater management of stress  Clinician explored use of coping skills to decrease stress and suggested other possible ways to assist  Brittneyinda Brittle discussed thoughts about going back to work in the future and being unsure about future  Clinician explored past experiences and possibilities for the future  Clinician encouraged Brittneyinda Brittle to reach out to HR and assigned advocate to discuss options  A: Belinda Brittle was open and engaged in the session and reports increase in anger and frustration overall and trouble managing those symptoms  P: Continue to meet with Brittneya Brittle weekly for individual therapy  Behavioral Health Treatment Plan [facility]: Diagnosis and Treatment Plan explained to Cynthia Dickson relates understanding diagnosis and is agreeable to Treatment Plan   Yes

## 2022-02-28 ENCOUNTER — TELEPHONE (OUTPATIENT)
Dept: PSYCHIATRY | Facility: CLINIC | Age: 57
End: 2022-02-28

## 2022-03-10 ENCOUNTER — SOCIAL WORK (OUTPATIENT)
Dept: BEHAVIORAL/MENTAL HEALTH CLINIC | Facility: CLINIC | Age: 57
End: 2022-03-10
Payer: COMMERCIAL

## 2022-03-10 DIAGNOSIS — F33.2 SEVERE EPISODE OF RECURRENT MAJOR DEPRESSIVE DISORDER, WITHOUT PSYCHOTIC FEATURES (HCC): Primary | ICD-10-CM

## 2022-03-10 DIAGNOSIS — F41.9 ANXIETY: ICD-10-CM

## 2022-03-10 PROCEDURE — 90834 PSYTX W PT 45 MINUTES: CPT | Performed by: COUNSELOR

## 2022-03-10 NOTE — PSYCH
Psychotherapy Provided: Individual Psychotherapy 60 minutes     Length of time in session: 60 minutes, follow up in 1 week    Encounter Diagnosis     ICD-10-CM    1  Severe episode of recurrent major depressive disorder, without psychotic features ([facility] [address] )  F33 2    2  Anxiety  F41 9        Goals addressed in session: Goal 1 and Goal 2     Current suicide risk : Low     D: Clinician met with Leatha Wetzel in person for individual therapy  Leatha Wetzel reports continued stressors related to family conflict and working to manage his short term leave  He report having trouble getting a hold and getting answers about his leave and pay with has increased stress level significantly  Leatha Wetzel also reports continued manipulation and stress with daughter and ex with his granddaughter  He reports anger with them attempt to manipulate her to go to his daughters home by buying her a dog  Clinician normalized feelings and discussed ways to manage emotions by setting boundaries and communicating feelings in an assertive way  Leatha Wetzel reports discomfort with current situation with granddaughter as she continues to live with him and only see her mother once a week  He reports fear of his daughter coming to take her granddaughter without notice or willingness to sit down to coordinate care for granddaughter and her best interests  A: Leatha Wetzel was open and engaged in the session  He continues to reports difficulty in managing emotions and report being easily agitated  P: Continue to meet with Leatha Wetzel weekly for individual therapy  Behavioral Health Treatment Plan [facility]: Diagnosis and Treatment Plan explained to Carie Pedroza relates understanding diagnosis and is agreeable to Treatment Plan   Yes
13-May-2018 07:51

## 2022-03-15 ENCOUNTER — OFFICE VISIT (OUTPATIENT)
Dept: URGENT CARE | Age: 57
End: 2022-03-15
Payer: COMMERCIAL

## 2022-03-15 VITALS — TEMPERATURE: 98.3 F | HEART RATE: 89 BPM | RESPIRATION RATE: 18 BRPM | OXYGEN SATURATION: 95 %

## 2022-03-15 DIAGNOSIS — B96.89 ACUTE BACTERIAL BRONCHITIS: Primary | ICD-10-CM

## 2022-03-15 DIAGNOSIS — J20.8 ACUTE BACTERIAL BRONCHITIS: Primary | ICD-10-CM

## 2022-03-15 PROCEDURE — G0382 LEV 3 HOSP TYPE B ED VISIT: HCPCS | Performed by: NURSE PRACTITIONER

## 2022-03-15 RX ORDER — AZITHROMYCIN 250 MG/1
TABLET, FILM COATED ORAL
Qty: 6 TABLET | Refills: 0 | Status: SHIPPED | OUTPATIENT
Start: 2022-03-15 | End: 2022-03-19

## 2022-03-15 RX ORDER — BROMPHENIRAMINE MALEATE, PSEUDOEPHEDRINE HYDROCHLORIDE, AND DEXTROMETHORPHAN HYDROBROMIDE 2; 30; 10 MG/5ML; MG/5ML; MG/5ML
5 SYRUP ORAL 4 TIMES DAILY PRN
Qty: 120 ML | Refills: 0 | Status: SHIPPED | OUTPATIENT
Start: 2022-03-15 | End: 2022-05-24

## 2022-03-15 NOTE — PROGRESS NOTES
[address]        NAME: Naomy Wahl is a 64 y o  male  : 1965    MRN: [id number]  DATE: March 15, 2022  TIME: 2:55 PM    Assessment and Plan   Acute bacterial bronchitis [J20 8, B96 89]  1  Acute bacterial bronchitis  brompheniramine-pseudoephedrine-DM 30-2-10 MG/5ML syrup    azithromycin (ZITHROMAX) 250 mg tablet         Patient Instructions     Debrox OTC PRN and f/u with PCP for ear irrigation   Likely viral URI but cannot r/o bacteria infection, given use of CPAP machine  Advised on cleaning of CPAP equipment   Follow up with PCP in 3-5 days  Proceed to  ER if symptoms worsen  Chief Complaint     Chief Complaint   Patient presents with    Cough     c/o head and chest congestion x 1 week, x 2 negative covid home test         History of Present Illness       HPI   Reports cough, and scratchy throat x 1 week  Also pain behind the eyes  Took some Nyquil and tylenol without relief  No known sick contacts or recent travel  Covid vaccinated  Covid tested x 2 at home and both were negative  He uses a CPAP machine for sleep apnea  Review of Systems   Review of Systems   Constitutional: Negative for chills, fatigue and fever  HENT: Positive for postnasal drip  Negative for congestion and sore throat (scratchy throat)  Respiratory: Positive for cough (with greenish sputum)  Negative for chest tightness, shortness of breath and wheezing  Cardiovascular: Negative for chest pain  Gastrointestinal: Negative for abdominal pain, nausea and vomiting  Neurological: Positive for headaches           Current Medications       Current Outpatient Medications:     Ascorbic Acid (vitamin C) 1000 MG tablet, Take 1,000 mg by mouth daily, Disp: , Rfl:     atorvastatin (LIPITOR) 40 mg tablet, Take 1 tablet (40 mg total) by mouth daily, Disp: 90 tablet, Rfl: 1    azithromycin (ZITHROMAX) 250 mg tablet, Take 2 tablets today then 1 tablet daily x 4 days, Disp: 6 tablet, Rfl: 0   brompheniramine-pseudoephedrine-DM 30-2-10 MG/5ML syrup, Take 5 mL by mouth 4 (four) times a day as needed for allergies, Disp: 120 mL, Rfl: 0    cholecalciferol (VITAMIN D3) 1,000 units tablet, Take 1,000 Units by mouth daily, Disp: , Rfl:     FIBER ADULT GUMMIES PO, Take by mouth 5 grams daily, Disp: , Rfl:     losartan (COZAAR) 100 MG tablet, Take 1 tablet (100 mg total) by mouth daily, Disp: 90 tablet, Rfl: 1    multivitamin (THERAGRAN) TABS, Take 1 tablet by mouth daily, Disp: , Rfl:     Omega-3 Fatty Acids (OMEGA-3 FISH OIL PO), Take 1,080 mL by mouth daily, Disp: , Rfl:     zolpidem (Ambien CR) 6 25 MG CR tablet, Take 1 tablet (6 25 mg total) by mouth daily at bedtime as needed for sleep, Disp: 30 tablet, Rfl: 5    Current Allergies     Allergies as of 03/15/2022    (No Known Allergies)            The following portions of the patient's history were reviewed and updated as appropriate: allergies, current medications, past family history, past medical history, past social history, past surgical history and problem list      Past Medical History:   Diagnosis Date    Colon polyp     COVID-19 11/28/2020    Hypercholesteremia     Hypertension        Past Surgical History:   Procedure Laterality Date    COLONOSCOPY      MT COLONOSCOPY FLX DX W/COLLJ SPEC WHEN PFRMD N/A 5/9/2016    Procedure: COLONOSCOPY;  Surgeon: Giuseppe Peraza MD;  [facility]: [facility]; Service: Gastroenterology    MT ESOPHAGOGASTRODUODENOSCOPY TRANSORAL DIAGNOSTIC N/A 5/9/2016    Procedure: ESOPHAGOGASTRODUODENOSCOPY (EGD); Surgeon: Giuseppe Peraza MD;  Location: [facility];   Service: Gastroenterology    SKIN BIOPSY      Each additional lesion    UPPER GASTROINTESTINAL ENDOSCOPY      WISDOM TOOTH EXTRACTION         Family History   Problem Relation Age of Onset    Hypertension Mother    Kareem Feliciano Leukemia Mother     Lung cancer Father     Hypertension Brother     Leukemia Family     Leukemia Family          Medications have been verified  Objective   Pulse 89   Temp 98 3 °F (36 8 °C)   Resp 18   SpO2 95%   No LMP for male patient  Physical Exam     Physical Exam  HENT:      Right Ear: There is impacted cerumen  Left Ear: Tympanic membrane and ear canal normal       Nose: No rhinorrhea  Mouth/Throat:      Pharynx: Posterior oropharyngeal erythema (mild erythema in the throat) present  No oropharyngeal exudate  Cardiovascular:      Rate and Rhythm: Regular rhythm  Heart sounds: Normal heart sounds  Pulmonary:      Effort: Pulmonary effort is normal       Breath sounds: Normal breath sounds  Lymphadenopathy:      Cervical: No cervical adenopathy

## 2022-03-15 NOTE — PATIENT INSTRUCTIONS
Acute Bronchitis   WHAT YOU NEED TO KNOW:   Acute bronchitis is swelling and irritation in your lungs  It is usually caused by a virus and most often happens in the winter  Bronchitis may also be caused by bacteria or by a chemical irritant, such as smoke  DISCHARGE INSTRUCTIONS:   Return to the emergency department if:   · You cough up blood  · Your lips or fingernails turn blue  · You feel like you are not getting enough air when you breathe  Call your doctor if:   · Your symptoms do not go away or get worse, even after treatment  · Your cough does not get better within 4 weeks  · You have questions or concerns about your condition or care  Medicines: You may  need any of the following:  · Cough suppressants  decrease your urge to cough  · Decongestants  help loosen mucus in your lungs and make it easier to cough up  This can help you breathe easier  · Inhalers  may be given  Your healthcare provider may give you one or more inhalers to help you breathe easier and cough less  An inhaler gives your medicine to open your airways  Ask your healthcare provider to show you how to use your inhaler correctly  · Antibiotics  may be given for up to 5 days if your bronchitis is caused by bacteria  · Acetaminophen  decreases pain and fever  It is available without a doctor's order  Ask how much to take and how often to take it  Follow directions  Read the labels of all other medicines you are using to see if they also contain acetaminophen, or ask your doctor or pharmacist  Acetaminophen can cause liver damage if not taken correctly  Do not use more than 4 grams (4,000 milligrams) total of acetaminophen in one day  · NSAIDs  help decrease swelling and pain or fever  This medicine is available with or without a doctor's order  NSAIDs can cause stomach bleeding or kidney problems in certain people   If you take blood thinner medicine, always ask your healthcare provider if NSAIDs are safe for you  Always read the medicine label and follow directions  · Take your medicine as directed  Contact your healthcare provider if you think your medicine is not helping or if you have side effects  Tell him of her if you are allergic to any medicine  Keep a list of the medicines, vitamins, and herbs you take  Include the amounts, and when and why you take them  Bring the list or the pill bottles to follow-up visits  Carry your medicine list with you in case of an emergency  Self-care:   · Drink liquids as directed  You may need to drink more liquids than usual to stay hydrated  Ask how much liquid to drink each day and which liquids are best for you  · Use a cool mist humidifier  to increase air moisture in your home  This may make it easier for you to breathe and help decrease your cough  · Get more rest   Rest helps your body to heal  Slowly start to do more each day  Rest when you feel it is needed  · Avoid irritants in the air  Avoid chemicals, fumes, and dust  Wear a face mask if you must work around dust or fumes  Stay inside on days when air pollution levels are high  If you have allergies, stay inside when pollen counts are high  Do not use aerosol products, such as spray-on deodorant, bug spray, and hair spray  · Do not smoke or be around others who are smoking  Nicotine and other chemicals in cigarettes and cigars can cause lung damage  Ask your healthcare provider for information if you currently smoke and need help to quit  E-cigarettes or smokeless tobacco still contain nicotine  Talk to your healthcare provider before you use these products  Prevent acute bronchitis:       · Ask about vaccines you may need  Get a flu vaccine each year as soon as recommended, usually in September or October  Ask your healthcare provider if you should also get a pneumonia or COVID-19 vaccine   Your healthcare provider can tell you if you should also get other vaccines, and when to get them     · Prevent the spread of germs  You can decrease your risk for acute bronchitis and other illnesses by doing the following:     ? Wash your hands often with soap and water  Carry germ-killing hand lotion or gel with you  You can use the lotion or gel to clean your hands when soap and water are not available  ? Do not touch your eyes, nose, or mouth unless you have washed your hands first     ? Always cover your mouth when you cough to prevent the spread of germs  It is best to cough into a tissue or your shirt sleeve instead of into your hand  Ask those around you to cover their mouths when they cough  ? Try to avoid people who have a cold or the flu  If you are sick, stay away from others as much as possible  Follow up with your doctor as directed:  Write down questions you have so you will remember to ask them during your follow-up visits  © [employer] 2022 Information is for End User's use only and may not be sold, redistributed or otherwise used for commercial purposes  All illustrations and images included in CareNotes® are the copyrighted property of A D A M , Inc  or Bridger Perry  The above information is an  only  It is not intended as medical advice for individual conditions or treatments  Talk to your doctor, nurse or pharmacist before following any medical regimen to see if it is safe and effective for you

## 2022-03-17 ENCOUNTER — SOCIAL WORK (OUTPATIENT)
Dept: BEHAVIORAL/MENTAL HEALTH CLINIC | Facility: CLINIC | Age: 57
End: 2022-03-17
Payer: COMMERCIAL

## 2022-03-17 DIAGNOSIS — F41.9 ANXIETY: ICD-10-CM

## 2022-03-17 DIAGNOSIS — F33.2 SEVERE EPISODE OF RECURRENT MAJOR DEPRESSIVE DISORDER, WITHOUT PSYCHOTIC FEATURES (HCC): Primary | ICD-10-CM

## 2022-03-17 PROCEDURE — 90834 PSYTX W PT 45 MINUTES: CPT | Performed by: COUNSELOR

## 2022-03-18 NOTE — PSYCH
Psychotherapy Provided: Individual Psychotherapy 45 minutes     Length of time in session: 45 minutes, follow up in 1 week    Encounter Diagnosis     ICD-10-CM    1  Severe episode of recurrent major depressive disorder, without psychotic features ([facility] [address] )  F33 2    2  Anxiety  F41 9        Goals addressed in session: Goal 1 and Goal 2     Current suicide risk : Low     D: Clinician met with Virgilnando Damon in person for individual therapy  Virgilnando Gleasonkaren processed recent interaction with family and stress related to contact with ex paramour and desire to put distance between himself and her for his own mental health  He reports judgement from family due to their daughter and granddaughter and pressure from family to do events and be close  Clinician discussed pros and cons of the the relationship and potential benefits of better boundaries for his mental health  Clinician explored future planning and use of coping skills  He reports difficulty managing emotions and quick to frustration  Clinician explored frustration tolerance  A: Virgilnando Damon was open and engaged in the session  Manjinder Jose was open to suggestions of possible coping skills and boundaries with family  P: Continue to meet with Manjinder Jose weekly for individual therapy  Behavioral Health Treatment Plan [facility]: Diagnosis and Treatment Plan explained to Ondina Duong relates understanding diagnosis and is agreeable to Treatment Plan   Yes

## 2022-03-22 ENCOUNTER — DOCUMENTATION (OUTPATIENT)
Dept: BEHAVIORAL/MENTAL HEALTH CLINIC | Facility: CLINIC | Age: 57
End: 2022-03-22

## 2022-03-24 ENCOUNTER — SOCIAL WORK (OUTPATIENT)
Dept: BEHAVIORAL/MENTAL HEALTH CLINIC | Facility: CLINIC | Age: 57
End: 2022-03-24
Payer: COMMERCIAL

## 2022-03-24 DIAGNOSIS — F41.9 ANXIETY: ICD-10-CM

## 2022-03-24 DIAGNOSIS — F33.2 SEVERE EPISODE OF RECURRENT MAJOR DEPRESSIVE DISORDER, WITHOUT PSYCHOTIC FEATURES (HCC): Primary | ICD-10-CM

## 2022-03-24 PROCEDURE — 90834 PSYTX W PT 45 MINUTES: CPT | Performed by: COUNSELOR

## 2022-03-24 NOTE — PSYCH
Psychotherapy Provided: Individual Psychotherapy 50 minutes     Length of time in session: 50 minutes, follow up in 1 week    Encounter Diagnosis     ICD-10-CM    1  Severe episode of recurrent major depressive disorder, without psychotic features ([facility] [address] )  F33 2    2  Anxiety  F41 9        Goals addressed in session: Goal 1 and Goal 2     Current suicide risk : Low     D: Clinician met with Abiel Cohn in person for individual therapy  Abiel Cohn reports continued frustration with his current work leave and lack of information or follow through  He reports that due to this his anxiety related to his finances have increase due to not being sure when and if he will receive continued pay  Clinician explored efforts made to get additional information and alternative problem solving skills  He reports being unsure about next steps about his current position and career  Clinician explored possibilities for future career and encouraged reaching out to connections for guidance  A: Abiel Cohn was open and engaged in the session  He reports attempts at self care and use of coping skills to better manage symptoms but reports continued issue with focus and irritability  P: Continue to meet with Abiel Cohn weekly for individual therapy  Behavioral Health Treatment Plan [facility]: Diagnosis and Treatment Plan explained to Rito Packer relates understanding diagnosis and is agreeable to Treatment Plan   Yes

## 2022-03-28 ENCOUNTER — TELEPHONE (OUTPATIENT)
Dept: PSYCHIATRY | Facility: CLINIC | Age: 57
End: 2022-03-28

## 2022-03-28 NOTE — TELEPHONE ENCOUNTER
Medical record request was received from Jonathon for time frame of 3/10/22 to present  anne marie attached   Will be placed in [facility] [employer] by the end of the day

## 2022-03-31 ENCOUNTER — SOCIAL WORK (OUTPATIENT)
Dept: BEHAVIORAL/MENTAL HEALTH CLINIC | Facility: CLINIC | Age: 57
End: 2022-03-31
Payer: COMMERCIAL

## 2022-03-31 DIAGNOSIS — F33.2 SEVERE EPISODE OF RECURRENT MAJOR DEPRESSIVE DISORDER, WITHOUT PSYCHOTIC FEATURES (HCC): ICD-10-CM

## 2022-03-31 DIAGNOSIS — F41.9 ANXIETY: Primary | ICD-10-CM

## 2022-03-31 PROCEDURE — 90834 PSYTX W PT 45 MINUTES: CPT | Performed by: COUNSELOR

## 2022-03-31 NOTE — PSYCH
Psychotherapy Provided: Individual Psychotherapy 45 minutes     Length of time in session: 45 minutes, follow up in 2 week    Encounter Diagnosis     ICD-10-CM    1  Anxiety  F41 9    2  Severe episode of recurrent major depressive disorder, without psychotic features ([facility] [address] )  F33 2        Goals addressed in session: Goal 1 and Goal 2     Current suicide risk : Low     D: Clinician met with OROS in person for individual therapy  Clinician and [employer] explored current symptoms and future planning  Angel Nick reports continued concerns with feeling easily overwhelmed and struggle to control emotions  Clinician discussed use of coping skills and processed through emotions in order to work on resolutions  Angel Nick discussed being unsure about future career options as he feels he may not be able to handle high stress work environments again and discussed happiness as being more important to him  Clinician validated insight and discussed things that would make him happy now and in the future and encouraged following up with activities that he has found arian with in the past   A: [employer] reports attempts to engage more in healthy coping skills  He reports anxiety and depression symptoms as severe and that he is having trouble remaining focused during the day  P: Continue to meet with OROS weekly for individual therapy  Behavioral Health Treatment Plan [facility]: Diagnosis and Treatment Plan explained to Jackie Mina relates understanding diagnosis and is agreeable to Treatment Plan   Yes

## 2022-04-07 ENCOUNTER — SOCIAL WORK (OUTPATIENT)
Dept: BEHAVIORAL/MENTAL HEALTH CLINIC | Facility: CLINIC | Age: 57
End: 2022-04-07
Payer: COMMERCIAL

## 2022-04-07 DIAGNOSIS — F41.9 ANXIETY: ICD-10-CM

## 2022-04-07 DIAGNOSIS — F33.2 SEVERE EPISODE OF RECURRENT MAJOR DEPRESSIVE DISORDER, WITHOUT PSYCHOTIC FEATURES (HCC): Primary | ICD-10-CM

## 2022-04-07 PROCEDURE — 90834 PSYTX W PT 45 MINUTES: CPT | Performed by: COUNSELOR

## 2022-04-07 NOTE — PSYCH
Psychotherapy Provided: Individual Psychotherapy 50 minutes     Length of time in session: 50 minutes, follow up in 1 week    Encounter Diagnosis     ICD-10-CM    1  Se[facility] of recurrent [facility] depressive [facility]  F33 2    2  Anxiety  F41 9        Goals addressed in session: Goal 1 and Goal 2     Current suicide risk : Low     D: Clinician met with Evans Wright in person for individual therapy  Evans Wright reports that he was able to speak with a friend and discuss current concerns and future planning  He reports feeling a bit better after getting some social support  Clinician explored benefits of social support and other friends and family that could provide healthy support  Evans Wright denies any other form of social support  Evans Wright discussed continued stress related to disability benefits and not being paid on time and therefor feeling increased financial strain  He reports follow through with contacting his HR department and speaking with his disability benefits provider  He reports stress related to the care for his granddaughter and lack of communication about future planning with her mother  Clinician explored and normalized feelings and problem solved through potential communication  A: Evans Wright presented with stable mood and affect  He reports attempts at coping skills and reaching out for support from others along with plans for the following week to spend one on one time with granddaughter  P: Continue to meet with Evans Wright weekly for individual therapy  Behavioral Health Treatment Plan [facility]: Diagnosis and Treatment Plan explained to Claudio Burns relates understanding diagnosis and is agreeable to Treatment Plan   Yes

## 2022-04-11 ENCOUNTER — TELEPHONE (OUTPATIENT)
Dept: PSYCHIATRY | Facility: CLINIC | Age: 57
End: 2022-04-11

## 2022-04-11 NOTE — TELEPHONE ENCOUNTER
Patient called in requesting to speak to Uanbai , Please contact patient regarding any concern he may have

## 2022-04-13 ENCOUNTER — TELEPHONE (OUTPATIENT)
Dept: PSYCHIATRY | Facility: CLINIC | Age: 57
End: 2022-04-13

## 2022-04-13 NOTE — TELEPHONE ENCOUNTER
Pt came in person to drop off [employer] paperwork and there is a note in there for you to read from pt   It was scaneed in 4/13/22 and the paperwork was put in the mailbox to the provider

## 2022-04-19 ENCOUNTER — TELEPHONE (OUTPATIENT)
Dept: PSYCHIATRY | Facility: CLINIC | Age: 57
End: 2022-04-19

## 2022-04-19 NOTE — TELEPHONE ENCOUNTER
[employer] forms for Disability were received via  [employer]   Will be placed in [facility] [employer] by the end of the day

## 2022-04-21 ENCOUNTER — SOCIAL WORK (OUTPATIENT)
Dept: BEHAVIORAL/MENTAL HEALTH CLINIC | Facility: CLINIC | Age: 57
End: 2022-04-21
Payer: COMMERCIAL

## 2022-04-21 DIAGNOSIS — F41.9 ANXIETY: ICD-10-CM

## 2022-04-21 DIAGNOSIS — F33.2 SEVERE EPISODE OF RECURRENT MAJOR DEPRESSIVE DISORDER, WITHOUT PSYCHOTIC FEATURES (HCC): Primary | ICD-10-CM

## 2022-04-21 PROCEDURE — 90834 PSYTX W PT 45 MINUTES: CPT | Performed by: COUNSELOR

## 2022-04-21 NOTE — PSYCH
Psychotherapy Provided: Individual Psychotherapy 60 minutes     Length of time in session: 60 minutes, follow up in 1 week    Encounter Diagnosis     ICD-10-CM    1  Severe episode of recurrent major depressive disorder, without psychotic features ([facility] [address] )  F33 2    2  Anxiety  F41 9        Goals addressed in session: Goal 1 and Goal 2     Current suicide risk : Low     D: Clinician met with Anthony Platt in person for individual therapy  Anthony Platt reports continued frustration with lack of clarity and information by [employer] and therefore lack of payment  He reports financial stressors have been increasing and that has been impacting his ability to cope with current symptoms  Anthony Platt dicussed recent week with granddaughter and his attempt to utilize coping skills and get out and away from the home and spend time with granddaughter and reports attempts to utilize mindfulness  Clinician explored use and encouraged continued practice  He report anxiety and depressive symptoms continue to be present  Clinician and Anthony Platt went over treatment plan goals and updated plan along with discussed other possible referrals such as medication management and returning to check in with his PCP  Anthony Platt does not want to go on medication at this time and feels therapy has been helpful  A: Anthony Platt was engaged in the session  He became tearful when discussing current stress level and lack of motivation to complete daily tasks  He reports continued high level of anxiety and depression with lack of motivation, tiredness, racing thoughts and sadness throughout the entire day  P: Continue to meet with Anthony Platt weekly for individual therapy  Behavioral Health Treatment Plan [facility]: Diagnosis and Treatment Plan explained to Joyce Kelly relates understanding diagnosis and is agreeable to Treatment Plan   Yes

## 2022-04-21 NOTE — BH TREATMENT PLAN
Addended by: Regina Maldonado on: 6/28/2019 10:50 AM     Modules accepted: Orders Bulmaro Li  1965         Date of Initial Treatment Plan: 12/9/21   Date of Current Treatment Plan: 4/21/22     Treatment Plan Number 2     Strengths/Personal Resources for Self Care: hard worker, good communication, and a supportive family       Diagnosis:   1  Severe episode of recurrent major depressive disorder (HCC)      2  Generalized anxiety disorder            Area of Needs: Process and clarify feelings, decrease stressors and decrease symptoms of anxiety and depression  Increase hobbies and interests to support better mental health and wellbeing          Long Term Goal 1: Reduce frequency , intensity and duration of anxiety so that daily fuctioning is not impaired     Target Date: 9/18/22  Completion Date: 10/18/22         Short Term Objectives for Goal 1: 1  Describe worry and anxiety and how it impacts daily function  2  Educate on cognitive and psychological, behavioral components of anxiety 3  Learn and implement calming strategies to be used at least 3 times per week  4  Increase participation in positive activities to at least once per week  5  Communicate thoughts and feelings to supportive others in Sachin's life      Long Term Goal 2:  Decrease intensity and frequency of emotional outbursts while increasing ability to recognize and appropriately express feelings     Target Date: 9/18/22  Completion Date: 10/18/22         Short Term Objectives for Goal 2: 1: Identify triggers to emotional dysregulation 2  Develop alternative coping skills to address emotional stress  3  Learn and implement problem solving skills to manage difficult emotions  Long Term Goal 3: Develop healthy cognitive patterns and beliefs of self and the world that lead to alleviation and help prevent the relapse of depression       Target Date: 9/18/22  Completion Date: 10/18/22     Short Term Objectives for Goal 3: 1  Identify and verbalize trigger to depressive symptoms     2  Identify what is missing from life that is causing unhappiness  3  Identify and replace cognitive self talk that supports low mood  4  Improve communication skills with significant others in life   Teach and practice assertive communication skills during session to be practiced outside of session          GOAL 1: Modality: Individual therapy 4-6x per month   Completion Date 6/7/22 and The person(s) responsible for carrying out the plan is  [place] [facility]     GOAL 2: Modality: Individual therapy 4-6x per month   Completion Date 6/7/22 and The person(s) responsible for carrying out the plan is  Marsha [address]: Modality: Individual therapy 4-6x per month   Completion Date 6/7/22 and The person(s) responsible for carrying out the plan is  [facility]: Diagnosis and Treatment Plan explained to Bianka Prior relates understanding diagnosis and is agreeable to Treatment Plan

## 2022-04-25 ENCOUNTER — TELEPHONE (OUTPATIENT)
Dept: BEHAVIORAL/MENTAL HEALTH CLINIC | Facility: CLINIC | Age: 57
End: 2022-04-25

## 2022-04-25 NOTE — TELEPHONE ENCOUNTER
Patient called  Jonathon is stating that they have not received the office notes through fax  Also states that Jonathon tried to call you and email you as well   Requests a call back to discuss

## 2022-04-28 ENCOUNTER — SOCIAL WORK (OUTPATIENT)
Dept: BEHAVIORAL/MENTAL HEALTH CLINIC | Facility: CLINIC | Age: 57
End: 2022-04-28
Payer: COMMERCIAL

## 2022-04-28 DIAGNOSIS — F41.9 ANXIETY: ICD-10-CM

## 2022-04-28 DIAGNOSIS — F33.2 SEVERE EPISODE OF RECURRENT MAJOR DEPRESSIVE DISORDER, WITHOUT PSYCHOTIC FEATURES (HCC): Primary | ICD-10-CM

## 2022-04-28 PROCEDURE — 90834 PSYTX W PT 45 MINUTES: CPT | Performed by: COUNSELOR

## 2022-04-28 NOTE — PSYCH
Psychotherapy Provided: Individual Psychotherapy 60 minutes     Length of time in session: 60 minutes, follow up in 1 week    Encounter Diagnosis     ICD-10-CM    1  Severe episode of recurrent major depressive disorder, without psychotic features ([address] )  F33 2    2  Anxiety  F41 9        Goals addressed in session: Goal 1 and Goal 2     Current suicide risk : Low     D: Clinician met with Lesia Mckeon in person for individual therapy  Lesia Mckeon discussed continued symptoms of depression and anxiety and trouble distracting himself and enjoy activities  Clinician went over assessments and dicussed symptoms  Sachin's SILVIO-7 score was 18 and PHQ-9 was 19 and addressedseverity of symptoms with Lesia Mckeon and other possible interventions such as MM or PHP  Lesia Mckeon discussed possible situational stressors attributing to symptoms and attempts to address these concerns  He states that he is having trouble distracting his mind during the day and often perseverates on current social, interpersonal and financial concerns  Clinician explored attempts to regulate emotions an improve mood through coping skills and engaging in the community and socially in a meaningful way  A: Lesia Mckeon denies SI/HI, plan or intent  Clinician explored potential resources for higher level of care such a PHP  Lesia Mckeon does not feel this is necessary at this time and reports plans to work on follow through with behavioral interventions to work on coping with stress and negative emotions  P: Continue to meet with Lesia Mckeon weekly for individual therapy  Behavioral Health Treatment Plan [facility]: Diagnosis and Treatment Plan explained to Nadia Langston relates understanding diagnosis and is agreeable to Treatment Plan  Yes        04/28/22 5034   Over the last 2 weeks, how often have you been bothered by any of the following problems?    Feeling nervous, anxious, or on edge 3   Not being able to stop or control worrying 3   Worrying too much about different things 3   Trouble relaxing 1   Being so restless that it is hard to sit still 2   Becoming easily annoyed or irritable 3   Feeling afraid as if something awful might happen 3   SILVIO-7 Total Score 18

## 2022-05-02 ENCOUNTER — TELEPHONE (OUTPATIENT)
Dept: PSYCHIATRY | Facility: CLINIC | Age: 57
End: 2022-05-02

## 2022-05-02 NOTE — TELEPHONE ENCOUNTER
Pt called and stated that he needs to speak with you he stated that its urgent, but didn't address what it was and is asking for a call back

## 2022-05-02 NOTE — TELEPHONE ENCOUNTER
Writer reached out to [employer] to verify what information was needed due to the patient stating he has not received these forms as of yet  Provider was informed of the communication to [address] [place] [address] office fax number for the request to be  received urgently  ([phone]) will inform the appropriate recipients once forms are received by the

## 2022-05-05 ENCOUNTER — SOCIAL WORK (OUTPATIENT)
Dept: BEHAVIORAL/MENTAL HEALTH CLINIC | Facility: CLINIC | Age: 57
End: 2022-05-05
Payer: COMMERCIAL

## 2022-05-05 ENCOUNTER — TELEPHONE (OUTPATIENT)
Dept: PSYCHIATRY | Facility: CLINIC | Age: 57
End: 2022-05-05

## 2022-05-05 DIAGNOSIS — F41.9 ANXIETY: ICD-10-CM

## 2022-05-05 DIAGNOSIS — F33.2 SEVERE EPISODE OF RECURRENT MAJOR DEPRESSIVE DISORDER, WITHOUT PSYCHOTIC FEATURES (HCC): Primary | ICD-10-CM

## 2022-05-05 PROCEDURE — 90834 PSYTX W PT 45 MINUTES: CPT | Performed by: COUNSELOR

## 2022-05-05 NOTE — TELEPHONE ENCOUNTER
Patient came in to the office to complete an updated LOTUS for [employer]  Scanned LOTUS into the chart  Writer will be emailing LOTUS to "[employer]", which includes specific dates that are needed for the medical records that are being requested

## 2022-05-06 NOTE — PSYCH
Psychotherapy Provided: Individual Psychotherapy 60 minutes     Length of time in session: 60 minutes, follow up in 1 week    Encounter Diagnosis     ICD-10-CM    1  Severe episode of recurrent major depressive disorder, without psychotic features ([facility] [address] )  F33 2    2  Anxiety  F41 9        Goals addressed in session: Goal 1 and Goal 2     Current suicide risk : Low     D: Clinician met with Tamra Nunez in person for individual therapy  Tamra Nunez reports reaching for social support from a friend but overall feeling isolated  He reports that he continues to struggle with financial concerns as he has not been getting paid through [id number] discussed issues with sleep and feeling like his medication is not helping him to fall or stay asleep and that this lack of sleep has been having an impact on his mood during the day  Clinician explored sleep hygiene routines and follow up with sleep medicine doctor to address concerns along with discussed possible strategies to assist with racing thoughts and anxiety her reports he is experiencing at bedtime  A: Tamra Nunez reports attempts at greater self care and coping skills throughout the day and planning more structure activities in order to distract from anxiety and decrease depressive symptoms  He presented with stable mood and affect for the session but continues to endorse lack of motivation, fatigue sadness and high levels of anxiety with racing thoughts  P: Continue to meet with Tamra Nunez weekly for individual therapy  Behavioral Health Treatment Plan [facility]: Diagnosis and Treatment Plan explained to Cullen Zapata relates understanding diagnosis and is agreeable to Treatment Plan   Yes

## 2022-05-12 ENCOUNTER — SOCIAL WORK (OUTPATIENT)
Dept: BEHAVIORAL/MENTAL HEALTH CLINIC | Facility: CLINIC | Age: 57
End: 2022-05-12
Payer: COMMERCIAL

## 2022-05-12 DIAGNOSIS — F33.2 SEVERE EPISODE OF RECURRENT MAJOR DEPRESSIVE DISORDER, WITHOUT PSYCHOTIC FEATURES (HCC): Primary | ICD-10-CM

## 2022-05-12 DIAGNOSIS — F41.9 ANXIETY: ICD-10-CM

## 2022-05-12 PROCEDURE — 90834 PSYTX W PT 45 MINUTES: CPT | Performed by: COUNSELOR

## 2022-05-12 NOTE — PSYCH
Psychotherapy Provided: Individual Psychotherapy 50 minutes     Length of time in session: 50 minutes, follow up in 1 week    Encounter Diagnosis     ICD-10-CM    1  Severe episode of recurrent major depressive disorder, without psychotic features ([facility] [address] )  F33 2    2  Anxiety  F41 9        Goals addressed in session: Goal 1 and Goal 2     Current suicide risk : Low     D: Clinician met with Jer Fiore in person for individual therapy  Jer Fiore processed continued stress with lack of finances and report this is causing sleeplessness, increased worry and racing thought and depressive symptoms  Jer Fiore difficulty with coping skill and distracting himself when he is alone  He reports that he has a more structured schedule when he is with his granddaughter but during the days when she is at school he struggles managing symptoms  Clinician explored possible strategies and facilitated an activity to look at scheduling out his time during the day  Jer Fiore was open to trying to structure his time better during the day and work towards engaging in healthy coping skills such as regular exercise, self soothing activities and keeping up with his daily chores and hygiene  Jjderek Fiore was able to set down a schedule with clinician and reports willingness to attempt to utilize healthy coping skills  A: Jer Fiore continues to endorse hopelessness, fatigue and sadness daily along with anxiety which included racing thoughts  Jjderek Fiore plans to follow up with his PCP for symptoms of vertigo and plans to discuss symptoms of depression and anxiety  P: Continue to meet with Jer Fiore weekly for individual therapy  Behavioral Health Treatment Plan [facility]: Diagnosis and Treatment Plan explained to Kal Dickerson relates understanding diagnosis and is agreeable to Treatment Plan   Yes

## 2022-05-24 ENCOUNTER — OFFICE VISIT (OUTPATIENT)
Dept: INTERNAL MEDICINE CLINIC | Facility: OTHER | Age: 57
End: 2022-05-24
Payer: COMMERCIAL

## 2022-05-24 VITALS
TEMPERATURE: 97.7 F | DIASTOLIC BLOOD PRESSURE: 84 MMHG | RESPIRATION RATE: 18 BRPM | BODY MASS INDEX: 29.92 KG/M2 | WEIGHT: 209 LBS | HEIGHT: 70 IN | HEART RATE: 55 BPM | SYSTOLIC BLOOD PRESSURE: 126 MMHG | OXYGEN SATURATION: 98 %

## 2022-05-24 DIAGNOSIS — H61.21 IMPACTED CERUMEN OF RIGHT EAR: Primary | ICD-10-CM

## 2022-05-24 PROCEDURE — 69210 REMOVE IMPACTED EAR WAX UNI: CPT | Performed by: INTERNAL MEDICINE

## 2022-05-24 PROCEDURE — 3074F SYST BP LT 130 MM HG: CPT | Performed by: INTERNAL MEDICINE

## 2022-05-24 PROCEDURE — 99213 OFFICE O/P EST LOW 20 MIN: CPT | Performed by: INTERNAL MEDICINE

## 2022-05-24 PROCEDURE — 3008F BODY MASS INDEX DOCD: CPT | Performed by: INTERNAL MEDICINE

## 2022-05-24 PROCEDURE — 3079F DIAST BP 80-89 MM HG: CPT | Performed by: INTERNAL MEDICINE

## 2022-05-24 PROCEDURE — 1036F TOBACCO NON-USER: CPT | Performed by: INTERNAL MEDICINE

## 2022-05-24 NOTE — PROGRESS NOTES
Assessment/Plan:    Impacted cerumen of right ear  Cerumen removed  Tolerated procedure well  Continue to monitor clinically  Diagnoses and all orders for this visit:    Impacted cerumen of right ear  -     Ear cerumen removal                Subjective:      Patient ID: Gloria Singh is a 64 y o  male  Chief Complaint   Patient presents with    Dizziness     Getting dizzy, started 3 weeks ago  Starts to spin with some movements but then it stops  Had it in his 19's but had a bad inner ear infection    hm     Hiv, annual exam       64year old male is seen today with concern for vertigo since 3 weeks  He has been experiencing dizziness with change in head position and when first putting on his CPAP  He denies any visual deficits, nausea, vomiting  He denies any recent trauma  Dizziness  This is a new problem  The current episode started 1 to 4 weeks ago  Pertinent negatives include no abdominal pain, chest pain, chills, congestion, coughing, diaphoresis, fatigue, fever, headaches, nausea, numbness, sore throat, vomiting or weakness  The following portions of the patient's history were reviewed and updated as appropriate: allergies, current medications, past family history, past medical history, past social history, past surgical history and problem list     Review of Systems   Constitutional: Negative for activity change, appetite change, chills, diaphoresis, fatigue and fever  HENT: Negative for congestion, postnasal drip, rhinorrhea, sinus pressure, sinus pain, sneezing and sore throat  Eyes: Negative for visual disturbance  Respiratory: Negative for apnea, cough, choking, chest tightness, shortness of breath and wheezing  Cardiovascular: Negative for chest pain, palpitations and leg swelling  Gastrointestinal: Negative for abdominal distention, abdominal pain, anal bleeding, blood in stool, constipation, diarrhea, nausea and vomiting     Endocrine: Negative for cold intolerance and heat intolerance  Genitourinary: Negative for difficulty urinating, dysuria and hematuria  Musculoskeletal: Negative  Skin: Negative  Neurological: Positive for dizziness  Negative for weakness, light-headedness, numbness and headaches  Hematological: Negative for adenopathy  Psychiatric/Behavioral: Negative for agitation, sleep disturbance and suicidal ideas  All other systems reviewed and are negative  Past Medical History:   Diagnosis Date    Colon polyp     COVID-19 11/28/2020    Hypercholesteremia     Hypertension          Current Outpatient Medications:     Ascorbic Acid (vitamin C) 1000 MG tablet, Take 1,000 mg by mouth daily, Disp: , Rfl:     atorvastatin (LIPITOR) 40 mg tablet, Take 1 tablet (40 mg total) by mouth daily, Disp: 90 tablet, Rfl: 1    cholecalciferol (VITAMIN D3) 1,000 units tablet, Take 1,000 Units by mouth daily, Disp: , Rfl:     FIBER ADULT GUMMIES PO, Take by mouth 5 grams daily, Disp: , Rfl:     losartan (COZAAR) 100 MG tablet, Take 1 tablet (100 mg total) by mouth daily, Disp: 90 tablet, Rfl: 1    multivitamin (THERAGRAN) TABS, Take 1 tablet by mouth daily, Disp: , Rfl:     Omega-3 Fatty Acids (OMEGA-3 FISH OIL PO), Take 1,080 mL by mouth daily, Disp: , Rfl:     zolpidem (Ambien CR) 6 25 MG CR tablet, Take 1 tablet (6 25 mg total) by mouth daily at bedtime as needed for sleep, Disp: 30 tablet, Rfl: 5    No Known Allergies    Social History   Past Surgical History:   Procedure Laterality Date    COLONOSCOPY      MA COLONOSCOPY FLX DX W/COLLJ SPEC WHEN PFRMD N/A 5/9/2016    Procedure: COLONOSCOPY;  Surgeon: Brenda Porter MD;  [facility]; Service: Gastroenterology    MA ESOPHAGOGASTRODUODENOSCOPY TRANSORAL DIAGNOSTIC N/A 5/9/2016    Procedure: ESOPHAGOGASTRODUODENOSCOPY (EGD); Surgeon: Brenda Porter MD;  Location: [facility];   Service: Gastroenterology    SKIN BIOPSY      Each additional lesion    UPPER GASTROINTESTINAL ENDOSCOPY      WISDOM TOOTH EXTRACTION       Family History   Problem Relation Age of Onset    Hypertension Mother    Mariluz Credit Leukemia Mother     Lung cancer Father     Hypertension Brother     Leukemia Family     Leukemia Family        Objective:  /84 (BP Location: Left arm, Patient Position: Sitting, Cuff Size: Large)   Pulse 55   Temp 97 7 °F (36 5 °C) (Temporal)   Resp 18   Ht 5' 9 5" (1 765 m)   Wt 94 8 kg (209 lb)   SpO2 98%   BMI 30 42 kg/m²     No results found for this or any previous visit (from the past 1344 hour(s))  Physical Exam  Vitals and nursing note reviewed  Constitutional:       General: He is not in acute distress  Appearance: He is well-developed  He is not diaphoretic  HENT:      Head: Normocephalic and atraumatic  Right Ear: There is impacted cerumen  Eyes:      General: No scleral icterus  Right eye: No discharge  Left eye: No discharge  Conjunctiva/sclera: Conjunctivae normal       Pupils: Pupils are equal, round, and reactive to light  Neck:      Thyroid: No thyromegaly  Vascular: No JVD  Cardiovascular:      Rate and Rhythm: Normal rate and regular rhythm  Heart sounds: Normal heart sounds  No murmur heard  No friction rub  No gallop  Pulmonary:      Effort: Pulmonary effort is normal  No respiratory distress  Breath sounds: Normal breath sounds  No wheezing or rales  Chest:      Chest wall: No tenderness  Abdominal:      General: Bowel sounds are normal  There is no distension  Palpations: Abdomen is soft  There is no mass  Tenderness: There is no abdominal tenderness  There is no guarding or rebound  Musculoskeletal:         General: No tenderness or deformity  Normal range of motion  Cervical back: Normal range of motion and neck supple  Lymphadenopathy:      Cervical: No cervical adenopathy  Skin:     General: Skin is warm and dry  Coloration: Skin is not pale        Findings: No erythema or rash  Neurological:      Mental Status: He is alert and oriented to person, place, and time  Cranial Nerves: No cranial nerve deficit  Coordination: Coordination normal       Deep Tendon Reflexes: Reflexes are normal and symmetric  Psychiatric:         Behavior: Behavior normal          Thought Content: Thought content normal          Judgment: Judgment normal            Ear cerumen removal    Date/Time: 5/24/2022 11:11 AM  Performed by: Vaishali Leung MD  Authorized by: Vaishali Leung MD   Universal Protocol:  Consent: Verbal consent obtained  Consent given by: patient  Patient understanding: patient states understanding of the procedure being performed  Patient identity confirmed: verbally with patient      Patient location:  Clinic  Procedure details:     Location:  R ear    Procedure type: irrigation with instrumentation      Instrumentation: curette and forceps      Approach:  External  Post-procedure details:     Complication:  None    Hearing quality:  Improved    Patient tolerance of procedure:   Tolerated well, no immediate complications

## 2022-05-26 ENCOUNTER — SOCIAL WORK (OUTPATIENT)
Dept: BEHAVIORAL/MENTAL HEALTH CLINIC | Facility: CLINIC | Age: 57
End: 2022-05-26
Payer: COMMERCIAL

## 2022-05-26 DIAGNOSIS — F33.2 SEVERE EPISODE OF RECURRENT MAJOR DEPRESSIVE DISORDER, WITHOUT PSYCHOTIC FEATURES (HCC): Primary | ICD-10-CM

## 2022-05-26 DIAGNOSIS — F41.9 ANXIETY: ICD-10-CM

## 2022-05-26 PROCEDURE — 90834 PSYTX W PT 45 MINUTES: CPT | Performed by: COUNSELOR

## 2022-06-02 ENCOUNTER — SOCIAL WORK (OUTPATIENT)
Dept: BEHAVIORAL/MENTAL HEALTH CLINIC | Facility: CLINIC | Age: 57
End: 2022-06-02
Payer: COMMERCIAL

## 2022-06-02 DIAGNOSIS — F33.2 SEVERE EPISODE OF RECURRENT MAJOR DEPRESSIVE DISORDER, WITHOUT PSYCHOTIC FEATURES (HCC): Primary | ICD-10-CM

## 2022-06-02 DIAGNOSIS — F41.9 ANXIETY: ICD-10-CM

## 2022-06-02 PROCEDURE — 90834 PSYTX W PT 45 MINUTES: CPT | Performed by: COUNSELOR

## 2022-06-02 NOTE — PROGRESS NOTES
06/02/22 5515   Over the last 2 weeks, how often have you been bothered by any of the following problems?    Feeling nervous, anxious, or on edge 3   Not being able to stop or control worrying 3   Worrying too much about different things 3   Trouble relaxing 2   Being so restless that it is hard to sit still 2   Becoming easily annoyed or irritable 2   Feeling afraid as if something awful might happen 3   SILVIO-7 Total Score 18

## 2022-06-06 ENCOUNTER — TELEPHONE (OUTPATIENT)
Dept: PSYCHIATRY | Facility: CLINIC | Age: 57
End: 2022-06-06

## 2022-06-06 NOTE — TELEPHONE ENCOUNTER
Medical records request (Merlin Craze) was received 6/6/22-requesting records from 1/1/22-5/31/22  Placed request in [facility] for review

## 2022-06-07 NOTE — PSYCH
Psychotherapy Provided: Individual Psychotherapy 55 minutes     Length of time in session: 55 minutes, follow up in 1 week    Encounter Diagnosis     ICD-10-CM    1  Severe episode of recurrent major depressive disorder, without psychotic features ([address] )  F33 2    2  Anxiety  F41 9        Goals addressed in session: Goal 1 and Goal 2     Current suicide risk : Low     D: Clinician met with Oz Joy in person for individual therapy  Oz Hamilton discussed follow up with short term disability benefits and frustration with lack of clear communication and answers  Clinician validated frustration and explored current symptoms  Oz Hamilton continues to endorse severe depression and anxiety symptoms  On this day Salty-7 score was 19 and PHQ-9 was 20  Oz Hamilton discussed follow through with attempting more regular schedule during the day and attempts to be more active  Tulsaarin Hamilton reports issued with motivation but states that he does get some temporary relief when engaging in distractive coping skills but states this is only temporary  He reports that when he is not distracted he is thinking about financial concerns and future plans which quickly increases racing thoughts and worry  Clinician explored sleep, eating and hygiene routine  Oz Joy reports that his sleep continues to be distrupted and that he rarely gets quality sleep even with medication  He reports follow through sleep specialist with little improvement  Clinician explored sleep hygiene and discussed psycho education on improvements that could be made with better sleep hygiene  A: Oz Hamilton presented with depressed mood and affect and had trouble remaining focused on one subject during the session  He report attempts at coping skills during the week  Clinician discussed possible benefits of PHP  Tulsaarin Hamilton reports that he will think about it  P: Continue to meet with Oz Hamilton weekly for individual therapy      Behavioral Health Treatment Plan [facility]: Diagnosis and Treatment Plan explained to Oz Hamilton, Facundo Mack relates understanding diagnosis and is agreeable to Treatment Plan   Yes

## 2022-06-09 ENCOUNTER — SOCIAL WORK (OUTPATIENT)
Dept: BEHAVIORAL/MENTAL HEALTH CLINIC | Facility: CLINIC | Age: 57
End: 2022-06-09
Payer: COMMERCIAL

## 2022-06-09 DIAGNOSIS — F41.9 ANXIETY: ICD-10-CM

## 2022-06-09 DIAGNOSIS — F33.2 SEVERE EPISODE OF RECURRENT MAJOR DEPRESSIVE DISORDER, WITHOUT PSYCHOTIC FEATURES (HCC): Primary | ICD-10-CM

## 2022-06-09 PROCEDURE — 90834 PSYTX W PT 45 MINUTES: CPT | Performed by: COUNSELOR

## 2022-06-09 NOTE — PSYCH
Psychotherapy Provided: Individual Psychotherapy 55 minutes     Length of time in session: 55 minutes, follow up in 1 week    Encounter Diagnosis     ICD-10-CM    1  Severe episode of recurrent major depressive disorder, without psychotic features [facility] )  F33 2    2  Anxiety  F41 9        Goals addressed in session: Goal 1 and Goal 2     Current suicide risk : Low     D: Clinician met with Mckenna Marcial in person for indivdiual therapy  Mckenna Marcial discussed efforts made to increase mood through the use of coping skills  He reports temporary relief  Clinician explored use and gave feedback on alternative ways to cope with negative  Clinician discussed CBT concepts and increase awareness of automatic thoughts which lead to emotions and behaviors  He discussed family conflict and working to set and maintain boundaries with family  Clinician validated and encouraged boundaries and healthy communication  A: Mckenna Marcial was open and engaged in the session  He presented with depressed mood and affect  He endorses continued lack of motivation, trouble following through with daily routines and fatigue and tiredness  P: Continue to meet with Mckenna Marcial weekly for individual therapy  Behavioral Health Treatment Plan [facility]: Diagnosis and Treatment Plan explained to Lilliam Cox relates understanding diagnosis and is agreeable to Treatment Plan   Yes

## 2022-06-13 ENCOUNTER — TELEPHONE (OUTPATIENT)
Dept: PSYCHIATRY | Facility: CLINIC | Age: 57
End: 2022-06-13

## 2022-06-16 ENCOUNTER — SOCIAL WORK (OUTPATIENT)
Dept: BEHAVIORAL/MENTAL HEALTH CLINIC | Facility: CLINIC | Age: 57
End: 2022-06-16
Payer: COMMERCIAL

## 2022-06-16 DIAGNOSIS — F41.9 ANXIETY: ICD-10-CM

## 2022-06-16 DIAGNOSIS — F33.2 SEVERE EPISODE OF RECURRENT MAJOR DEPRESSIVE DISORDER, WITHOUT PSYCHOTIC FEATURES (HCC): Primary | ICD-10-CM

## 2022-06-16 PROCEDURE — 90834 PSYTX W PT 45 MINUTES: CPT | Performed by: COUNSELOR

## 2022-06-16 NOTE — PSYCH
Psychotherapy Provided: Individual Psychotherapy 60 minutes     Length of time in session: 60 minutes, follow up in 1 week    Encounter Diagnosis     ICD-10-CM    1  Severe episode of recurrent major depressive disorder, without psychotic features ([address] )  F33 2    2  Anxiety  F41 9        Goals addressed in session: Goal 1 and Goal 2     Current suicide risk : Low     D: Clinician met with Yakov Salomon in person for individual therapy  Yakov Salomon discussed family stressors and feeling like he is more on edge and unable to cope with scott annoyances  Clinician explored this and discussed psycho education on acute and chronic stress  Davonteraulito Salomon was able to identify stressors in both categories and his attempts to decrease and resolve stress in his life  He reports spending time with his granddaughter is relaxing and enjoyable although he still feels unsure about future with her  Clinician explored attempts at communication with his daughter about care for granddaughter and apprehension in pushing the topic in fear she will take her back to their apartment or move to [place]  Clinician explored Sachin's rights as a grandparent and the overall impact of situation on his healing  A: Yakov Salomon was open and engaged in the session  He reports greater follow through with coping skills and physical activities  He reports lack of motivation and trouble maintaining daily routines  Yakov Salomon reports issued with sleep and getting quality sleep with racing thoughts at night which clinician discussed mindfulness and medication as effective intervention  P: Continue to meet with Yakov Salomon weekly for individual therapy  Behavioral Health Treatment Plan [facility]: Diagnosis and Treatment Plan explained to Hien Still relates understanding diagnosis and is agreeable to Treatment Plan   Yes

## 2022-06-23 ENCOUNTER — SOCIAL WORK (OUTPATIENT)
Dept: BEHAVIORAL/MENTAL HEALTH CLINIC | Facility: CLINIC | Age: 57
End: 2022-06-23
Payer: COMMERCIAL

## 2022-06-23 DIAGNOSIS — F33.2 SEVERE EPISODE OF RECURRENT MAJOR DEPRESSIVE DISORDER, WITHOUT PSYCHOTIC FEATURES (HCC): Primary | ICD-10-CM

## 2022-06-23 DIAGNOSIS — F41.9 ANXIETY: ICD-10-CM

## 2022-06-23 PROCEDURE — 90834 PSYTX W PT 45 MINUTES: CPT | Performed by: COUNSELOR

## 2022-06-23 NOTE — PSYCH
Psychotherapy Provided: Individual Psychotherapy 60 minutes     Length of time in session: 60 minutes, follow up in 1 week    Encounter Diagnosis     ICD-10-CM    1  Severe episode of recurrent major depressive disorder, without psychotic features ([address] )  F33 2    2  Anxiety  F41 9        Goals addressed in session: Goal 1 and Goal 2     Current suicide risk : Low     D: Clinician met with Mark Baer in person for individual therapy  Mark Baer reports frustration with navigating with insurance company to get the information they are requesting  Clinician called [employer] and coordinated process during session to ensure records are being released from her office  Clinician explored current symptoms and working on greater awareness of automatic thoughts and how negative thought and cognitive distortion are impacting mood and behaviors  Clinician went over CBT principals and challenged unrealistic worries  Clinician went over worksheet discussed what could happen compared to what will happen with current worries  A: Mark Baer was open and engaged in the session  He continues to endorse depressive symptoms daily and trouble follow through with daily routines  He lacks appropriate social support to assist in dealing with stressors and is hesitant in reaching out for support  Mark Baer reports high anxiety at bedtime which makes it difficult for him to get quality sleep even with sleep medication  P: Continue to meet with Mark Baer weekly for individual therapy  Behavioral Health Treatment Plan [facility]: Diagnosis and Treatment Plan explained to Geoff Mcclendon relates understanding diagnosis and is agreeable to Treatment Plan   Yes

## 2022-06-24 ENCOUNTER — TELEPHONE (OUTPATIENT)
Dept: PSYCHIATRY | Facility: CLINIC | Age: 57
End: 2022-06-24

## 2022-06-24 NOTE — TELEPHONE ENCOUNTER
A medical record request from [address] [place] [address] came via  [employer] for time frame of 9/1/2022-present  Will be placed in [facility] by the end of the day

## 2022-06-30 ENCOUNTER — SOCIAL WORK (OUTPATIENT)
Dept: BEHAVIORAL/MENTAL HEALTH CLINIC | Facility: CLINIC | Age: 57
End: 2022-06-30
Payer: COMMERCIAL

## 2022-06-30 DIAGNOSIS — F41.9 ANXIETY: ICD-10-CM

## 2022-06-30 DIAGNOSIS — F33.2 SEVERE EPISODE OF RECURRENT MAJOR DEPRESSIVE DISORDER, WITHOUT PSYCHOTIC FEATURES (HCC): Primary | ICD-10-CM

## 2022-06-30 PROCEDURE — 90834 PSYTX W PT 45 MINUTES: CPT | Performed by: COUNSELOR

## 2022-06-30 NOTE — PSYCH
Psychotherapy Provided: Individual Psychotherapy 55 minutes     Length of time in session: 55 minutes, follow up in 1week    Encounter Diagnosis     ICD-10-CM    1  Severe episode of recurrent major depressive disorder, without psychotic features ([address] )  F33 2    2  Anxiety  F41 9        Goals addressed in session: Goal 1 and Goal 2     Current suicide risk : Low     D: Clinician met with Kendall Long in person for individual therapy  Clinician called to follow up with [employer] claim for medical records and spoke to Francesco Mcmanus who reports that she will be sending invoice to [employer] claim number [id number] to complete request for records January 1st 2022 to present  Kendall Long reports continued high level of stress related to disability claim, being unsure of future with his job and working on his mental health and wellbeing  Clinician explored recent time away with granddaughter and benefits of getting away for a bit  He did report that he was able to unplug and get away  Clinician validated this and reinforced importance of use of regular coping skills throughout the day and week not just waiting until burnout to take time off  Clinician explored use of CBT strategies to challenge negative thoughts and work on more positive self talk  A: Kendall Long was open and engaged in the session  Kendall Long reports attempts at coping skills and utilizing CBT interventions in between sessions  Kendall Long reports some increase in mood since getting time away and working towards more open communication with his daughter and support  He endorses continued symptoms of depression and anxiety daily  P: Continue to meet with Kendall Long weekly for individual therapy  Behavioral Health Treatment Plan [facility]: Diagnosis and Treatment Plan explained to Stan Number relates understanding diagnosis and is agreeable to Treatment Plan   Yes

## 2022-07-07 ENCOUNTER — SOCIAL WORK (OUTPATIENT)
Dept: BEHAVIORAL/MENTAL HEALTH CLINIC | Facility: CLINIC | Age: 57
End: 2022-07-07
Payer: COMMERCIAL

## 2022-07-07 DIAGNOSIS — F41.9 ANXIETY: ICD-10-CM

## 2022-07-07 DIAGNOSIS — F33.2 SEVERE EPISODE OF RECURRENT MAJOR DEPRESSIVE DISORDER, WITHOUT PSYCHOTIC FEATURES (HCC): Primary | ICD-10-CM

## 2022-07-07 PROCEDURE — 90834 PSYTX W PT 45 MINUTES: CPT | Performed by: COUNSELOR

## 2022-07-07 NOTE — PSYCH
Psychotherapy Provided: Individual Psychotherapy 55 minutes     Length of time in session: 55 minutes, follow up in 1 week    Encounter Diagnosis     ICD-10-CM    1  Severe episode of recurrent major depressive disorder, without psychotic features ([facility] [address] )  F33 2    2  Anxiety  F41 9        Goals addressed in session: Goal 1 and Goal 2     Current suicide risk : Low     D: Clinician met with Cheikh Staples in person for individual therapy  Cheikh Staples discussed future planning and beginning to consider plan for going back to work and what that would entail emotionally  Clinician explored fears and anxiety and skills to manage anxiety  He reports recent vacation has been helpful in getting time away from family and taking time to rest and recharge  Clinician explored automatic thoughts and challenged negative thinking  Clinician explored and encouraged coping skills and discussed possibility of gratitude journal and the benefits  A: Chiekh Staples was open and engaged in the session  He reports mild improvement in mood with increase use of coping skills and reaching out for social support from a friend  He presented with depressed mood and affect and he reports difficulty maintaining daily living activities  P: Continue to meet with Cheikh Staples weekly for individual therapy  Behavioral Health Treatment Plan [facility]: Diagnosis and Treatment Plan explained to Josue Cavanaugh relates understanding diagnosis and is agreeable to Treatment Plan   Yes

## 2022-07-14 ENCOUNTER — SOCIAL WORK (OUTPATIENT)
Dept: BEHAVIORAL/MENTAL HEALTH CLINIC | Facility: CLINIC | Age: 57
End: 2022-07-14
Payer: COMMERCIAL

## 2022-07-14 ENCOUNTER — TELEPHONE (OUTPATIENT)
Dept: PSYCHIATRY | Facility: CLINIC | Age: 57
End: 2022-07-14

## 2022-07-14 DIAGNOSIS — F41.9 ANXIETY: ICD-10-CM

## 2022-07-14 DIAGNOSIS — F33.2 SEVERE EPISODE OF RECURRENT MAJOR DEPRESSIVE DISORDER, WITHOUT PSYCHOTIC FEATURES (HCC): Primary | ICD-10-CM

## 2022-07-14 PROCEDURE — 90834 PSYTX W PT 45 MINUTES: CPT | Performed by: COUNSELOR

## 2022-07-18 NOTE — PSYCH
Psychotherapy Provided: Individual Psychotherapy 55 minutes     Length of time in session: 55 minutes, follow up in 1 week    Encounter Diagnosis     ICD-10-CM    1  Severe episode of recurrent major depressive disorder, without psychotic features ([facility] [address] )  F33 2    2  Anxiety  F41 9        Goals addressed in session: Goal 1 and Goal 2     Current suicide risk : Low     D: Clinician met with Karey Viera in person for individual therapy  He discussed continued stress with working with insurance company to receive his long term disability benefits  Clinician validated follow through and discussed current symptoms  Karey Viera reports that he has been thinking more about what it would look like for him to go back to work and reports feeling overwhelmed at the idea  Clinician explored current fears and anxiety with going back to work and challenged negative thinking and utilized cognitive restructuring  Clinician discussed plans to meet with PCP in the next week or so to discuss his continues symptoms and ask about possible medication that could be helpful  Clinician encouraged this and possible benefits  A: Karey Viera presented with depressed mood and affect and reports attempts at coping skills regularly  He states some minor improvement in mood but reports easily being affected by family drama  P: Continue to meet with Karey Viera weekly for individual therapy  Behavioral Health Treatment Plan [facility]: Diagnosis and Treatment Plan explained to Albino Honeycutt relates understanding diagnosis and is agreeable to Treatment Plan   Yes

## 2022-07-19 LAB — HBA1C MFR BLD: 5.4 % OF TOTAL HGB

## 2022-07-21 ENCOUNTER — SOCIAL WORK (OUTPATIENT)
Dept: BEHAVIORAL/MENTAL HEALTH CLINIC | Facility: CLINIC | Age: 57
End: 2022-07-21
Payer: COMMERCIAL

## 2022-07-21 DIAGNOSIS — F41.9 ANXIETY: ICD-10-CM

## 2022-07-21 DIAGNOSIS — F33.2 SEVERE EPISODE OF RECURRENT MAJOR DEPRESSIVE DISORDER, WITHOUT PSYCHOTIC FEATURES (HCC): Primary | ICD-10-CM

## 2022-07-21 PROCEDURE — 90834 PSYTX W PT 45 MINUTES: CPT | Performed by: COUNSELOR

## 2022-07-21 NOTE — PSYCH
Psychotherapy Provided: Individual Psychotherapy 60 minutes     Length of time in session: 60 minutes, follow up in 1 week    Encounter Diagnosis     ICD-10-CM    1  Severe episode of recurrent major depressive disorder, without psychotic features ([facility] )  F33 2    2  Anxiety  F41 9        Goals addressed in session: Goal 1 and Goal 2     Current suicide risk : Low     D: Clinician met with Dudley Escalante in person for individual therapy  Dudley Boris processed plans for duration of the summer and getting prepared to go to back to work  He processed fears related to this and concerns about continued lack of focus and frustration tolerance  Clinician followed up on strategies they had discussed and working on greater use of self care and boundaries with his family  He reports interaction with ex paramour as being trigger to stress and that he continues to be unsure about future with granddaughter or her daughters plans for the upcoming school year  Clinician discussed ways to communicate needs and gain information in order to decrease anxiety and racing thoughts  A: Dudley Escalante presented with depressed mood and reports high anxiety especially at night time or when he is alone  Clinician explored use of coping skills and discussed alternatives to be used in times  P; Continue to meet with Dudley Escalante weekly for individual therapy  Behavioral Health Treatment Plan [facility]: Diagnosis and Treatment Plan explained to Mya Kearney relates understanding diagnosis and is agreeable to Treatment Plan   Yes

## 2022-07-26 DIAGNOSIS — I10 ESSENTIAL HYPERTENSION, BENIGN: ICD-10-CM

## 2022-07-26 RX ORDER — LOSARTAN POTASSIUM 100 MG/1
100 TABLET ORAL DAILY
Qty: 90 TABLET | Refills: 0 | Status: SHIPPED | OUTPATIENT
Start: 2022-07-26 | End: 2022-08-05 | Stop reason: SDUPTHER

## 2022-07-26 NOTE — TELEPHONE ENCOUNTER
5/24/2022      [facility] for patient to call back and make follow up visit with Dr Severa Dryer

## 2022-07-28 ENCOUNTER — SOCIAL WORK (OUTPATIENT)
Dept: BEHAVIORAL/MENTAL HEALTH CLINIC | Facility: CLINIC | Age: 57
End: 2022-07-28
Payer: COMMERCIAL

## 2022-07-28 DIAGNOSIS — F33.2 SEVERE EPISODE OF RECURRENT MAJOR DEPRESSIVE DISORDER, WITHOUT PSYCHOTIC FEATURES (HCC): Primary | ICD-10-CM

## 2022-07-28 DIAGNOSIS — G47.33 OSA (OBSTRUCTIVE SLEEP APNEA): ICD-10-CM

## 2022-07-28 DIAGNOSIS — F41.9 ANXIETY: ICD-10-CM

## 2022-07-28 PROCEDURE — 90834 PSYTX W PT 45 MINUTES: CPT | Performed by: COUNSELOR

## 2022-07-28 NOTE — PSYCH
Psychotherapy Provided: Individual Psychotherapy 45 minutes     Length of time in session: 45 minutes, follow up in 1week    Encounter Diagnosis     ICD-10-CM    1  Severe episode of recurrent major depressive disorder, without psychotic features ([facility] [address] [facility] )  F33 2    2  Anxiety  F41 9        Goals addressed in session: Goal 1 and Goal 2     Current suicide risk : Low     D: Clinician met with Davis Maya in person for individual therapy  Davis Maya requested that they call [employer] to follow up on records request reporting that he had not been approved for long term disability and was not getting answers from his insurance company about delay or verification that they records were sent  Clinician spoke to [employer] from [facility] who stated that a invoice was sent on June 11th but was yet to be paid to [employer] and that on July 26th records were directly released to [address] [place] [address] discussed plans to return to work on September but feeling anxiety when thinking about workload  Clinician explored triggers and symptoms and learned skills  He states that he is unsure if he will be able to handle returning back to work and endorses continued depressive symptoms  Clinician explored benefits of returning to work and regular consistent schedule on mental wellbeing  A: Davis Maya was engaged in the session and was able to processed continued attempts at coping skills to alleviate depressive symptoms and get more consistent in his schedule  P: Continue to meet with Davis Maya weekly for individual therapy  Behavioral Health Treatment Plan [facility]: Diagnosis and Treatment Plan explained to Suha Kelley relates understanding diagnosis and is agreeable to Treatment Plan   Yes

## 2022-07-31 RX ORDER — ZOLPIDEM TARTRATE 6.25 MG/1
6.25 TABLET, FILM COATED, EXTENDED RELEASE ORAL
Qty: 30 TABLET | Refills: 5 | Status: SHIPPED | OUTPATIENT
Start: 2022-07-31

## 2022-08-04 ENCOUNTER — SOCIAL WORK (OUTPATIENT)
Dept: BEHAVIORAL/MENTAL HEALTH CLINIC | Facility: CLINIC | Age: 57
End: 2022-08-04
Payer: COMMERCIAL

## 2022-08-04 DIAGNOSIS — F41.9 ANXIETY: ICD-10-CM

## 2022-08-04 DIAGNOSIS — F33.2 SEVERE EPISODE OF RECURRENT MAJOR DEPRESSIVE DISORDER, WITHOUT PSYCHOTIC FEATURES (HCC): Primary | ICD-10-CM

## 2022-08-04 PROCEDURE — 90834 PSYTX W PT 45 MINUTES: CPT | Performed by: COUNSELOR

## 2022-08-04 NOTE — PSYCH
Psychotherapy Provided: Individual Psychotherapy 55 minutes     Length of time in session: 55 minutes, follow up in 1 week    Encounter Diagnosis     ICD-10-CM    1  Severe episode of recurrent major depressive disorder, without psychotic features ([address] )  F33 2    2  Anxiety  F41 9        Goals addressed in session: Goal 1 and Goal 2     Current suicide risk : Low     D: Clinician met with Belinda Brittle in person for individual therapy  Belinda Brittle processed continued symptoms of anxiety and depression and reports little relief with behavioral techniques  He states that he plans to speak with his PCP about the possibility of medication to help him better manage his symptoms  Clinicianexplored decision to reach out to PCP and the possible benefits of medication in increasing mood and motivation  Clinician facilitated PHQ-9 17 and SILVIO-7 20 which indicated sever anxiety and depression  Belinda Brittle discussed working to set better boundaries with family, unitize coping skills and increase daily activities as being helpful and giving some temporary relief from symptoms  He reports high anxiety about going back to work and continued stress related to long term disability claim and working with his   A: Belinda Brittle was open and engaged in the session  Belinda Brittle continues to endorse symptoms of anxiety and depression on a daily basis  He denies SI/HI, plan or intent  He was tearful when discussing current symptoms and struggle to maintain mental well being with current stress level  P: Continue to meet with Belinda Brittle weekly for individual therapy  Behavioral Health Treatment Plan [facility]: Diagnosis and Treatment Plan explained to Cynthia Dickson relates understanding diagnosis and is agreeable to Treatment Plan   Yes

## 2022-08-05 ENCOUNTER — OFFICE VISIT (OUTPATIENT)
Dept: INTERNAL MEDICINE CLINIC | Age: 57
End: 2022-08-05
Payer: COMMERCIAL

## 2022-08-05 VITALS
BODY MASS INDEX: 29.63 KG/M2 | DIASTOLIC BLOOD PRESSURE: 82 MMHG | HEIGHT: 70 IN | HEART RATE: 83 BPM | WEIGHT: 207 LBS | OXYGEN SATURATION: 97 % | TEMPERATURE: 97.7 F | SYSTOLIC BLOOD PRESSURE: 124 MMHG

## 2022-08-05 DIAGNOSIS — F41.9 ANXIETY: ICD-10-CM

## 2022-08-05 DIAGNOSIS — F33.2 SEVERE EPISODE OF RECURRENT MAJOR DEPRESSIVE DISORDER, WITHOUT PSYCHOTIC FEATURES (HCC): ICD-10-CM

## 2022-08-05 DIAGNOSIS — I10 HYPERTENSION, BENIGN: ICD-10-CM

## 2022-08-05 DIAGNOSIS — I10 ESSENTIAL HYPERTENSION, BENIGN: ICD-10-CM

## 2022-08-05 DIAGNOSIS — E78.2 MIXED HYPERLIPIDEMIA: ICD-10-CM

## 2022-08-05 DIAGNOSIS — G47.33 OSA (OBSTRUCTIVE SLEEP APNEA): Primary | ICD-10-CM

## 2022-08-05 PROCEDURE — 99214 OFFICE O/P EST MOD 30 MIN: CPT | Performed by: INTERNAL MEDICINE

## 2022-08-05 PROCEDURE — 3079F DIAST BP 80-89 MM HG: CPT | Performed by: INTERNAL MEDICINE

## 2022-08-05 PROCEDURE — 3074F SYST BP LT 130 MM HG: CPT | Performed by: INTERNAL MEDICINE

## 2022-08-05 RX ORDER — LOSARTAN POTASSIUM 100 MG/1
100 TABLET ORAL DAILY
Qty: 90 TABLET | Refills: 1 | Status: SHIPPED | OUTPATIENT
Start: 2022-08-05

## 2022-08-05 RX ORDER — ATORVASTATIN CALCIUM 40 MG/1
40 TABLET, FILM COATED ORAL DAILY
Qty: 90 TABLET | Refills: 1 | Status: SHIPPED | OUTPATIENT
Start: 2022-08-05

## 2022-08-05 NOTE — PROGRESS NOTES
Assessment/Plan:    1  Anxiety/depression  Will start patient on sertraline 25 mg daily for 5 days then 50 mg daily  Advised to continue with psychotherapy by psychotherapist   Presently he is doing 1 session every week  Also advised for healthy diet and exercise  Will check thyroid function test    2  Hyperlipidemia  Continue with Lipitor 40 mg daily  Will check lipid profile before next visit  3  Essential hypertension  Blood pressure is stable on present regimen of losartan 100 mg daily along with low-fat salt diet and exercise  4  Obstructive sleep apnea  Stable on CPAP machine and being managed by sleep specialist     Diagnoses and all orders for this visit:    HORTENSIA (obstructive sleep apnea)    Mixed hyperlipidemia  -     atorvastatin (LIPITOR) 40 mg tablet; Take 1 tablet (40 mg total) by mouth daily  -     Lipid Panel with Direct LDL reflex; Future  -     Comprehensive metabolic panel; Future  -     CBC; Future    Essential hypertension, benign  -     losartan (COZAAR) 100 MG tablet; Take 1 tablet (100 mg total) by mouth daily  -     Basic metabolic panel; Future    Hypertension, benign    Anxiety  -     sertraline (Zoloft) 50 mg tablet; Take 1 tablet (50 mg total) by mouth daily  -     TSH, 3rd generation with Free T4 reflex; Future    Severe episode of recurrent major depressive disorder, without psychotic features ([facility] [address] )  -     sertraline (Zoloft) 50 mg tablet; Take 1 tablet (50 mg total) by mouth daily    Other orders  -     Fish Oil-Cholecalciferol (OMEGA-3 + VITAMIN D3 PO); Take 2 capsules by mouth Each capsule = 1000 units of vitamin d3               Subjective:          Patient ID: Sara Nichole is a 62 y o  male  Patient is here for follow-up  Also complaining of worsening anxiety and depression  Presently she is being managed by psychotherapist and her recommendation is that patient should be started on SSRI  Patient is willing to be started on dose medicine    Also complaining of poor energy level in the daytime lack of concentration due to anxiety and depression  The following portions of the patient's history were reviewed and updated as appropriate: allergies, current medications, past family history, past medical history, past social history, past surgical history and problem list     Review of Systems   Constitutional: Negative for fatigue and fever  HENT: Negative for congestion, ear discharge, ear pain, postnasal drip, sinus pressure, sore throat, tinnitus and trouble swallowing  Eyes: Negative for discharge, itching and visual disturbance  Respiratory: Negative for cough and shortness of breath  Cardiovascular: Negative for chest pain and palpitations  Gastrointestinal: Negative for abdominal pain, diarrhea, nausea and vomiting  Endocrine: Negative for cold intolerance and polyuria  Genitourinary: Negative for difficulty urinating, dysuria and urgency  Musculoskeletal: Negative for arthralgias and neck pain  Skin: Negative for rash  Allergic/Immunologic: Negative for environmental allergies  Neurological: Negative for dizziness, weakness and headaches  Psychiatric/Behavioral: Positive for decreased concentration  Negative for agitation and behavioral problems  The patient is nervous/anxious            Past Medical History:   Diagnosis Date    Colon polyp     COVID-19 11/28/2020    Hypercholesteremia     Hypertension          Current Outpatient Medications:     Ascorbic Acid (vitamin C) 1000 MG tablet, Take 1,000 mg by mouth daily, Disp: , Rfl:     atorvastatin (LIPITOR) 40 mg tablet, Take 1 tablet (40 mg total) by mouth daily, Disp: 90 tablet, Rfl: 1    cholecalciferol (VITAMIN D3) 1,000 units tablet, Take 1,000 Units by mouth daily, Disp: , Rfl:     FIBER ADULT GUMMIES PO, Take by mouth 5 grams daily, Disp: , Rfl:     Fish Oil-Cholecalciferol (OMEGA-3 + VITAMIN D3 PO), Take 2 capsules by mouth Each capsule = 1000 units of vitamin d3, Disp: , Rfl:   losartan (COZAAR) 100 MG tablet, Take 1 tablet (100 mg total) by mouth daily, Disp: 90 tablet, Rfl: 1    multivitamin (THERAGRAN) TABS, Take 1 tablet by mouth daily, Disp: , Rfl:     sertraline (Zoloft) 50 mg tablet, Take 1 tablet (50 mg total) by mouth daily, Disp: 90 tablet, Rfl: 0    zolpidem (AMBIEN CR) 6 25 MG CR tablet, TAKE 1 TABLET (6 25 MG TOTAL) BY MOUTH DAILY AT BEDTIME AS NEEDED FOR SLEEP, Disp: 30 tablet, Rfl: 5    Omega-3 Fatty Acids (OMEGA-3 FISH OIL PO), Take 1,080 mL by mouth daily, Disp: , Rfl:     No Known Allergies    Social History   Past Surgical History:   Procedure Laterality Date    COLONOSCOPY      ID COLONOSCOPY FLX DX W/COLLJ SPEC WHEN PFRMD N/A 5/9/2016    Procedure: COLONOSCOPY;  Surgeon: Ned Bundy MD;  [facility]: [facility]; Service: Gastroenterology    ID ESOPHAGOGASTRODUODENOSCOPY TRANSORAL DIAGNOSTIC N/A 5/9/2016    Procedure: ESOPHAGOGASTRODUODENOSCOPY (EGD); Surgeon: Ned Bundy MD;  Location: [facility]; Service: Gastroenterology    SKIN BIOPSY      Each additional lesion    UPPER GASTROINTESTINAL ENDOSCOPY      WISDOM TOOTH EXTRACTION       Family History   Problem Relation Age of Onset    Hypertension Mother    Wash Caller Leukemia Mother     Lung cancer Father     Hypertension Brother     Leukemia Family     Leukemia Family        Objective:  /82 (BP Location: Left arm, Patient Position: Sitting, Cuff Size: Large)   Pulse 83   Temp 97 7 °F (36 5 °C) (Temporal)   Ht 5' 9 5" (1 765 m)   Wt 93 9 kg (207 lb)   SpO2 97% Comment: room air  BMI 30 13 kg/m²   Body mass index is 30 13 kg/m²  Physical Exam  Constitutional:       Appearance: He is well-developed  HENT:      Head: Normocephalic  Right Ear: External ear normal       Left Ear: External ear normal       Nose: No rhinorrhea  Mouth/Throat:      Pharynx: No posterior oropharyngeal erythema  Eyes:      General: No scleral icterus       Pupils: Pupils are equal, round, and reactive to light    Neck:      Thyroid: No thyromegaly  Trachea: No tracheal deviation  Cardiovascular:      Rate and Rhythm: Normal rate and regular rhythm  Heart sounds: Normal heart sounds  No murmur heard  Pulmonary:      Effort: Pulmonary effort is normal  No respiratory distress  Breath sounds: Normal breath sounds  Chest:      Chest wall: No tenderness  Abdominal:      General: Bowel sounds are normal       Palpations: Abdomen is soft  There is no mass  Tenderness: There is no abdominal tenderness  Musculoskeletal:         General: Normal range of motion  Cervical back: Normal range of motion and neck supple  Right lower leg: No edema  Left lower leg: No edema  Lymphadenopathy:      Cervical: No cervical adenopathy  Skin:     General: Skin is warm  Neurological:      Mental Status: He is alert and oriented to person, place, and time  Cranial Nerves: No cranial nerve deficit  Psychiatric:         Behavior: Behavior normal          Thought Content: Thought content normal       Comments: Appears more anxious

## 2022-08-10 ENCOUNTER — SOCIAL WORK (OUTPATIENT)
Dept: BEHAVIORAL/MENTAL HEALTH CLINIC | Facility: CLINIC | Age: 57
End: 2022-08-10
Payer: COMMERCIAL

## 2022-08-10 ENCOUNTER — DOCUMENTATION (OUTPATIENT)
Dept: BEHAVIORAL/MENTAL HEALTH CLINIC | Facility: CLINIC | Age: 57
End: 2022-08-10

## 2022-08-10 DIAGNOSIS — F41.9 ANXIETY: ICD-10-CM

## 2022-08-10 DIAGNOSIS — F33.2 SEVERE EPISODE OF RECURRENT MAJOR DEPRESSIVE DISORDER, WITHOUT PSYCHOTIC FEATURES (HCC): Primary | ICD-10-CM

## 2022-08-10 PROCEDURE — 90834 PSYTX W PT 45 MINUTES: CPT | Performed by: COUNSELOR

## 2022-08-10 NOTE — PSYCH
Psychotherapy Provided: Individual Psychotherapy 50 minutes     Length of time in session: 50 minutes, follow up in 2 week    Encounter Diagnosis     ICD-10-CM    1  Severe episode of recurrent major depressive disorder, without psychotic features ([facility] [address] )  F33 2    2  Anxiety  F41 9        Goals addressed in session: Goal 1 and Goal 2     Current suicide risk : Low     D: Clinician met with Cheikh Staples in person for individual therapy  Cheikh Staples processed recent PCP appointment and decision to get on Zoloft  He reports feeling good about medication trial and hopes that it will help to improve mood  Clinician discussed importance of taking the medication as prescribed and checking in with his doctor for follow ups  He discussed continued symptoms and struggle with increase depressive symptoms with his granddaughter being away for the past week  He states difficulty staying busy and distracting his mind for long period of times due to issues with focus  He states follow through with disability claim and upcoming plans to return to work  He states he is feeling increased worry and anxiety related to the unknown nature of what his job will be like when he returns  Clinician normalized anxiety and processed relaxation skills to help manage symptoms  A: Cheikh Staples presented with depressed mood and affect  He report being hopeful about new medication  He states that he continues to struggle with sleeping and getting restful sleep with medication from his sleep specialist    P: Continue to meet with Cheikh Staples in two weeks for weekly individual therapy  Behavioral Health Treatment Plan [facility]: Diagnosis and Treatment Plan explained to Josue Cavanaugh relates understanding diagnosis and is agreeable to Treatment Plan   Yes

## 2022-08-12 ENCOUNTER — TELEPHONE (OUTPATIENT)
Dept: INTERNAL MEDICINE CLINIC | Age: 57
End: 2022-08-12

## 2022-08-12 NOTE — TELEPHONE ENCOUNTER
LMOM that we rec'd paperwork for FMLA, doesn't look like we have ever filled them out before so I let pt know that it would be a 30$ initial charge and any forms after this would be 15$     I am scanning a copy of the papers into media and giving them to [facility]

## 2022-08-17 NOTE — TELEPHONE ENCOUNTER
8/17/22, vini texted Dr Mahi Dorsey if he is willing  to fill out disability  [employer] form for pt- per Dr Slava Humphreys- no- they should be filled out by pyschiatrist - he is dealing with them for this- called pt - pt states all his Doctors are rec the forms- called [employer] - the pyschiatrist lottie aguilar is filling out the FMLA paperwork and disability form for [employer]  The only thing they need ([employer]) is our last office note- stating that we prescribed pt sertraline 25 mg for 5 days - then 50 mg daily, it has Dr Lydia De La Cruz advise to continue with joy and per our office pt has no physical restrictive duties from Dr Slava Humphreys - [phone] claim # [id number]- spoke to pt explained all the above and vini texted Dr Slava Humphreys again    ds

## 2022-08-19 ENCOUNTER — TELEPHONE (OUTPATIENT)
Dept: PSYCHIATRY | Facility: CLINIC | Age: 57
End: 2022-08-19

## 2022-08-19 NOTE — TELEPHONE ENCOUNTER
Pt called and wanted to receive confirmation that provider obtained an email from [address] [place] [address] regarding the pt's long term disability  Pt would like to be emailed by provider so that they know the provider received the email, what the email was regarding, and if there are any further questions/concerns  Pt also wanted to make provider aware that the pt's PCP has prescribed 50mg sertraline (ZOLOFT)

## 2022-08-24 NOTE — TELEPHONE ENCOUNTER
Email sent to physician billing requesting form fee charge be removed from system and patient reimbursed form fee since we did not complete forms

## 2022-08-25 ENCOUNTER — SOCIAL WORK (OUTPATIENT)
Dept: BEHAVIORAL/MENTAL HEALTH CLINIC | Facility: CLINIC | Age: 57
End: 2022-08-25
Payer: COMMERCIAL

## 2022-08-25 DIAGNOSIS — F41.9 ANXIETY: ICD-10-CM

## 2022-08-25 DIAGNOSIS — F33.2 SEVERE EPISODE OF RECURRENT MAJOR DEPRESSIVE DISORDER, WITHOUT PSYCHOTIC FEATURES (HCC): Primary | ICD-10-CM

## 2022-08-25 PROCEDURE — 90834 PSYTX W PT 45 MINUTES: CPT | Performed by: COUNSELOR

## 2022-08-31 NOTE — PSYCH
Psychotherapy Provided: Individual Psychotherapy 55 minutes     Length of time in session: 55 minutes, follow up in 1 week    Encounter Diagnosis     ICD-10-CM    1  Severe episode of recurrent major depressive disorder, without psychotic features ([facility] [address] )  F33 2    2  Anxiety  F41 9        Goals addressed in session: Goal 1 and Goal 2     Current suicide risk : Low     D: Clinician met with Leatha Wetzel in person for individual therapy  Clinician facilitated SILVIO-7 of 20 and PHQ-9 of 23  Clinician discussed scores over time and lack of progress made towards alleviation of symptoms  Leatha Wetzel reports being hopeful that medication will be helpful in regulating mood and reports follow up appointment with PCP in 4 weeks to discuss medication  Clinician discussed importance of taking medication as prescribed and reporting any side effects to his provider  Leatha Wetzel reports apprehension in returning to work and feels he is going to struggle with concentration and following through with tasks  HE also reports increased anxiety symptoms such as racing heart rate, tightness in his chest, racing thoughts, trouble sleeping and digestive issues  He states that he has long approximated 20 points in the past 6 months  Clinician completed necessary disability paperwork and return to work paperwork during the session and went over responses with Leatha Wetzel  Clinician encouraged use of learned coping skills to manage symptoms  A: Leatha Wetzel was fidgety and restless during the session  He reports both anxiety and depressive symptoms as being higher than normal and struggling to control racing thoughts  He denies any SI/HI plan or intent  P: Continue to meet with Leatha Wetzel weekly for individual therapy  Behavioral Health Treatment Plan [facility]: Diagnosis and Treatment Plan explained to Carie Pedroza relates understanding diagnosis and is agreeable to Treatment Plan   Yes

## 2022-09-08 ENCOUNTER — SOCIAL WORK (OUTPATIENT)
Dept: BEHAVIORAL/MENTAL HEALTH CLINIC | Facility: CLINIC | Age: 57
End: 2022-09-08
Payer: COMMERCIAL

## 2022-09-08 DIAGNOSIS — F41.9 ANXIETY: ICD-10-CM

## 2022-09-08 DIAGNOSIS — F33.2 SEVERE EPISODE OF RECURRENT MAJOR DEPRESSIVE DISORDER, WITHOUT PSYCHOTIC FEATURES (HCC): Primary | ICD-10-CM

## 2022-09-08 PROCEDURE — 90834 PSYTX W PT 45 MINUTES: CPT | Performed by: COUNSELOR

## 2022-09-13 NOTE — PSYCH
Psychotherapy Provided: Individual Psychotherapy 50 minutes     Length of time in session: 50 minutes, follow up in 1 week    Encounter Diagnosis     ICD-10-CM    1  Severe episode of recurrent major depressive disorder, without psychotic features ([facility] [address] )  F33 2    2  Anxiety  F41 9        Goals addressed in session: Goal 1 and Goal 2     Current suicide risk : Low     D: Clinician met with Khushi Hogue in person for individual therapy  Khushi Hogue reports frustration with work with leave administration and getting back to work  He reports that he has not gotten clearance to return to work  Clinician explored his follow up with leave administration and long term disability  He reports being denied his long term disability and this causing increased financial stress and strain  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 and attempts at communicating long term plans for his granddaughter with his daughter  Clinician explored communication techniques and desire to plan for future in order to decrease stress  A: Khushi Hogue reports high anxiety with unknown start date with going back to work and no knowing what to expect yet  Clinician encouraged calming techniques to assist with anxiety  He reports continuing to take his medication as prescribed and need to make follow up appointment with his PCP after his provider canceled his MM appointment  P:Continue to meet with Khushi Hogue weekly for individual therapy  Behavioral Health Treatment Plan [facility]: Diagnosis and Treatment Plan explained to Jenna Alvarezce relates understanding diagnosis and is agreeable to Treatment Plan   Yes

## 2022-09-15 ENCOUNTER — SOCIAL WORK (OUTPATIENT)
Dept: BEHAVIORAL/MENTAL HEALTH CLINIC | Facility: CLINIC | Age: 57
End: 2022-09-15
Payer: COMMERCIAL

## 2022-09-15 ENCOUNTER — TELEPHONE (OUTPATIENT)
Dept: PSYCHIATRY | Facility: CLINIC | Age: 57
End: 2022-09-15

## 2022-09-15 DIAGNOSIS — F33.2 SEVERE EPISODE OF RECURRENT MAJOR DEPRESSIVE DISORDER, WITHOUT PSYCHOTIC FEATURES (HCC): ICD-10-CM

## 2022-09-15 DIAGNOSIS — F41.9 ANXIETY: Primary | ICD-10-CM

## 2022-09-15 PROCEDURE — 90834 PSYTX W PT 45 MINUTES: CPT | Performed by: COUNSELOR

## 2022-09-15 NOTE — PROGRESS NOTES
09/15/22 4752   Over the last 2 weeks, how often have you been bothered by any of the following problems? Feeling nervous, anxious, or on edge 3   Not being able to stop or control worrying 3   Worrying too much about different things 3   Trouble relaxing 2   Being so restless that it is hard to sit still 2   Becoming easily annoyed or irritable 3   Feeling afraid as if something awful might happen 3   SILVIO-7 Total Score 19   If you checked off any problems on this questionnaire,   How difficult have these problems made it for you to do your work, take care of things at home, or get along with other people?  Very difficult

## 2022-09-15 NOTE — TELEPHONE ENCOUNTER
Patient was seen in office 9/15/22 by provider  Upon check out, patient filled out an LOTUS and office scanned it into patient's chart

## 2022-09-20 NOTE — PSYCH
Psychotherapy Provided: Individual Psychotherapy 55 minutes     Length of time in session: 55 minutes, follow up in 1 week    Encounter Diagnosis     ICD-10-CM    1  Anxiety  F41 9    2  Severe episode of recurrent major depressive disorder, without psychotic features ([facility] [address] )  F33 2        Goals addressed in session: Goal 1 and Goal 2     Current suicide risk : Low     D: Clinician met with Jennifer Nicolas in person for individual therapy  Jennifer Nicolas discussed follow through with back to work paperwork and attempting to get details about his start date  He reports frustration with lack of clear direct communication from his employer and being unsure of next steps  Jennifer Nicolas processed concerns related to re engaging in the same work environment and fear of being thrown back into the same work environment that increase stress and anxiety due to poor treatment and lack of support  Clinician validated feeling and discussed ways to cope with stress and utilize healthy communication of needs to his managers  Clinician administered the PHQ-9 which Jennifer Nicolas scored 21 and GAD7 which he scored 23  Both scores are consistent with sever depression and anxiety and have been consistent with his scores overtime  A: Jennifer Nicolas was engaged in the session and process emotions appropriately  He presented with depressed mood and affect and noticeable anxious which was evidence by his having trouble remaining still in his seat and maintaining eye contact  Jennifer Nicolas reports taking his medication as prescribed and following up with his PCP about his medication in the next few weeks  P: Continue to meet with Jennifer Nicolas weekly for individual therapy  Behavioral Health Treatment Plan [facility]: Diagnosis and Treatment Plan explained to Nery Esperanza relates understanding diagnosis and is agreeable to Treatment Plan   Yes

## 2022-09-28 LAB
ALBUMIN SERPL-MCNC: 4.6 G/DL (ref 3.6–5.1)
ALBUMIN/GLOB SERPL: 1.9 (CALC) (ref 1–2.5)
ALP SERPL-CCNC: 69 U/L (ref 35–144)
ALT SERPL-CCNC: 26 U/L (ref 9–46)
AST SERPL-CCNC: 18 U/L (ref 10–35)
BILIRUB SERPL-MCNC: 0.7 MG/DL (ref 0.2–1.2)
BUN SERPL-MCNC: 13 MG/DL (ref 7–25)
BUN/CREAT SERPL: ABNORMAL (CALC) (ref 6–22)
CALCIUM SERPL-MCNC: 9.4 MG/DL (ref 8.6–10.3)
CHLORIDE SERPL-SCNC: 101 MMOL/L (ref 98–110)
CHOLEST SERPL-MCNC: 173 MG/DL
CHOLEST/HDLC SERPL: 2.9 (CALC)
CO2 SERPL-SCNC: 27 MMOL/L (ref 20–32)
CREAT SERPL-MCNC: 0.83 MG/DL (ref 0.7–1.3)
ERYTHROCYTE [DISTWIDTH] IN BLOOD BY AUTOMATED COUNT: 12.3 % (ref 11–15)
GFR/BSA.PRED SERPLBLD CYS-BASED-ARV: 102 ML/MIN/1.73M2
GLOBULIN SER CALC-MCNC: 2.4 G/DL (CALC) (ref 1.9–3.7)
GLUCOSE SERPL-MCNC: 100 MG/DL (ref 65–99)
HCT VFR BLD AUTO: 41.7 % (ref 38.5–50)
HDLC SERPL-MCNC: 59 MG/DL
HGB BLD-MCNC: 14.6 G/DL (ref 13.2–17.1)
LDLC SERPL CALC-MCNC: 89 MG/DL (CALC)
MCH RBC QN AUTO: 30.8 PG (ref 27–33)
MCHC RBC AUTO-ENTMCNC: 35 G/DL (ref 32–36)
MCV RBC AUTO: 88 FL (ref 80–100)
NONHDLC SERPL-MCNC: 114 MG/DL (CALC)
PLATELET # BLD AUTO: 281 THOUSAND/UL (ref 140–400)
PMV BLD REES-ECKER: 9.2 FL (ref 7.5–12.5)
POTASSIUM SERPL-SCNC: 4.3 MMOL/L (ref 3.5–5.3)
PROT SERPL-MCNC: 7 G/DL (ref 6.1–8.1)
RBC # BLD AUTO: 4.74 MILLION/UL (ref 4.2–5.8)
SODIUM SERPL-SCNC: 138 MMOL/L (ref 135–146)
TRIGL SERPL-MCNC: 148 MG/DL
TSH SERPL-ACNC: 1.52 MIU/L (ref 0.4–4.5)
WBC # BLD AUTO: 4.7 THOUSAND/UL (ref 3.8–10.8)

## 2022-09-29 ENCOUNTER — TELEPHONE (OUTPATIENT)
Dept: PSYCHIATRY | Facility: CLINIC | Age: 57
End: 2022-09-29

## 2022-09-29 NOTE — TELEPHONE ENCOUNTER
Pt called to cancel appt, had Covid shot yesterday and is just not feeling good   Will see provider at next appt

## 2022-10-11 PROBLEM — H61.21 IMPACTED CERUMEN OF RIGHT EAR: Status: RESOLVED | Noted: 2022-05-24 | Resolved: 2022-10-11

## 2022-10-12 PROBLEM — Z12.11 COLON CANCER SCREENING: Status: RESOLVED | Noted: 2021-05-10 | Resolved: 2022-10-12

## 2022-11-10 ENCOUNTER — TELEPHONE (OUTPATIENT)
Dept: INTERNAL MEDICINE CLINIC | Facility: OTHER | Age: 57
End: 2022-11-10

## 2022-11-10 DIAGNOSIS — F33.2 SEVERE EPISODE OF RECURRENT MAJOR DEPRESSIVE DISORDER, WITHOUT PSYCHOTIC FEATURES (HCC): ICD-10-CM

## 2022-11-10 DIAGNOSIS — F41.9 ANXIETY: ICD-10-CM

## 2022-11-10 NOTE — TELEPHONE ENCOUNTER
Right now just fill sertraline 50 mg daily as before  Will need office evaluation before we increase the does and advised him to make follow-up appointment  Please notify patient    Thanks

## 2022-11-10 NOTE — TELEPHONE ENCOUNTER
LMOM for Facundo Mack to call the office for a f/u since he is over due and to discuss the increase of the sertraline   Will fill a 30 day supply of the 50 mg until than

## 2022-11-10 NOTE — TELEPHONE ENCOUNTER
LOV: 8/5/22  NOV: 10/10/22 was canceled      Patient looking for a refill on his sertraline and asking it to be increased from 50 to 100 mg as per last office visit he states   Would you like for him to come in to see you first, he said he is out of his medication so not sure if we should just fill 50 mg until seen

## 2022-11-22 ENCOUNTER — OFFICE VISIT (OUTPATIENT)
Dept: INTERNAL MEDICINE CLINIC | Facility: OTHER | Age: 57
End: 2022-11-22

## 2022-11-22 VITALS
HEART RATE: 73 BPM | DIASTOLIC BLOOD PRESSURE: 86 MMHG | OXYGEN SATURATION: 97 % | HEIGHT: 70 IN | SYSTOLIC BLOOD PRESSURE: 138 MMHG | BODY MASS INDEX: 29.63 KG/M2 | WEIGHT: 207 LBS | TEMPERATURE: 98.3 F

## 2022-11-22 DIAGNOSIS — R73.9 HYPERGLYCEMIA: ICD-10-CM

## 2022-11-22 DIAGNOSIS — F33.2 SEVERE EPISODE OF RECURRENT MAJOR DEPRESSIVE DISORDER, WITHOUT PSYCHOTIC FEATURES (HCC): ICD-10-CM

## 2022-11-22 DIAGNOSIS — E78.2 MIXED HYPERLIPIDEMIA: ICD-10-CM

## 2022-11-22 DIAGNOSIS — G47.33 OSA (OBSTRUCTIVE SLEEP APNEA): Primary | ICD-10-CM

## 2022-11-22 DIAGNOSIS — F41.9 ANXIETY: ICD-10-CM

## 2022-11-22 DIAGNOSIS — I10 HYPERTENSION, BENIGN: ICD-10-CM

## 2022-11-22 RX ORDER — SERTRALINE HYDROCHLORIDE 100 MG/1
100 TABLET, FILM COATED ORAL DAILY
Qty: 90 TABLET | Refills: 0 | Status: SHIPPED | OUTPATIENT
Start: 2022-11-22

## 2022-11-22 NOTE — ASSESSMENT & PLAN NOTE
Hemoglobin A1c is in normal range  Fasting blood sugar slightly elevated    Advised for low carb diet and exercise

## 2022-11-22 NOTE — PROGRESS NOTES
Assessment/Plan:    HORTENSIA (obstructive sleep apnea)  Doing well on CPAP machine  Well compliance    Hypertension, benign  Stable on present regimen  Advised for better diet control and exercise    Hyperlipidemia  Lipid profile is acceptable on present dose of Lipitor 40 mg daily  Continue with low-fat diet and exercise    Hyperglycemia  Hemoglobin A1c is in normal range  Fasting blood sugar slightly elevated  Advised for low carb diet and exercise    Severe episode of recurrent major depressive disorder, without psychotic features ([facility] [address] )  Will increase sertraline from 50 mg to 100 mg daily  Also referred patient to psychiatrist for further evaluation and management  Continue with the counseling session  Diagnoses and all orders for this visit:    HORTENSIA (obstructive sleep apnea)    Hypertension, benign    Mixed hyperlipidemia    Anxiety  -     sertraline (Zoloft) 100 mg tablet; Take 1 tablet (100 mg total) by mouth daily  -     Ambulatory Referral to Psychiatry; Future    Hyperglycemia    Severe episode of recurrent major depressive disorder, without psychotic features ([address] )  -     sertraline (Zoloft) 100 mg tablet; Take 1 tablet (100 mg total) by mouth daily  -     Ambulatory Referral to Psychiatry; Future               Subjective:          Patient ID: Jasmin Delgado is a 62 y o  male  Patient is here for follow-up  Also complaining of worsening episode of depression  As per patient most of the time he feels more depressed than normal   Presently also undergoing counseling session once a week  Hypertension  Pertinent negatives include no chest pain, headaches, neck pain, palpitations or shortness of breath  Depression  Pertinent negatives include no abdominal pain, arthralgias, chest pain, congestion, coughing, fatigue, fever, headaches, nausea, neck pain, rash, sore throat, vomiting or weakness     Ear Fullness   Pertinent negatives include no abdominal pain, coughing, diarrhea, ear discharge, headaches, neck pain, rash, sore throat or vomiting  The following portions of the patient's history were reviewed and updated as appropriate: allergies, current medications, past family history, past medical history, past social history, past surgical history and problem list     Review of Systems   Constitutional: Negative for fatigue and fever  HENT: Negative for congestion, ear discharge, ear pain, postnasal drip, sinus pressure, sore throat, tinnitus and trouble swallowing  Eyes: Negative for discharge, itching and visual disturbance  Respiratory: Negative for cough and shortness of breath  Cardiovascular: Negative for chest pain and palpitations  Gastrointestinal: Negative for abdominal pain, diarrhea, nausea and vomiting  Endocrine: Negative for cold intolerance and polyuria  Genitourinary: Negative for difficulty urinating, dysuria and urgency  Musculoskeletal: Negative for arthralgias and neck pain  Skin: Negative for rash  Allergic/Immunologic: Negative for environmental allergies  Neurological: Negative for dizziness, weakness and headaches  Psychiatric/Behavioral: Positive for depression  Negative for agitation and behavioral problems  The patient is not nervous/anxious            Past Medical History:   Diagnosis Date   • Colon polyp    • COVID-19 11/28/2020   • Hypercholesteremia    • Hypertension          Current Outpatient Medications:   •  atorvastatin (LIPITOR) 40 mg tablet, Take 1 tablet (40 mg total) by mouth daily, Disp: 90 tablet, Rfl: 1  •  FIBER ADULT GUMMIES PO, Take by mouth 5 grams daily, Disp: , Rfl:   •  Fish Oil-Cholecalciferol (OMEGA-3 + VITAMIN D3 PO), Take 2 capsules by mouth Each capsule = 1000 units of vitamin d3, Disp: , Rfl:   •  losartan (COZAAR) 100 MG tablet, Take 1 tablet (100 mg total) by mouth daily, Disp: 90 tablet, Rfl: 1  •  multivitamin (THERAGRAN) TABS, Take 1 tablet by mouth daily, Disp: , Rfl:   •  sertraline (Zoloft) 100 mg tablet, Take 1 tablet (100 mg total) by mouth daily, Disp: 90 tablet, Rfl: 0  •  zolpidem (AMBIEN CR) 6 25 MG CR tablet, TAKE 1 TABLET (6 25 MG TOTAL) BY MOUTH DAILY AT BEDTIME AS NEEDED FOR SLEEP, Disp: 30 tablet, Rfl: 5  •  Ascorbic Acid (vitamin C) 1000 MG tablet, Take 1,000 mg by mouth daily (Patient not taking: Reported on 11/22/2022), Disp: , Rfl:   •  cholecalciferol (VITAMIN D3) 1,000 units tablet, Take 1,000 Units by mouth daily (Patient not taking: Reported on 11/22/2022), Disp: , Rfl:   •  Omega-3 Fatty Acids (OMEGA-3 FISH OIL PO), Take 1,080 mL by mouth daily (Patient not taking: Reported on 11/22/2022), Disp: , Rfl:     No Known Allergies    Social History   Past Surgical History:   Procedure Laterality Date   • COLONOSCOPY     • DC COLONOSCOPY FLX DX W/COLLJ SPEC WHEN PFRMD N/A 5/9/2016    Procedure: COLONOSCOPY;  Surgeon: Jada Cartagena MD;  Location: [facility]; Service: Gastroenterology   • DC ESOPHAGOGASTRODUODENOSCOPY TRANSORAL DIAGNOSTIC N/A 5/9/2016    Procedure: ESOPHAGOGASTRODUODENOSCOPY (EGD); Surgeon: Jada Cartagena MD;  Location: [facility]; Service: Gastroenterology   • SKIN BIOPSY      Each additional lesion   • UPPER GASTROINTESTINAL ENDOSCOPY     • WISDOM TOOTH EXTRACTION       Family History   Problem Relation Age of Onset   • Hypertension Mother    • Leukemia Mother    • Lung cancer Father    • Hypertension Brother    • Leukemia Family    • Leukemia Family        Objective:  /86   Pulse 73   Temp 98 3 °F (36 8 °C) (Temporal)   Ht 5' 9 5" (1 765 m)   Wt 93 9 kg (207 lb)   SpO2 97%   BMI 30 13 kg/m²   Body mass index is 30 13 kg/m²  Physical Exam  Constitutional:       Appearance: He is well-developed  He is not ill-appearing or diaphoretic  HENT:      Head: Normocephalic  Right Ear: Tympanic membrane, ear canal and external ear normal       Left Ear: Tympanic membrane, ear canal and external ear normal       Nose: No rhinorrhea        Mouth/Throat:      Mouth: Oropharynx is clear and moist       Pharynx: No posterior oropharyngeal erythema  Eyes:      General: No scleral icterus  Pupils: Pupils are equal, round, and reactive to light  Neck:      Thyroid: No thyromegaly  Trachea: No tracheal deviation  Cardiovascular:      Rate and Rhythm: Normal rate and regular rhythm  Heart sounds: Normal heart sounds  Pulmonary:      Effort: Pulmonary effort is normal  No respiratory distress  Breath sounds: Normal breath sounds  Chest:      Chest wall: No tenderness  Abdominal:      General: Bowel sounds are normal       Palpations: Abdomen is soft  There is no mass  Tenderness: There is no abdominal tenderness  Musculoskeletal:         General: Normal range of motion  Cervical back: Normal range of motion and neck supple  Left lower leg: No edema  Lymphadenopathy:      Cervical: No cervical adenopathy  Skin:     General: Skin is warm  Neurological:      Mental Status: He is alert and oriented to person, place, and time  Cranial Nerves: No cranial nerve deficit  Sensory: No sensory deficit     Psychiatric:         Mood and Affect: Mood and affect and mood normal          Behavior: Behavior normal

## 2022-11-22 NOTE — ASSESSMENT & PLAN NOTE
Lipid profile is acceptable on present dose of Lipitor 40 mg daily    Continue with low-fat diet and exercise

## 2022-11-22 NOTE — ASSESSMENT & PLAN NOTE
Will increase sertraline from 50 mg to 100 mg daily  Also referred patient to psychiatrist for further evaluation and management  Continue with the counseling session

## 2022-11-23 ENCOUNTER — TELEPHONE (OUTPATIENT)
Dept: PSYCHIATRY | Facility: CLINIC | Age: 57
End: 2022-11-23

## 2023-01-31 ENCOUNTER — OFFICE VISIT (OUTPATIENT)
Dept: SLEEP CENTER | Facility: CLINIC | Age: 58
End: 2023-01-31

## 2023-01-31 VITALS
HEIGHT: 70 IN | SYSTOLIC BLOOD PRESSURE: 138 MMHG | WEIGHT: 209 LBS | DIASTOLIC BLOOD PRESSURE: 92 MMHG | BODY MASS INDEX: 29.92 KG/M2

## 2023-01-31 DIAGNOSIS — F51.04 PSYCHOPHYSIOLOGICAL INSOMNIA: ICD-10-CM

## 2023-01-31 DIAGNOSIS — G47.33 OSA (OBSTRUCTIVE SLEEP APNEA): Primary | ICD-10-CM

## 2023-01-31 RX ORDER — TRAZODONE HYDROCHLORIDE 50 MG/1
TABLET ORAL
Qty: 90 TABLET | Refills: 5 | Status: SHIPPED | OUTPATIENT
Start: 2023-01-31

## 2023-01-31 NOTE — PROGRESS NOTES
Progress Note - Sleep Center   Neal Fall HNT:[birth year] MRN: [id number]      Reason for Visit:  62 y  o male here for annual follow-up    Assessment:  Doing well on current therapy of APAP 10 to 15 cm for severe HORTENSIA (AHI = 31 8)  The patient's insomnia is more poorly controlled with Ambien CR  Given his tolerance, I will change to trazodone  Plan:  Trazodone 50 mg titrated to 150 mg by mouth at bedtime    Follow up: One year    History of Present Illness:  History of HORTENSIA on PAP therapy  Fully compliant and deriving benefit  Historical Information    Past Medical History:   Past Medical History:   Diagnosis Date   • Colon polyp    • COVID-19 11/28/2020   • Hypercholesteremia    • Hypertension          Past Surgical History:   Past Surgical History:   Procedure Laterality Date   • COLONOSCOPY     • CT COLONOSCOPY FLX DX W/COLLJ SPEC WHEN PFRMD N/A 5/9/2016    Procedure: COLONOSCOPY;  Surgeon: Hartwell Sandifer, MD;  Location: [facility]; Service: Gastroenterology   • CT ESOPHAGOGASTRODUODENOSCOPY TRANSORAL DIAGNOSTIC N/A 5/9/2016    Procedure: ESOPHAGOGASTRODUODENOSCOPY (EGD); Surgeon: Hartwell Sandifer, MD;  Location: [facility]; Service: Gastroenterology   • SKIN BIOPSY      Each additional lesion   • UPPER GASTROINTESTINAL ENDOSCOPY     • WISDOM TOOTH EXTRACTION         Social History:   Social History     Socioeconomic History   • Marital status: Single     Spouse name: Not on file   • Number of children: Not on file   • Years of education: Not on file   • Highest education level: Not on file   Occupational History   • Not on file   Tobacco Use   • Smoking status: Never   • Smokeless tobacco: Never   Vaping Use   • Vaping Use: Never used   Substance and Sexual Activity   • Alcohol use:  Yes     Alcohol/week: 3 0 standard drinks     Types: 3 Glasses of wine per week     Comment: 3 glasses 3 times per week   • Drug use: No   • Sexual activity: Not Currently   Other Topics Concern   • Not on file   Social History Narrative    Caffeine use     Social Determinants of Health     Financial Resource Strain: Not on file   Food Insecurity: Not on file   Transportation Needs: Not on file   Physical Activity: Not on file   Stress: Not on file   Social Connections: Not on file   Intimate Partner Violence: Not on file   Housing Stability: Not on file       Family History:   Family History   Problem Relation Age of Onset   • Hypertension Mother    • Leukemia Mother    • Lung cancer Father    • Hypertension Brother    • Leukemia Family    • Leukemia Family        Medications/Allergies:      Current Outpatient Medications:   •  atorvastatin (LIPITOR) 40 mg tablet, Take 1 tablet (40 mg total) by mouth daily, Disp: 90 tablet, Rfl: 1  •  FIBER ADULT GUMMIES PO, Take by mouth 5 grams daily, Disp: , Rfl:   •  Fish Oil-Cholecalciferol (OMEGA-3 + VITAMIN D3 PO), Take 2 capsules by mouth Each capsule = 1000 units of vitamin d3, Disp: , Rfl:   •  losartan (COZAAR) 100 MG tablet, Take 1 tablet (100 mg total) by mouth daily, Disp: 90 tablet, Rfl: 1  •  multivitamin (THERAGRAN) TABS, Take 1 tablet by mouth daily, Disp: , Rfl:   •  sertraline (Zoloft) 100 mg tablet, Take 1 tablet (100 mg total) by mouth daily, Disp: 90 tablet, Rfl: 0  •  traZODone (DESYREL) 50 mg tablet, 1-3 po qhs, Disp: 90 tablet, Rfl: 5  •  zolpidem (AMBIEN CR) 6 25 MG CR tablet, TAKE 1 TABLET (6 25 MG TOTAL) BY MOUTH DAILY AT BEDTIME AS NEEDED FOR SLEEP, Disp: 30 tablet, Rfl: 5  •  Ascorbic Acid (vitamin C) 1000 MG tablet, Take 1,000 mg by mouth daily (Patient not taking: Reported on 11/22/2022), Disp: , Rfl:   •  cholecalciferol (VITAMIN D3) 1,000 units tablet, Take 1,000 Units by mouth daily (Patient not taking: Reported on 11/22/2022), Disp: , Rfl:   •  Omega-3 Fatty Acids (OMEGA-3 FISH OIL PO), Take 1,080 mL by mouth daily (Patient not taking: Reported on 11/22/2022), Disp: , Rfl:           Objective      Vital Signs:   Vitals:    01/31/23 1337   BP: 138/92     Bena Sleepiness Scale:          Physical Exam:    General: Alert, appropriate, cooperative, overweight    Head: NC/AT    Skin: Warm, dry    Neuro: No motor abnormalities, cranial nerves appear intact    Extremity: No clubbing, cyanosis      DME Provider: [employer]        Counseling / Coordination of Care   I have spent 20 minutes with the patient today in which greater than 50% of this time was spent in counseling/coordination of care regarding: equipment and compliance  Board Certified Sleep Specialist    Portions of the record may have been created with voice recognition software  Occasional wrong word or "sound a like" substitutions may have occurred due to the inherent limitations of voice recognition software  Read the chart carefully and recognize, using context, where substitutions have occurred

## 2023-02-02 ENCOUNTER — TELEPHONE (OUTPATIENT)
Dept: SLEEP CENTER | Facility: CLINIC | Age: 58
End: 2023-02-02

## 2023-02-02 NOTE — TELEPHONE ENCOUNTER
Patient left message that Dr Smita Nunn ordered traZODone (DESYREL) 50 mg tablet   For him at his office visit on 1/31/2023    Patient reports that he is already taking sertraline (Zoloft) 100 mg tablet   And feels that Zolpidem 12 5 mg ER would be a better choice of medication  Returned the patient's call    He researched the Trazodone  Patient  feels he does not need another antidepressant and it also says not to drink alcohol with it     Patient states he is afraid he will overdose if he takes     Dr Smita Nunn please advise

## 2023-02-03 LAB
DME PARACHUTE DELIVERY DATE ACTUAL: NORMAL
DME PARACHUTE DELIVERY DATE REQUESTED: NORMAL
DME PARACHUTE ITEM DESCRIPTION: NORMAL
DME PARACHUTE ORDER STATUS: NORMAL
DME PARACHUTE SUPPLIER NAME: NORMAL
DME PARACHUTE SUPPLIER PHONE: NORMAL

## 2023-02-06 NOTE — TELEPHONE ENCOUNTER
Left message for the patient to hold off on taking Trazodone   Dr Sobia Wilkins is planning to contact him to discuss medications

## 2023-02-08 DIAGNOSIS — F51.04 PSYCHOPHYSIOLOGICAL INSOMNIA: Primary | ICD-10-CM

## 2023-02-08 RX ORDER — ZOLPIDEM TARTRATE 12.5 MG/1
12.5 TABLET, FILM COATED, EXTENDED RELEASE ORAL
Qty: 30 TABLET | Refills: 5 | Status: SHIPPED | OUTPATIENT
Start: 2023-02-08

## 2023-02-19 DIAGNOSIS — F51.04 PSYCHOPHYSIOLOGICAL INSOMNIA: ICD-10-CM

## 2023-02-20 RX ORDER — TRAZODONE HYDROCHLORIDE 50 MG/1
TABLET ORAL
Qty: 270 TABLET | Refills: 2 | Status: SHIPPED | OUTPATIENT
Start: 2023-02-20

## 2023-02-21 DIAGNOSIS — F41.9 ANXIETY: ICD-10-CM

## 2023-02-21 DIAGNOSIS — E78.2 MIXED HYPERLIPIDEMIA: ICD-10-CM

## 2023-02-21 DIAGNOSIS — F33.2 SEVERE EPISODE OF RECURRENT MAJOR DEPRESSIVE DISORDER, WITHOUT PSYCHOTIC FEATURES (HCC): ICD-10-CM

## 2023-02-21 RX ORDER — ATORVASTATIN CALCIUM 40 MG/1
40 TABLET, FILM COATED ORAL DAILY
Qty: 90 TABLET | Refills: 1 | Status: SHIPPED | OUTPATIENT
Start: 2023-02-21

## 2023-02-21 RX ORDER — SERTRALINE HYDROCHLORIDE 100 MG/1
100 TABLET, FILM COATED ORAL DAILY
Qty: 90 TABLET | Refills: 0 | Status: SHIPPED | OUTPATIENT
Start: 2023-02-21

## 2023-03-15 ENCOUNTER — TELEPHONE (OUTPATIENT)
Dept: INTERNAL MEDICINE CLINIC | Facility: OTHER | Age: 58
End: 2023-03-15

## 2023-03-15 DIAGNOSIS — R73.9 HYPERGLYCEMIA: ICD-10-CM

## 2023-03-15 DIAGNOSIS — E78.2 MIXED HYPERLIPIDEMIA: Primary | ICD-10-CM

## 2023-03-15 DIAGNOSIS — I10 HYPERTENSION, BENIGN: ICD-10-CM

## 2023-03-15 NOTE — TELEPHONE ENCOUNTER
Kristi Miramontes calling asking what labs do you want done for his next appointment, there are no active labs in his chart but he said something about a glucose test.

## 2023-03-23 LAB
ALT SERPL-CCNC: 32 U/L (ref 9–46)
AST SERPL-CCNC: 24 U/L (ref 10–35)
BUN SERPL-MCNC: 14 MG/DL (ref 7–25)
BUN/CREAT SERPL: ABNORMAL (CALC) (ref 6–22)
CALCIUM SERPL-MCNC: 9.6 MG/DL (ref 8.6–10.3)
CHLORIDE SERPL-SCNC: 102 MMOL/L (ref 98–110)
CHOLEST SERPL-MCNC: 182 MG/DL
CHOLEST/HDLC SERPL: 3.3 (CALC)
CO2 SERPL-SCNC: 29 MMOL/L (ref 20–32)
CREAT SERPL-MCNC: 0.89 MG/DL (ref 0.7–1.3)
GFR/BSA.PRED SERPLBLD CYS-BASED-ARV: 100 ML/MIN/1.73M2
GLUCOSE SERPL-MCNC: 117 MG/DL (ref 65–99)
HBA1C MFR BLD: 5.4 % OF TOTAL HGB
HDLC SERPL-MCNC: 55 MG/DL
LDLC SERPL CALC-MCNC: 103 MG/DL (CALC)
NONHDLC SERPL-MCNC: 127 MG/DL (CALC)
POTASSIUM SERPL-SCNC: 4.2 MMOL/L (ref 3.5–5.3)
SODIUM SERPL-SCNC: 139 MMOL/L (ref 135–146)
TRIGL SERPL-MCNC: 140 MG/DL

## 2023-03-28 ENCOUNTER — OFFICE VISIT (OUTPATIENT)
Dept: INTERNAL MEDICINE CLINIC | Facility: OTHER | Age: 58
End: 2023-03-28

## 2023-03-28 VITALS
DIASTOLIC BLOOD PRESSURE: 78 MMHG | TEMPERATURE: 97.8 F | HEIGHT: 70 IN | WEIGHT: 215 LBS | SYSTOLIC BLOOD PRESSURE: 132 MMHG | HEART RATE: 73 BPM | OXYGEN SATURATION: 98 % | BODY MASS INDEX: 30.78 KG/M2

## 2023-03-28 DIAGNOSIS — G54.2 CERVICAL NEUROPATHY: ICD-10-CM

## 2023-03-28 DIAGNOSIS — E78.2 MIXED HYPERLIPIDEMIA: ICD-10-CM

## 2023-03-28 DIAGNOSIS — G47.33 OSA (OBSTRUCTIVE SLEEP APNEA): Primary | ICD-10-CM

## 2023-03-28 DIAGNOSIS — R73.9 HYPERGLYCEMIA: ICD-10-CM

## 2023-03-28 DIAGNOSIS — I10 HYPERTENSION, BENIGN: ICD-10-CM

## 2023-03-28 DIAGNOSIS — F33.2 SEVERE EPISODE OF RECURRENT MAJOR DEPRESSIVE DISORDER, WITHOUT PSYCHOTIC FEATURES (HCC): ICD-10-CM

## 2023-03-28 NOTE — PROGRESS NOTES
Assessment/Plan:    HORTENSIA (obstructive sleep apnea)  Well compliant with CPAP machine  Hypertension, benign  Blood pressure is stable with present regimen  Continue with low-salt diet and exercise    Cervical neuropathy  Will refer patient for physical therapy  Can take Tylenol as needed    Hyperlipidemia  LDL is slightly elevated  Patient did gain about 10 pounds since last visit  Advised for low-fat diet, exercise and weight loss  Continue with atorvastatin 40 mg for now    Hyperglycemia  Hemoglobin A1c is 5 4  Blood fasting blood sugar is slightly elevated  Advised for low sugar/low-carb diet and exercise    Severe episode of recurrent major depressive disorder, without psychotic features [facility])  Patient is doing well on present regimen  He also stopped counseling and feeling better  Diagnoses and all orders for this visit:    HORTENSIA (obstructive sleep apnea)    Hypertension, benign    Mixed hyperlipidemia    Hyperglycemia    Severe episode of recurrent major depressive disorder, without psychotic features ([facility]    Cervical neuropathy  -     Ambulatory Referral to Physical Therapy; Future          BMI Counseling: Body mass index is 31 29 kg/m²  The BMI is above normal  Nutrition recommendations include decreasing portion sizes, encouraging healthy choices of fruits and vegetables, decreasing fast food intake, consuming healthier snacks and limiting drinks that contain sugar  Exercise recommendations include moderate physical activity 150 minutes/week and exercising 3-5 times per week  No pharmacotherapy was ordered  Rationale for BMI follow-up plan is due to patient being overweight or obese  Subjective:          Patient ID: Gregoria Lozoya is a 62 y o  male  Patient is here for regular follow-up  Also have blood work done would like to discuss results  Also complaining of her lower neck pain increased with movements    No tingling in either extremity      The following portions of the patient's history were reviewed and updated as appropriate: allergies, current medications, past family history, past medical history, past social history, past surgical history and problem list     Review of Systems   Constitutional: Negative for fatigue and fever  HENT: Negative for congestion, ear discharge, ear pain, postnasal drip, sinus pressure, sore throat, tinnitus and trouble swallowing  Eyes: Negative for discharge, itching and visual disturbance  Respiratory: Negative for cough and shortness of breath  Cardiovascular: Negative for chest pain and palpitations  Gastrointestinal: Negative for abdominal pain, diarrhea, nausea and vomiting  Endocrine: Negative for cold intolerance and polyuria  Genitourinary: Negative for difficulty urinating, dysuria and urgency  Musculoskeletal: Negative for arthralgias and neck pain  Complaining of bilateral lower neck pain along with trapezius muscles area   Skin: Negative for rash  Allergic/Immunologic: Negative for environmental allergies  Neurological: Negative for dizziness, weakness and headaches  Psychiatric/Behavioral: Negative for agitation and behavioral problems  The patient is not nervous/anxious            Past Medical History:   Diagnosis Date   • Colon polyp    • COVID-19 11/28/2020   • Hypercholesteremia    • Hypertension          Current Outpatient Medications:   •  atorvastatin (LIPITOR) 40 mg tablet, Take 1 tablet (40 mg total) by mouth daily, Disp: 90 tablet, Rfl: 1  •  FIBER ADULT GUMMIES PO, Take by mouth 5 grams daily, Disp: , Rfl:   •  losartan (COZAAR) 100 MG tablet, Take 1 tablet (100 mg total) by mouth daily, Disp: 90 tablet, Rfl: 1  •  multivitamin (THERAGRAN) TABS, Take 1 tablet by mouth daily, Disp: , Rfl:   •  sertraline (Zoloft) 100 mg tablet, Take 1 tablet (100 mg total) by mouth daily, Disp: 90 tablet, Rfl: 0  •  zolpidem (AMBIEN CR) 12 5 MG CR tablet, Take 1 tablet (12 5 mg total) by mouth daily at bedtime as "needed for sleep, Disp: 30 tablet, Rfl: 5  •  traZODone (DESYREL) 50 mg tablet, TAKE 1 TO 3 TABLETS BY MOUTH AT BEDTIME    (Patient not taking: Reported on 3/28/2023), Disp: 270 tablet, Rfl: 2    No Known Allergies    Social History   Past Surgical History:   Procedure Laterality Date   • COLONOSCOPY     • DC COLONOSCOPY FLX DX W/COLLJ SPEC WHEN PFRMD N/A 5/9/2016    Procedure: COLONOSCOPY;  Surgeon: Alana Garcia MD;  Location: [facility]; Service: Gastroenterology   • DC ESOPHAGOGASTRODUODENOSCOPY TRANSORAL DIAGNOSTIC N/A 5/9/2016    Procedure: ESOPHAGOGASTRODUODENOSCOPY (EGD); Surgeon: Alana Garcia MD;  Location: [facility]; Service: Gastroenterology   • SKIN BIOPSY      Each additional lesion   • UPPER GASTROINTESTINAL ENDOSCOPY     • WISDOM TOOTH EXTRACTION       Family History   Problem Relation Age of Onset   • Hypertension Mother    • Leukemia Mother    • Lung cancer Father    • Hypertension Brother    • Leukemia Family    • Leukemia Family        Objective:  /78 (BP Location: Left arm, Patient Position: Sitting, Cuff Size: Adult)   Pulse 73   Temp 97 8 °F (36 6 °C) (Temporal)   Ht 5' 9 5\" (1 765 m)   Wt 97 5 kg (215 lb)   SpO2 98%   BMI 31 29 kg/m²   Body mass index is 31 29 kg/m²  Physical Exam  Constitutional:       Appearance: He is well-developed  He is not ill-appearing or diaphoretic  HENT:      Head: Normocephalic  Right Ear: Ear canal and external ear normal       Left Ear: Ear canal and external ear normal    Eyes:      General: No scleral icterus  Pupils: Pupils are equal, round, and reactive to light  Neck:      Thyroid: No thyromegaly  Trachea: No tracheal deviation  Cardiovascular:      Rate and Rhythm: Normal rate and regular rhythm  Heart sounds: Normal heart sounds  No murmur heard  Pulmonary:      Effort: Pulmonary effort is normal  No respiratory distress  Breath sounds: Normal breath sounds  Chest:      Chest wall: No tenderness   " Abdominal:      General: Bowel sounds are normal       Palpations: Abdomen is soft  There is no mass  Tenderness: There is no abdominal tenderness  Musculoskeletal:         General: Tenderness present  Normal range of motion  Cervical back: Normal range of motion and neck supple  Right lower leg: No edema  Left lower leg: No edema  Comments: Mild tenderness over both trapezius muscles noted  No limitation of neck movement   Lymphadenopathy:      Cervical: No cervical adenopathy  Skin:     General: Skin is warm  Neurological:      Mental Status: He is alert and oriented to person, place, and time  Cranial Nerves: No cranial nerve deficit     Psychiatric:         Mood and Affect: Mood normal          Behavior: Behavior normal

## 2023-03-28 NOTE — ASSESSMENT & PLAN NOTE
LDL is slightly elevated  Patient did gain about 10 pounds since last visit  Advised for low-fat diet, exercise and weight loss    Continue with atorvastatin 40 mg for now

## 2023-03-28 NOTE — ASSESSMENT & PLAN NOTE
Hemoglobin A1c is 5 4  Blood fasting blood sugar is slightly elevated    Advised for low sugar/low-carb diet and exercise

## 2023-04-25 DIAGNOSIS — I10 ESSENTIAL HYPERTENSION, BENIGN: ICD-10-CM

## 2023-04-25 RX ORDER — LOSARTAN POTASSIUM 100 MG/1
100 TABLET ORAL DAILY
Qty: 90 TABLET | Refills: 1 | Status: SHIPPED | OUTPATIENT
Start: 2023-04-25

## 2023-05-22 DIAGNOSIS — F33.2 SEVERE EPISODE OF RECURRENT MAJOR DEPRESSIVE DISORDER, WITHOUT PSYCHOTIC FEATURES (HCC): ICD-10-CM

## 2023-05-22 DIAGNOSIS — F41.9 ANXIETY: ICD-10-CM

## 2023-05-22 RX ORDER — SERTRALINE HYDROCHLORIDE 100 MG/1
100 TABLET, FILM COATED ORAL DAILY
Qty: 90 TABLET | Refills: 1 | Status: SHIPPED | OUTPATIENT
Start: 2023-05-22

## 2023-06-15 ENCOUNTER — EVALUATION (OUTPATIENT)
Dept: PHYSICAL THERAPY | Facility: REHABILITATION | Age: 58
End: 2023-06-15
Payer: COMMERCIAL

## 2023-06-15 DIAGNOSIS — M54.2 CHRONIC NECK PAIN: ICD-10-CM

## 2023-06-15 DIAGNOSIS — G89.29 CHRONIC NECK PAIN: ICD-10-CM

## 2023-06-15 DIAGNOSIS — M54.12 CERVICAL RADICULOPATHY: Primary | ICD-10-CM

## 2023-06-15 DIAGNOSIS — G54.2 CERVICAL NEUROPATHY: ICD-10-CM

## 2023-06-15 PROCEDURE — 97112 NEUROMUSCULAR REEDUCATION: CPT | Performed by: PHYSICAL THERAPIST

## 2023-06-15 PROCEDURE — 97140 MANUAL THERAPY 1/> REGIONS: CPT | Performed by: PHYSICAL THERAPIST

## 2023-06-15 PROCEDURE — 97162 PT EVAL MOD COMPLEX 30 MIN: CPT | Performed by: PHYSICAL THERAPIST

## 2023-06-15 NOTE — PROGRESS NOTES
PT Evaluation     Today's date: 6/15/2023  Patient name: Yasmin Aparicio  : 1965  MRN: [id number]  Referring provider: Eamon Cisneros MD  Dx:   Encounter Diagnosis     ICD-10-CM    1  Cervical radiculopathy  M54 12       2  Cervical neuropathy  G54 2 Ambulatory Referral to Physical Therapy      3  Chronic neck pain  M54 2     G89 29                      Assessment  Assessment details: Yasmin Aparicio is a pleasant 62 y o  male who presents with 4 month episode of L sided neck pain and L UE radicular symptoms  The patient's symptoms began without a specific JESSE and have gotten progressively worse in the past 4 months  He is currently limited in his ability to sit for extended periods of time, perform household chores, and sleep due to his symptoms  MDT evaluation is mechanically inconclusive for a directional preference or diagnostic category  Primary movement impairment diagnosis of cervical spine hypomobility, postural control deficit, and adverse neural tension  The evaluation is consistent with a highly irritable cervical radiculopathy  During the physical exam I was unable to determine a directional preference, patient's radicular symptoms increased with repeated movements testing in all planes  Based on these findings, I believe the patient would be most appropriate to manage symptoms with medication to address inflammatory component of his nerve root irritation  I discussed these findings with the patient and he verbalized understanding  I will be contacting his PCP to discuss my evaluation findings and collaborate on his care  If his symptom irritability level is decreased with medication then he would be appropriate for another MDT evaluation to determine if he is mechanically responsive at that time         Impairments: abnormal muscle firing, abnormal muscle tone, abnormal or restricted ROM, abnormal movement, activity intolerance, impaired physical strength, pain with function and poor posture Symptom irritability: highUnderstanding of Dx/Px/POC: good   Prognosis: fair    Goals  Impairment based goals  Patient will demonstrate full L cervical rotation ROM  Patient will demonstrate full L cervical side bend ROM  Function based goals  Patient will be independent in comprehensive HEP upon discharge  Patient will achieve goal FOTO score upon discharge  Patient will sleep without limitation from pain  Patient will return to household chores at [facility]       Plan  Patient would benefit from: skilled physical therapy  Planned therapy interventions: activity modification, joint mobilization, manual therapy, motor coordination training, neuromuscular re-education, patient education, self care, therapeutic activities, therapeutic exercise, graded activity, home exercise program, behavior modification, graded exercise, functional ROM exercises and strengthening  Frequency: 2x week  Duration in weeks: 4  Treatment plan discussed with: patient        Subjective Evaluation    History of Present Illness  Mechanism of injury: Subjective  Symptom Location: Initially L periscapular; progressed to L lateral shoulder, L lateral forearm and digits 2,3,4  Symptom Duration: 4 months  Symptoms are: Limited L cervical rotation, L periscapular pain; lateral arm/forearm and finger numbness/pain  Commenced as a result of: no mechanism of injury  Symptoms at onset: L periscapular pain  Constant Symptoms: none  Intermittent Symptoms: all  Symptoms Worse with: bending, sitting, turning (L), during day/pm, on the move  Symptom Better with: standing, am, when still  Pain Levels: 6/10 (current); 8/10 (worst); 6/10 (best)  Medications: none  Disturbed sleep: yes  Previous Spinal History: low back pain  Previous Treatments: self massage; OTC medications  Patient has concurrent symptoms of: Dizziness (related to episode of vertigo that he has had previously)  Recent/Relevant Surgery: none  History of Cancer: none  Unexplained weight loss: none  History of Trauma: none  Work Demands: sitting, computer work  Functional Limitations: yard work; L cervical rotation; sitting tolerance  Patient goals/expectations: alleviate pain            Objective     General Comments:      Cervical/Thoracic Comments  Objective  Postural Observation   Sitting: forward head posture  Protruded Head: slight  Lateral Deviation: none  Change of Posture: increase distal symptoms  Lateral Deviation Relevant: no    Myotomes  Diminished L thoughout    Dermatomes  Diminished L C5    Reflexes  R Biceps: 2+  L Biceps: 2+  R Brachioradialis: 2+  L Brachioradialis: 2+    Neurodynamic Testing  Median Nerve: pos R  Ulnar Nerve: not tested  Radial Nerve: not tested    Cervical Active Range of Motion  Movement Loss Klaus Mod Min Nil Symptoms  Protrusion   x   pain  Flexion    x   pain  Retraction  X    Pain     Extension  X    pain     R Lat Flex    x   L Lat Flex  X    pain     R Rotation    x   L Rotation  x    pain    Kirt Assessment  Sitting:  Rep PRO: Decrease; no better (loss of L SB and L ROT ROM)  Rep RET: Increase; worse  Rep RET EXT: Not tested  Rep L LF: Decrease; no better    Lying  Rep RET: Increase; worse  Rep RET EXT: Not tested  Traction Rep RET: increase; worse      Special Tests  Sharp Sin= (neg), Alar Ligament= (neg), Flexion-Rotation Test= (unable to tolerate test position), Compression= (pos), Spurlings= (pos), Distraction= (no decrease), VBI test (neg)               Precautions: none      Manuals 6/15        Cervical distraction TB                                   Neuro Re-Ed                                                               Education Discussion about POC        Ther Ex                                                                                 Ther Activity                           Gait Training                           Modalities

## 2023-07-17 ENCOUNTER — TELEPHONE (OUTPATIENT)
Dept: INTERNAL MEDICINE CLINIC | Facility: OTHER | Age: 58
End: 2023-07-17

## 2023-07-17 DIAGNOSIS — G54.2 CERVICAL NEUROPATHY: Primary | ICD-10-CM

## 2023-07-17 NOTE — TELEPHONE ENCOUNTER
Patient calling because he went to PT like you recommended for a pinched nerve between discs in his back, which radiates down his arm and into his fingertips. PT told him that this is not an issue that they can help him with. Patient is asking if a referral can be placed for a chiropractor? He stated that the PT doctor was going to reach out to you and collaborate with his care, but that was over a month ago and he hasn't heard anything back.

## 2023-07-21 ENCOUNTER — OFFICE VISIT (OUTPATIENT)
Age: 58
End: 2023-07-21
Payer: COMMERCIAL

## 2023-07-21 VITALS — DIASTOLIC BLOOD PRESSURE: 86 MMHG | HEART RATE: 70 BPM | SYSTOLIC BLOOD PRESSURE: 142 MMHG

## 2023-07-21 DIAGNOSIS — M54.12 CERVICAL RADICULOPATHY: Primary | ICD-10-CM

## 2023-07-21 DIAGNOSIS — M62.838 MUSCLE SPASM: ICD-10-CM

## 2023-07-21 DIAGNOSIS — M99.02 SEGMENTAL DYSFUNCTION OF THORACIC REGION: ICD-10-CM

## 2023-07-21 PROCEDURE — 99203 OFFICE O/P NEW LOW 30 MIN: CPT | Performed by: CHIROPRACTOR

## 2023-07-21 PROCEDURE — 98940 CHIROPRACT MANJ 1-2 REGIONS: CPT | Performed by: CHIROPRACTOR

## 2023-07-21 PROCEDURE — 97110 THERAPEUTIC EXERCISES: CPT | Performed by: CHIROPRACTOR

## 2023-07-21 NOTE — PROGRESS NOTES
Initial date of service: 7/21/23    Diagnoses and all orders for this visit:    Cervical radiculopathy - Left    Segmental dysfunction of thoracic region    Muscle spasm      Pt's symptoms and exam findings consistent with neck/ubp and clinical C7 radiculopathy (mild neuro deficit of reduced tricep reflex and mildly diminished pinprick L C7 dermatome) secondary to repetitive st/sp type injury or possible disc-osteophyte stenosis; exacerbated by postural/ergonomic stressors. Pt responded well to traction, stretches and manual mobilization of the affected spinal and myofascial jt dysfunction, with increased ROM; trial of conservative tx recommended consisting of stretching, ther-ex, graded mobilization/manipulation of the affected spinal/myofascial tissues, postural/ergonomic education, and take home stretches/exercises. If symptoms fail to improve with short trial of conservative care, appropriate imaging and referral will be coordinated. Spent greater than 30 min c pt discussing hx, pe, ddx, tx options and reviewing notes/imaging    TREATMENT:  Fear avoidance behavior discussion, encouraged and reassured pt that natural course of condition is to improve over time with adherence to tx plan and home care strategies. Home care recommendations: avoid bed rest, walk (but avoid trails and uneven surfaces), gradual return to activity to tolerance (avoid anything that peripheralizes symptoms), ice 20 min on/off, watch for ice burn, call if symptoms peripheralize, worsen, or neurologic deficit progresses. Ther-ex: IASTM - discussed post procedure soreness and/or ecchymosis for up to 36 hrs, applied to affected mm hypertonicities; wall bart, axial retraction, upper trap stretch, lev scap stretch, SCM stretch, lat rows with t-band, lat pull down with t-band, abdominal bracing; greater than 15 min spent performing above mentioned ther-ex to improve ROM/flexibility.  Thoracic mobilization/manipulation: prone P-A mob, supine A-P manip; cervical mobilization/manipulation: traction, diversified supine graded mobilization, cervical traction    HPI:  Jorden Gonzalez is a 62 y.o. male   Chief Complaint   Patient presents with   • Neck Pain     Pain: 4/10     Pt presents for neck pain and LUE pain/paresthesia onset early 2023. Pt reports pain started in L periscap region but the progressed into LUE. Pt underwent PT eval who recommended management with medication but never followed up with PCP; PCP referred here. Pt had c/s imaging 2021 that demonstrated mild DJD/DDD with mild foraminal stenosis. Pt sits at computer most of day; relief from placing L hand on head    Neck Pain   This is a new problem. The current episode started more than 1 month ago. The problem occurs constantly. The problem has been waxing and waning. The pain is present in the left side and midline (L periscap, LUE into 2nd/3rd digit). The quality of the pain is described as aching and burning. Pain scale: 4-9/10. The symptoms are aggravated by stress and position (leaning on left elbow, prolonged sitting; palliative includes massage). Past Medical History:   Diagnosis Date   • Colon polyp    • COVID-19 11/28/2020   • Hypercholesteremia    • Hypertension       The following portions of the patient's history were reviewed and updated as appropriate: allergies, past family history, past medical history, past social history, past surgical history, and problem list.  Review of Systems   Musculoskeletal: Positive for neck pain. Physical Exam  Eyes:      Extraocular Movements: Extraocular movements intact. Neck:        Comments: Pnful and limited in Ext, Fl, LLf, Lrot  Cardiovascular:      Pulses: Normal pulses. Musculoskeletal:      Cervical back: Pain with movement and muscular tenderness present. Decreased range of motion. Thoracic back: Spasms and tenderness present. Decreased range of motion. Lymphadenopathy:      Cervical: No cervical adenopathy. Neurological:      Mental Status: He is alert and oriented to person, place, and time. Cranial Nerves: Cranial nerves 2-12 are intact. Sensory: Sensory deficit (L tricep reflex and diminished sensory deficit L C7) present. Motor: Motor function is intact. Coordination: Coordination is intact. Gait: Gait is intact. Gait and tandem walk normal.      Deep Tendon Reflexes: Reflexes normal. Babinski sign absent on the right side. Babinski sign absent on the left side. Reflex Scores:       Tricep reflexes are 1+ on the right side and 0 on the left side. Bicep reflexes are 1+ on the right side and 1+ on the left side. Brachioradialis reflexes are 1+ on the right side and 1+ on the left side. Psychiatric:         Mood and Affect: Mood and affect normal.       SOFT TISSUE ASSESSMENT: Hypertonicity and tenderness palpated C5-T6 erector spinae, L upper traps, L lev scap, L SCM, L scalene, L rhomboid, suboccipitals JOINT RECTRICTIONS: C5-T6 ORTHO: Yeni fl, ext, llf peripheralies, Rlf, Rrot palliative; max foraminal comp elicits local np L; shoulder depression elicits stiffness in L upper trap; brachial plexus tension test elicits no neural tension in R/L UE; cervical distraction elicits relieves CC; dayanara's positive on L    Return in about 4 days (around 7/25/2023) for Next scheduled follow up.

## 2023-07-21 NOTE — Clinical Note
Tobin Milton did well with a focus on myofascial work; we spoke about an oral steroid taper to reduce inflammatory component of radiculopathy, which I encouraged him to inquire with you regarding; will keep you updated on progress, thanks!

## 2023-08-04 ENCOUNTER — PROCEDURE VISIT (OUTPATIENT)
Age: 58
End: 2023-08-04
Payer: COMMERCIAL

## 2023-08-04 VITALS
HEIGHT: 70 IN | SYSTOLIC BLOOD PRESSURE: 133 MMHG | DIASTOLIC BLOOD PRESSURE: 73 MMHG | BODY MASS INDEX: 30.35 KG/M2 | WEIGHT: 212 LBS | HEART RATE: 65 BPM

## 2023-08-04 DIAGNOSIS — M54.12 CERVICAL RADICULOPATHY: Primary | ICD-10-CM

## 2023-08-04 DIAGNOSIS — M99.02 SEGMENTAL DYSFUNCTION OF THORACIC REGION: ICD-10-CM

## 2023-08-04 DIAGNOSIS — M62.838 MUSCLE SPASM: ICD-10-CM

## 2023-08-04 PROCEDURE — 98941 CHIROPRACT MANJ 3-4 REGIONS: CPT | Performed by: CHIROPRACTOR

## 2023-08-04 PROCEDURE — 97110 THERAPEUTIC EXERCISES: CPT | Performed by: CHIROPRACTOR

## 2023-08-04 NOTE — Clinical Note
Chetan Soares inquired about oral steroid taper; was encouraged to contact you to discuss, otherwise, I could refer him to [facility]

## 2023-08-04 NOTE — PROGRESS NOTES
Initial date of service: 7/21/23    Diagnoses and all orders for this visit:    Cervical radiculopathy - Left    Segmental dysfunction of thoracic region    Muscle spasm      Pt responded to tx with reduced pain and increased ROM, however relief temporary. Will reach out to PCP to help coordinate oral steroid taper to reduce inflammatory component of radiculopathy. Pt encouraged to ice more as well    TREATMENT:  Ther-ex: IASTM - discussed post procedure soreness and/or ecchymosis for up to 36 hrs, applied to affected mm hypertonicities; wall bart, axial retraction, upper trap stretch, lev scap stretch, SCM stretch, lat rows with t-band, lat pull down with t-band, abdominal bracing; greater than 15 min spent performing above mentioned ther-ex to improve ROM/flexibility. Thoracic mobilization/manipulation: prone P-A mob, supine A-P manip; cervical mobilization/manipulation: traction, diversified supine graded mobilization, cervical traction    HPI:  Ana Patel is a 62 y.o. male   Chief Complaint   Patient presents with   • Neck Pain     Patient has not had any improvements from his last visit   pain6     Pt presents for neck pain and LUE pain/paresthesia onset early 2023. Pt reports pain started in L periscap region but the progressed into LUE. Pt underwent PT eval who recommended management with medication but never followed up with PCP; PCP referred here. Pt had c/s imaging 2021 that demonstrated mild DJD/DDD with mild foraminal stenosis. Pt sits at computer most of day; relief from placing L hand on head  8/4: Pt reports improvement post tx last visit but pain levels returned; ice helping but has only been doing once a day    Neck Pain   This is a new problem. The current episode started more than 1 month ago. The problem occurs constantly. The problem has been waxing and waning. The pain is present in the left side and midline (L periscap, LUE into 2nd/3rd digit).  The quality of the pain is described as aching and burning. Pain scale: 4-9/10. The symptoms are aggravated by stress and position (leaning on left elbow, prolonged sitting; palliative includes massage). Past Medical History:   Diagnosis Date   • Colon polyp    • COVID-19 11/28/2020   • Hypercholesteremia    • Hypertension       The following portions of the patient's history were reviewed and updated as appropriate: allergies, past family history, past medical history, past social history, past surgical history, and problem list.  Review of Systems   Musculoskeletal: Positive for neck pain. Physical Exam  Eyes:      Extraocular Movements: Extraocular movements intact. Neck:        Comments: Pnful and limited in Ext, Fl, LLf, Lrot  Cardiovascular:      Pulses: Normal pulses. Musculoskeletal:      Cervical back: Pain with movement and muscular tenderness present. Decreased range of motion. Thoracic back: Spasms and tenderness present. Decreased range of motion. Lymphadenopathy:      Cervical: No cervical adenopathy. Neurological:      Mental Status: He is alert and oriented to person, place, and time. Cranial Nerves: Cranial nerves 2-12 are intact. Sensory: Sensory deficit (L tricep reflex and diminished sensory deficit L C7) present. Motor: Motor function is intact. Coordination: Coordination is intact. Gait: Gait is intact. Gait and tandem walk normal.      Deep Tendon Reflexes: Reflexes normal. Babinski sign absent on the right side. Babinski sign absent on the left side. Reflex Scores:       Tricep reflexes are 1+ on the right side and 0 on the left side. Bicep reflexes are 1+ on the right side and 1+ on the left side. Brachioradialis reflexes are 1+ on the right side and 1+ on the left side.   Psychiatric:         Mood and Affect: Mood and affect normal.       SOFT TISSUE ASSESSMENT: Hypertonicity and tenderness palpated C5-T6 erector spinae, L upper traps, L lev scap, L SCM, L scalene, L rhomboid, suboccipitals JOINT RECTRICTIONS: C5-T6     Return in about 1 week (around 8/11/2023) for Next scheduled follow up.

## 2023-08-07 DIAGNOSIS — M54.12 CERVICAL RADICULOPATHY: Primary | ICD-10-CM

## 2023-08-07 NOTE — PROGRESS NOTES
PCP would prefer [facility] to coordinate oral steroid taper with possible progression to injections depending on response to tx and imaging

## 2023-08-21 DIAGNOSIS — E78.2 MIXED HYPERLIPIDEMIA: ICD-10-CM

## 2023-08-21 RX ORDER — ATORVASTATIN CALCIUM 40 MG/1
40 TABLET, FILM COATED ORAL DAILY
Qty: 90 TABLET | Refills: 1 | Status: SHIPPED | OUTPATIENT
Start: 2023-08-21

## 2023-08-24 ENCOUNTER — TELEPHONE (OUTPATIENT)
Dept: SLEEP CENTER | Facility: CLINIC | Age: 58
End: 2023-08-24

## 2023-08-24 DIAGNOSIS — F51.04 PSYCHOPHYSIOLOGICAL INSOMNIA: ICD-10-CM

## 2023-08-24 NOTE — TELEPHONE ENCOUNTER
Patient called requesting refill of zolpidem (AMBIEN CR) 12.5 MG CR tablet   Advised him he will need also need to schedule compliance appointment with a new sleep physician or call [facility] to make appointment with Dr. Ruben Berger.  Patient elected to contact [address] [place] [address] for appointment and patient will ask PCP for bridge of medication until seen by Dr. Ruben Berger at [address] [place] [address]

## 2023-08-25 ENCOUNTER — TELEPHONE (OUTPATIENT)
Dept: SLEEP CENTER | Facility: CLINIC | Age: 58
End: 2023-08-25

## 2023-08-25 RX ORDER — ZOLPIDEM TARTRATE 12.5 MG/1
12.5 TABLET, FILM COATED, EXTENDED RELEASE ORAL
Qty: 30 TABLET | Refills: 0 | Status: SHIPPED | OUTPATIENT
Start: 2023-08-25 | End: 2023-08-28

## 2023-08-25 NOTE — TELEPHONE ENCOUNTER
Patient states that [facility] does not take his insurance.  Scheduled the patient to see Dr. Anastasiia Diop 8/28/2023

## 2023-08-28 ENCOUNTER — TELEPHONE (OUTPATIENT)
Dept: PSYCHIATRY | Facility: CLINIC | Age: 58
End: 2023-08-28

## 2023-08-28 ENCOUNTER — OFFICE VISIT (OUTPATIENT)
Dept: SLEEP CENTER | Facility: CLINIC | Age: 58
End: 2023-08-28
Payer: COMMERCIAL

## 2023-08-28 VITALS
RESPIRATION RATE: 15 BRPM | SYSTOLIC BLOOD PRESSURE: 130 MMHG | DIASTOLIC BLOOD PRESSURE: 88 MMHG | HEIGHT: 70 IN | HEART RATE: 72 BPM | BODY MASS INDEX: 30.35 KG/M2 | OXYGEN SATURATION: 98 % | WEIGHT: 212 LBS

## 2023-08-28 DIAGNOSIS — F51.04 PSYCHOPHYSIOLOGICAL INSOMNIA: ICD-10-CM

## 2023-08-28 DIAGNOSIS — G54.2 CERVICAL NEUROPATHY: ICD-10-CM

## 2023-08-28 DIAGNOSIS — G47.33 OSA (OBSTRUCTIVE SLEEP APNEA): Primary | ICD-10-CM

## 2023-08-28 PROCEDURE — 99214 OFFICE O/P EST MOD 30 MIN: CPT | Performed by: INTERNAL MEDICINE

## 2023-08-28 RX ORDER — GABAPENTIN 300 MG/1
300 CAPSULE ORAL
Qty: 30 CAPSULE | Refills: 0 | Status: SHIPPED | OUTPATIENT
Start: 2023-08-28

## 2023-08-28 RX ORDER — ZOLPIDEM TARTRATE 6.25 MG/1
6.25 TABLET, FILM COATED, EXTENDED RELEASE ORAL
Qty: 30 TABLET | Refills: 0 | Status: SHIPPED | OUTPATIENT
Start: 2023-08-28

## 2023-08-28 NOTE — PROGRESS NOTES
Pulmonary/Sleep Follow Up Note   Colton Delgado 62 y.o. male MRN: [id number]  8/28/2023      Assessment and Plan:    1. HORTENSIA (obstructive sleep apnea)  -     PAP DME Resupply/Reorder    2. Psychophysiological insomnia  -     zolpidem (AMBIEN CR) 6.25 MG CR tablet; Take 1 tablet (6.25 mg total) by mouth daily at bedtime as needed for sleep  -  Start Cognitive Behavioral Therapy    3. Cervical neuropathy  -     gabapentin (Neurontin) 300 mg capsule; Take 1 capsule (300 mg total) by mouth daily at bedtime      History of Present Illness   HPI:  Colton Delgado is a 62 y.o. male with PMHx HORTENSIA who presents for CPAP compliance visit and insomnia. Patient reports nightly usage of CPAP, with one awakening per night to urinate. He has occasional dry mouth and dry nostrils, otherwise he no complaints with his machine. He takes 1 hour to fall asleep each night due to racing thoughts. He has depression treated with sertraline, which has improved his daytime depression symptoms. However, he continues to have nighttime anxiety. He has been taking Zolpidem since 2018, and the dose has been increased to 12.5mg since February. The Zolpidem does not help him fall asleep, but it helps to keep him asleep throughout the night. Additionally, he has recently been diagnosed with a C7 radiculopathy on the left side. The pain radiates from his neck to his fingers as he is trying to fall asleep each night, which makes it more difficult to sleep. He is currently seeing a chiropractor and physical therapist for his symptoms. Review of Systems   Constitutional: Negative for activity change and appetite change. HENT: Negative for congestion and sore throat. Eyes: Negative for pain and visual disturbance. Respiratory: Negative for apnea, cough and shortness of breath. Cardiovascular: Negative for chest pain and leg swelling. Gastrointestinal: Negative for abdominal pain and nausea.    Genitourinary: Negative for difficulty urinating and dysuria. Neurological: Positive for numbness. Negative for dizziness and headaches. "pins and needles" in left thumb and index finger       Historical Information   Past Medical History:   Diagnosis Date   • Colon polyp    • COVID-19 11/28/2020   • Hypercholesteremia    • Hypertension      Past Surgical History:   Procedure Laterality Date   • COLONOSCOPY     • [place] COLONOSCOPY FLX DX W/COLLJ SPEC WHEN PFRMD N/A 5/9/2016    Procedure: COLONOSCOPY;  Surgeon: Marcelo Narayanan MD;  Location: [facility]; Service: Gastroenterology   • [place] ESOPHAGOGASTRODUODENOSCOPY TRANSORAL DIAGNOSTIC N/A 5/9/2016    Procedure: ESOPHAGOGASTRODUODENOSCOPY (EGD); Surgeon: Marcelo Narayanan MD;  Location: [facility]; Service: Gastroenterology   • SKIN BIOPSY      Each additional lesion   • UPPER GASTROINTESTINAL ENDOSCOPY     • WISDOM TOOTH EXTRACTION       Family History   Problem Relation Age of Onset   • Hypertension Mother    • Leukemia Mother    • Lung cancer Father    • Hypertension Brother    • Leukemia Family    • Leukemia Family          Meds/Allergies     Current Outpatient Medications:   •  atorvastatin (LIPITOR) 40 mg tablet, Take 1 tablet (40 mg total) by mouth daily, Disp: 90 tablet, Rfl: 1  •  FIBER ADULT GUMMIES PO, Take by mouth 5 grams daily, Disp: , Rfl:   •  gabapentin (Neurontin) 300 mg capsule, Take 1 capsule (300 mg total) by mouth daily at bedtime, Disp: 30 capsule, Rfl: 0  •  losartan (COZAAR) 100 MG tablet, Take 1 tablet (100 mg total) by mouth daily, Disp: 90 tablet, Rfl: 1  •  multivitamin (THERAGRAN) TABS, Take 1 tablet by mouth daily, Disp: , Rfl:   •  sertraline (Zoloft) 100 mg tablet, Take 1 tablet (100 mg total) by mouth daily, Disp: 90 tablet, Rfl: 1  •  zolpidem (AMBIEN CR) 6.25 MG CR tablet, Take 1 tablet (6.25 mg total) by mouth daily at bedtime as needed for sleep, Disp: 30 tablet, Rfl: 0  •  traZODone (DESYREL) 50 mg tablet, TAKE 1 TO 3 TABLETS BY MOUTH AT BEDTIME. . (Patient not taking: Reported on 3/28/2023), Disp: 270 tablet, Rfl: 2  No Known Allergies    Vitals: Blood pressure 130/88, pulse 72, resp. rate 15, height 5' 10" (1.778 m), weight 96.2 kg (212 lb), SpO2 98 %. Body mass index is 30.42 kg/m². Oxygen Therapy  SpO2: 98 %      Physical Exam  General: Awake alert and oriented x 3, conversant without conversational dyspnea, NAD, normal affect  HEENT:   Sclera noninjected, nonicteric OU, Nares patent, no craniofacial abnormalities, Mucous membranes, moist, no oral lesions, normal dentition, Mallampati class 3  NECK: Trachea midline, no accessory muscle use, no stridor  CARDIAC: Reg, single s1/S2, no m/r/g  PULM: CTA bilaterally no wheezing, rhonchi or rales  ABD: Soft nontender, nondistended, no rebound, no rigidity, no guarding  EXT: No cyanosis, no clubbing, no edema, normal capillary refill  NEURO: no focal neurologic deficits, AAOx3, moving all extremities appropriately      Compliance report: I have personally reviewed pertinent reports. Type of CPAP:  DreamStation 2 Auto CPAP Advanced                                   Percent usage: 100%                                   Average time used: 10hrs 55min                                  Time in large leak: 22 secs                                   Residual AHI: 2.7      Nito Grijalva MD   PGY-1 Transitional Year Resident  [address] [place] [address]    Portions of the record may have been created with voice recognition software. Occasional wrong word or "sound a like" substitutions may have occurred due to the inherent limitations of voice recognition software. Read the chart carefully and recognize, using context, where substitutions have occurred.

## 2023-08-28 NOTE — TELEPHONE ENCOUNTER
Writer CHASE requesting patient return call to the office regarding a referral for sleep therapy. Transfer call to . Thank you.

## 2023-08-29 ENCOUNTER — TELEPHONE (OUTPATIENT)
Dept: SLEEP CENTER | Facility: CLINIC | Age: 58
End: 2023-08-29

## 2023-08-29 ENCOUNTER — OFFICE VISIT (OUTPATIENT)
Dept: INTERNAL MEDICINE CLINIC | Facility: OTHER | Age: 58
End: 2023-08-29
Payer: COMMERCIAL

## 2023-08-29 VITALS
HEIGHT: 70 IN | OXYGEN SATURATION: 98 % | BODY MASS INDEX: 30.35 KG/M2 | WEIGHT: 212 LBS | DIASTOLIC BLOOD PRESSURE: 82 MMHG | SYSTOLIC BLOOD PRESSURE: 122 MMHG | HEART RATE: 82 BPM | TEMPERATURE: 98 F

## 2023-08-29 DIAGNOSIS — F41.9 ANXIETY: ICD-10-CM

## 2023-08-29 DIAGNOSIS — E78.2 MIXED HYPERLIPIDEMIA: ICD-10-CM

## 2023-08-29 DIAGNOSIS — R73.9 HYPERGLYCEMIA: ICD-10-CM

## 2023-08-29 DIAGNOSIS — Z12.5 PROSTATE CANCER SCREENING: ICD-10-CM

## 2023-08-29 DIAGNOSIS — I10 HYPERTENSION, BENIGN: ICD-10-CM

## 2023-08-29 DIAGNOSIS — F33.2 SEVERE EPISODE OF RECURRENT MAJOR DEPRESSIVE DISORDER, WITHOUT PSYCHOTIC FEATURES (HCC): ICD-10-CM

## 2023-08-29 DIAGNOSIS — G47.33 OSA (OBSTRUCTIVE SLEEP APNEA): Primary | ICD-10-CM

## 2023-08-29 PROCEDURE — 99214 OFFICE O/P EST MOD 30 MIN: CPT | Performed by: INTERNAL MEDICINE

## 2023-08-29 NOTE — ASSESSMENT & PLAN NOTE
Presently under the care of chiropractor.   Also started on gabapentin recently did feel some improvement

## 2023-08-29 NOTE — ASSESSMENT & PLAN NOTE
Continue with atorvastatin 40 mg daily. Continue with low-fat diet.   He is due for lipid profile before next visit

## 2023-08-29 NOTE — PROGRESS NOTES
Assessment/Plan:    HORTENSIA (obstructive sleep apnea)  Well compliant with CPAP machine. Followed by sleep medicine specialist    Hypertension, benign  Blood pressure well controlled on present regimen. Continue with low-salt diet    Cervical neuropathy  Presently under the care of chiropractor. Also started on gabapentin recently did feel some improvement    Severe episode of recurrent major depressive disorder, without psychotic features ([address])  Feeling much better on present dose of sertraline. Continue with healthy lifestyle and exercise    Hyperlipidemia  Continue with atorvastatin 40 mg daily. Continue with low-fat diet. He is due for lipid profile before next visit    Hyperglycemia  Continue with low sugar/low-carb diet. We will check hemoglobin A1c before next visit       Diagnoses and all orders for this visit:    HORTENSIA (obstructive sleep apnea)    Hypertension, benign  -     CBC; Future    Anxiety    Severe episode of recurrent major depressive disorder, without psychotic features ([address])    Mixed hyperlipidemia  -     Lipid Panel with Direct LDL reflex; Future  -     Comprehensive metabolic panel; Future    Hyperglycemia  -     Hemoglobin A1C; Future    Prostate cancer screening  -     PSA, Total Screen; Future               Subjective:          Patient ID: Trenna Bosworth is a 62 y.o. male. Patient is here for follow-up. No blood work prior to this visit. Still with cervical spine pain with upper extremity radiculopathy. Presently under the care of chiropractor. The following portions of the patient's history were reviewed and updated as appropriate: allergies, current medications, past family history, past medical history, past social history, past surgical history and problem list.    Review of Systems   Constitutional: Negative for fatigue and fever. HENT: Negative for congestion, ear discharge, ear pain, postnasal drip, sinus pressure, sore throat, tinnitus and trouble swallowing.     Eyes: Negative for discharge, itching and visual disturbance. Respiratory: Negative for cough and shortness of breath. Cardiovascular: Negative for chest pain and palpitations. Gastrointestinal: Negative for abdominal pain, diarrhea, nausea and vomiting. Endocrine: Negative for cold intolerance and polyuria. Genitourinary: Negative for difficulty urinating, dysuria and urgency. Musculoskeletal: Negative for arthralgias and neck pain. Skin: Negative for rash. Allergic/Immunologic: Negative for environmental allergies. Neurological: Negative for dizziness, weakness and headaches. Psychiatric/Behavioral: Negative for agitation and behavioral problems. The patient is not nervous/anxious. Past Medical History:   Diagnosis Date   • Colon polyp    • COVID-19 11/28/2020   • Hypercholesteremia    • Hypertension          Current Outpatient Medications:   •  atorvastatin (LIPITOR) 40 mg tablet, Take 1 tablet (40 mg total) by mouth daily, Disp: 90 tablet, Rfl: 1  •  FIBER ADULT GUMMIES PO, Take by mouth 5 grams daily, Disp: , Rfl:   •  gabapentin (Neurontin) 300 mg capsule, Take 1 capsule (300 mg total) by mouth daily at bedtime, Disp: 30 capsule, Rfl: 0  •  losartan (COZAAR) 100 MG tablet, Take 1 tablet (100 mg total) by mouth daily, Disp: 90 tablet, Rfl: 1  •  multivitamin (THERAGRAN) TABS, Take 1 tablet by mouth daily, Disp: , Rfl:   •  sertraline (Zoloft) 100 mg tablet, Take 1 tablet (100 mg total) by mouth daily, Disp: 90 tablet, Rfl: 1  •  zolpidem (AMBIEN CR) 6.25 MG CR tablet, Take 1 tablet (6.25 mg total) by mouth daily at bedtime as needed for sleep, Disp: 30 tablet, Rfl: 0    No Known Allergies    Social History   Past Surgical History:   Procedure Laterality Date   • COLONOSCOPY     • [place] COLONOSCOPY FLX DX W/COLLJ SPEC WHEN PFRMD N/A 5/9/2016    Procedure: COLONOSCOPY;  Surgeon: Sandra Cheadle, MD;  Location: [facility];   Service: Gastroenterology   • [place] ESOPHAGOGASTRODUODENOSCOPY TRANSORAL DIAGNOSTIC N/A 5/9/2016    Procedure: ESOPHAGOGASTRODUODENOSCOPY (EGD); Surgeon: Chencho Pacheco MD;  Location: [facility]; Service: Gastroenterology   • SKIN BIOPSY      Each additional lesion   • UPPER GASTROINTESTINAL ENDOSCOPY     • WISDOM TOOTH EXTRACTION       Family History   Problem Relation Age of Onset   • Hypertension Mother    • Leukemia Mother    • Lung cancer Father    • Hypertension Brother    • Leukemia Family    • Leukemia Family        Objective:  /82 (BP Location: Left arm, Patient Position: Sitting, Cuff Size: Adult)   Pulse 82   Temp 98 °F (36.7 °C)   Ht 5' 10" (1.778 m)   Wt 96.2 kg (212 lb)   SpO2 98%   BMI 30.42 kg/m²   Body mass index is 30.42 kg/m². Physical Exam  Constitutional:       Appearance: He is well-developed. He is not ill-appearing or diaphoretic. HENT:      Head: Normocephalic. Right Ear: External ear normal. There is no impacted cerumen. Left Ear: External ear normal. There is no impacted cerumen. Nose: No rhinorrhea. Mouth/Throat:      Pharynx: No posterior oropharyngeal erythema. Eyes:      General: No scleral icterus. Pupils: Pupils are equal, round, and reactive to light. Neck:      Thyroid: No thyromegaly. Trachea: No tracheal deviation. Cardiovascular:      Rate and Rhythm: Normal rate and regular rhythm. Heart sounds: Normal heart sounds. Pulmonary:      Effort: Pulmonary effort is normal. No respiratory distress. Breath sounds: Normal breath sounds. Chest:      Chest wall: No tenderness. Abdominal:      General: Bowel sounds are normal.      Palpations: Abdomen is soft. There is no mass. Tenderness: There is no abdominal tenderness. Musculoskeletal:         General: Normal range of motion. Cervical back: Normal range of motion and neck supple. Right lower leg: No edema. Left lower leg: No edema. Lymphadenopathy:      Cervical: No cervical adenopathy.    Skin:     General: Skin is warm.   Neurological:      Mental Status: He is alert and oriented to person, place, and time. Cranial Nerves: No cranial nerve deficit.    Psychiatric:         Mood and Affect: Mood normal.         Behavior: Behavior normal.

## 2023-08-30 ENCOUNTER — PROCEDURE VISIT (OUTPATIENT)
Age: 58
End: 2023-08-30
Payer: COMMERCIAL

## 2023-08-30 VITALS
HEIGHT: 70 IN | DIASTOLIC BLOOD PRESSURE: 80 MMHG | WEIGHT: 212 LBS | BODY MASS INDEX: 30.35 KG/M2 | HEART RATE: 81 BPM | SYSTOLIC BLOOD PRESSURE: 135 MMHG

## 2023-08-30 DIAGNOSIS — M99.02 SEGMENTAL DYSFUNCTION OF THORACIC REGION: ICD-10-CM

## 2023-08-30 DIAGNOSIS — M62.838 MUSCLE SPASM: ICD-10-CM

## 2023-08-30 DIAGNOSIS — M54.12 CERVICAL RADICULOPATHY: Primary | ICD-10-CM

## 2023-08-30 LAB

## 2023-08-30 PROCEDURE — 97110 THERAPEUTIC EXERCISES: CPT | Performed by: CHIROPRACTOR

## 2023-08-30 PROCEDURE — 98940 CHIROPRACT MANJ 1-2 REGIONS: CPT | Performed by: CHIROPRACTOR

## 2023-08-30 NOTE — TELEPHONE ENCOUNTER
Writer RENA for patient to return call regarding a referral for CBT-I therapy. Please transfer call upon return call.  Thank you

## 2023-08-30 NOTE — PROGRESS NOTES
Initial date of service: 7/21/23    Diagnoses and all orders for this visit:    Cervical radiculopathy    Segmental dysfunction of thoracic region    Muscle spasm      Pt responded to tx with reduced pain and increased ROM; gabapentin helping. We will reinitiate course of weekly tx (pt missed appts since early Aug) with MRI to follow-up if no improvement    TREATMENT:  Ther-ex: IASTM - discussed post procedure soreness and/or ecchymosis for up to 36 hrs, applied to affected mm hypertonicities; wall bart, axial retraction, upper trap stretch, lev scap stretch, SCM stretch, lat rows with t-band, lat pull down with t-band, abdominal bracing; greater than 15 min spent performing above mentioned ther-ex to improve ROM/flexibility. Thoracic mobilization/manipulation: prone P-A mob, supine A-P manip; cervical mobilization/manipulation: traction, diversified supine graded mobilization, cervical traction    HPI:  Talia Walker is a 62 y.o. male   Chief Complaint   Patient presents with   • Neck - Pain     Neck pain that radiates down left shoulder down to fingers. Patient states  "buzzing pain " then 3 fingers are numb. Pain score 7     Pt presents for neck pain and LUE pain/paresthesia onset early 2023. Pt reports pain started in L periscap region but the progressed into LUE. Pt underwent PT eval who recommended management with medication but never followed up with PCP; PCP referred here. Pt had c/s imaging 2021 that demonstrated mild DJD/DDD with mild foraminal stenosis. Pt sits at computer most of day; relief from placing L hand on head  8/30: Pt reports he was placed on gabapentin,which is helping    Neck Pain   This is a new problem. The current episode started more than 1 month ago. The problem occurs constantly. The problem has been waxing and waning. The pain is present in the left side and midline (L periscap, LUE into 2nd/3rd digit). The quality of the pain is described as aching and burning. Pain scale: 1-8/10. The symptoms are aggravated by stress and position (leaning on left elbow, prolonged sitting; palliative includes massage). Past Medical History:   Diagnosis Date   • Colon polyp    • COVID-19 11/28/2020   • Hypercholesteremia    • Hypertension       The following portions of the patient's history were reviewed and updated as appropriate: allergies, past family history, past medical history, past social history, past surgical history, and problem list.  Review of Systems   Musculoskeletal: Positive for neck pain. Physical Exam  Eyes:      Extraocular Movements: Extraocular movements intact. Neck:        Comments: Pnful and limited in Ext, Fl, LLf, Lrot  Cardiovascular:      Pulses: Normal pulses. Musculoskeletal:      Cervical back: Pain with movement and muscular tenderness present. Decreased range of motion. Thoracic back: Spasms and tenderness present. Decreased range of motion. Lymphadenopathy:      Cervical: No cervical adenopathy. Neurological:      Mental Status: He is alert and oriented to person, place, and time. Cranial Nerves: Cranial nerves 2-12 are intact. Sensory: Sensory deficit (L tricep reflex and diminished sensory deficit L C7) present. Motor: Motor function is intact. Coordination: Coordination is intact. Gait: Gait is intact. Gait and tandem walk normal.      Deep Tendon Reflexes: Reflexes normal. Babinski sign absent on the right side. Babinski sign absent on the left side. Reflex Scores:       Tricep reflexes are 1+ on the right side and 0 on the left side. Bicep reflexes are 1+ on the right side and 1+ on the left side. Brachioradialis reflexes are 1+ on the right side and 1+ on the left side.   Psychiatric:         Mood and Affect: Mood and affect normal.       SOFT TISSUE ASSESSMENT: Hypertonicity and tenderness palpated C5-T6 erector spinae, L upper traps, L lev scap, L SCM, L scalene, L rhomboid, suboccipitals JOINT RECTRICTIONS: C5-T6     Return in about 1 week (around 9/6/2023) for Next scheduled follow up.

## 2023-09-27 DIAGNOSIS — G54.2 CERVICAL NEUROPATHY: ICD-10-CM

## 2023-09-27 DIAGNOSIS — F51.04 PSYCHOPHYSIOLOGICAL INSOMNIA: ICD-10-CM

## 2023-09-27 RX ORDER — GABAPENTIN 300 MG/1
300 CAPSULE ORAL
Qty: 30 CAPSULE | Refills: 0 | Status: SHIPPED | OUTPATIENT
Start: 2023-09-27

## 2023-09-27 RX ORDER — ZOLPIDEM TARTRATE 6.25 MG/1
6.25 TABLET, FILM COATED, EXTENDED RELEASE ORAL
Qty: 30 TABLET | Refills: 0 | Status: SHIPPED | OUTPATIENT
Start: 2023-09-27

## 2023-09-27 NOTE — TELEPHONE ENCOUNTER
Patient left message on the nurse line requesting refill Rx for gabapentin-300mg capsules and zolpidem 6.25mg CR tablets. Rx to be sent to [id number] Last office visit 8/28/2023. Next office visit 10/24/2023. Dr. Sherice Rodgers, please review Rx(s) and sign if agreeable. Thank you.

## 2023-10-04 ENCOUNTER — DOCUMENTATION (OUTPATIENT)
Dept: BEHAVIORAL/MENTAL HEALTH CLINIC | Facility: CLINIC | Age: 58
End: 2023-10-04

## 2023-10-04 DIAGNOSIS — F41.9 ANXIETY: ICD-10-CM

## 2023-10-04 DIAGNOSIS — F33.2 SEVERE EPISODE OF RECURRENT MAJOR DEPRESSIVE DISORDER, WITHOUT PSYCHOTIC FEATURES (HCC): Primary | ICD-10-CM

## 2023-10-04 NOTE — PROGRESS NOTES
Psychotherapy Discharge Summary    Preferred Name: Leonel Wise  YOB: 1965    Admission date to psychotherapy: 12/9/21    Referred by: Self    Presenting Problem: Matt Falcon presented with symptoms of anxiety and depression. He reported stressors related to work and family. He had recently went out on disability due to mental stressors and having trouble completing work related tasks. He was also caring for his granddaughter while his daughter was not living with him anymore and had moved in with a new paramour. Course of treatment included : psychoeducation and individual therapy     Progress/Outcome of Treatment Goals (brief summary of course of treatment) Matt Falcon was engaged in treatment and worked on better ways to communicate with family and set boundaries. Treatment Complications (if any): loss of insurance coverage    Treatment Progress: good    Current [facility] Psychiatric Provider: n/a    Discharge Medications include: n/a    Discharge Date: 10/4/23    Discharge Diagnosis:   1. Severe episode of recurrent major depressive disorder, without psychotic features ([address])        2. Anxiety            Criteria for Discharge: change in Insurance    Aftercare recommendations include (include specific referral names and phone numbers, if appropriate): Continue therapy in the future if needed and follow up with PCP for medication.      Prognosis: good

## 2023-10-11 ENCOUNTER — TELEPHONE (OUTPATIENT)
Dept: PSYCHIATRY | Facility: CLINIC | Age: 58
End: 2023-10-11

## 2023-10-18 ENCOUNTER — TELEPHONE (OUTPATIENT)
Dept: SLEEP CENTER | Facility: CLINIC | Age: 58
End: 2023-10-18

## 2023-10-18 NOTE — TELEPHONE ENCOUNTER
Patient left message inquiring whether the [facility] [address] [facility] accepts [facility]. Returned call to patient. Left message and provided telephone number for insurance authorizations [phone].

## 2023-10-26 DIAGNOSIS — I10 ESSENTIAL HYPERTENSION, BENIGN: ICD-10-CM

## 2023-10-26 RX ORDER — LOSARTAN POTASSIUM 100 MG/1
100 TABLET ORAL DAILY
Qty: 90 TABLET | Refills: 1 | Status: SHIPPED | OUTPATIENT
Start: 2023-10-26

## 2023-10-31 DIAGNOSIS — F51.04 PSYCHOPHYSIOLOGICAL INSOMNIA: ICD-10-CM

## 2023-10-31 DIAGNOSIS — G54.2 CERVICAL NEUROPATHY: ICD-10-CM

## 2023-11-01 RX ORDER — GABAPENTIN 300 MG/1
300 CAPSULE ORAL
Qty: 30 CAPSULE | Refills: 0 | Status: SHIPPED | OUTPATIENT
Start: 2023-11-01

## 2023-11-01 RX ORDER — ZOLPIDEM TARTRATE 6.25 MG/1
6.25 TABLET, FILM COATED, EXTENDED RELEASE ORAL
Qty: 30 TABLET | Refills: 0 | Status: SHIPPED | OUTPATIENT
Start: 2023-11-01

## 2023-11-01 NOTE — TELEPHONE ENCOUNTER
Last office visit 8/28/23. Patient cancelled 6-week follow up on 10/24/23 and did not reschedule. Message sent to patient to reschedule.

## 2023-11-17 ENCOUNTER — OFFICE VISIT (OUTPATIENT)
Dept: URGENT CARE | Age: 58
End: 2023-11-17
Payer: COMMERCIAL

## 2023-11-17 VITALS
HEART RATE: 98 BPM | SYSTOLIC BLOOD PRESSURE: 148 MMHG | TEMPERATURE: 97.3 F | RESPIRATION RATE: 20 BRPM | DIASTOLIC BLOOD PRESSURE: 68 MMHG | OXYGEN SATURATION: 97 %

## 2023-11-17 DIAGNOSIS — J40 BRONCHITIS: Primary | ICD-10-CM

## 2023-11-17 LAB
SARS-COV-2 AG UPPER RESP QL IA: NEGATIVE
VALID CONTROL: NORMAL

## 2023-11-17 PROCEDURE — 87880 STREP A ASSAY W/OPTIC: CPT | Performed by: PHYSICIAN ASSISTANT

## 2023-11-17 PROCEDURE — 99214 OFFICE O/P EST MOD 30 MIN: CPT | Performed by: PHYSICIAN ASSISTANT

## 2023-11-17 PROCEDURE — 87811 SARS-COV-2 COVID19 W/OPTIC: CPT | Performed by: PHYSICIAN ASSISTANT

## 2023-11-17 RX ORDER — ALBUTEROL SULFATE 90 UG/1
2 AEROSOL, METERED RESPIRATORY (INHALATION) EVERY 6 HOURS PRN
Qty: 8.5 G | Refills: 0 | Status: SHIPPED | OUTPATIENT
Start: 2023-11-17

## 2023-11-17 RX ORDER — AZITHROMYCIN 250 MG/1
TABLET, FILM COATED ORAL
Qty: 6 TABLET | Refills: 0 | Status: SHIPPED | OUTPATIENT
Start: 2023-11-17 | End: 2023-11-21

## 2023-11-17 NOTE — LETTER
November 17, 2023     Patient: Damien Delcid   YOB: 1965   Date of Visit: 11/17/2023       To Whom It May Concern: It is my medical opinion that Poonam Melo may return to work on 11/20/2023 . If you have any questions or concerns, please don't hesitate to call.          Sincerely,        Osman Barrientos PA-C    CC: No Recipients

## 2023-11-17 NOTE — PROGRESS NOTES
[facility]        NAME: Damien Delcid is a 62 y.o. male  : 1965    MRN: [id number]  DATE: 2023  TIME: 4:25 PM    Assessment and Plan   Bronchitis [J40]  1. Bronchitis  POCT rapid strepA    Poct Covid 19 Rapid Antigen Test    azithromycin (ZITHROMAX) 250 mg tablet    albuterol (ProAir HFA) 90 mcg/act inhaler      Presents with symptoms and examination consistent with bronchitis. Recommend Z-Daniel and albuterol inhaler to treat. COVID and strep in clinic are negative. Patient Instructions     Patient Instructions   Take Z-Daniel as prescribed. Use albuterol inhaler as directed as needed. Continue Mucinex as needed. Follow-up with PCP in 2 to 3 days if symptoms or not improved. If symptoms worsen or new symptoms develop report to the emergency room immediately. Chief Complaint     Chief Complaint   Patient presents with    Cough    Sore Throat    Shortness of Breath    Wheezing    Headache     SORE THROAT, COUGH, HEADACHE, HEAD/CHEST CONGESTION,WHEEZING, MUCUS COLOR GREEN/GREY, AND SHORTNESS OF BREATH FOR 1 WEEK. History of Present Illness       62year old male presents with complaint of runny nose, congestion, sore throat, chest congestion, cough, chest tightness, and shortness of breath x 1 week. Pt reports cough is productive of green/grey sputum. He reports at least 2 known sick contacts (a coworker and his 11 month old grandson). Has been taking Mucinex with mild benefit. Review of Systems   Review of Systems   Constitutional:  Negative for chills, fatigue and fever. HENT:  Positive for congestion, postnasal drip, rhinorrhea and sore throat. Negative for trouble swallowing and voice change. Respiratory:  Positive for cough, chest tightness and shortness of breath. Cardiovascular:  Negative for chest pain. Gastrointestinal:  Negative for diarrhea, nausea and vomiting. Musculoskeletal:  Negative for myalgias.    Neurological:  Negative for headaches. Current Medications       Current Outpatient Medications:     albuterol (ProAir HFA) 90 mcg/act inhaler, Inhale 2 puffs every 6 (six) hours as needed for wheezing, Disp: 8.5 g, Rfl: 0    azithromycin (ZITHROMAX) 250 mg tablet, Take 2 tablets today then 1 tablet daily x 4 days, Disp: 6 tablet, Rfl: 0    atorvastatin (LIPITOR) 40 mg tablet, Take 1 tablet (40 mg total) by mouth daily, Disp: 90 tablet, Rfl: 1    FIBER ADULT GUMMIES PO, Take by mouth 5 grams daily, Disp: , Rfl:     gabapentin (Neurontin) 300 mg capsule, Take 1 capsule (300 mg total) by mouth daily at bedtime, Disp: 30 capsule, Rfl: 0    losartan (COZAAR) 100 MG tablet, Take 1 tablet (100 mg total) by mouth daily, Disp: 90 tablet, Rfl: 1    multivitamin (THERAGRAN) TABS, Take 1 tablet by mouth daily, Disp: , Rfl:     sertraline (Zoloft) 100 mg tablet, Take 1 tablet (100 mg total) by mouth daily, Disp: 90 tablet, Rfl: 1    zolpidem (AMBIEN CR) 6.25 MG CR tablet, Take 1 tablet (6.25 mg total) by mouth daily at bedtime as needed for sleep, Disp: 30 tablet, Rfl: 0    Current Allergies     Allergies as of 11/17/2023    (No Known Allergies)            The following portions of the patient's history were reviewed and updated as appropriate: allergies, current medications, past family history, past medical history, past social history, past surgical history and problem list.     Past Medical History:   Diagnosis Date    Colon polyp     COVID-19 11/28/2020    Hypercholesteremia     Hypertension        Past Surgical History:   Procedure Laterality Date    COLONOSCOPY      OH COLONOSCOPY FLX DX W/COLLJ SPEC WHEN PFRMD N/A 5/9/2016    Procedure: COLONOSCOPY;  Surgeon: Leah Funes MD;  Location: [facility]; Service: Gastroenterology    OH ESOPHAGOGASTRODUODENOSCOPY TRANSORAL DIAGNOSTIC N/A 5/9/2016    Procedure: ESOPHAGOGASTRODUODENOSCOPY (EGD); Surgeon: Leah Funes MD;  Location: [facility];   Service: Gastroenterology    SKIN BIOPSY      Each additional lesion    UPPER GASTROINTESTINAL ENDOSCOPY      WISDOM TOOTH EXTRACTION         Family History   Problem Relation Age of Onset    Hypertension Mother     Leukemia Mother     Lung cancer Father     Hypertension Brother     Leukemia Family     Leukemia Family          Medications have been verified. Objective   /68   Pulse 98   Temp (!) 97.3 °F (36.3 °C) (Temporal)   Resp 20   SpO2 97%   No LMP for male patient. Physical Exam     Physical Exam  Vitals and nursing note reviewed. Constitutional:       General: He is not in acute distress. Appearance: Normal appearance. He is not ill-appearing or toxic-appearing. HENT:      Head: Normocephalic and atraumatic. Jaw: No trismus. Right Ear: Hearing, tympanic membrane, ear canal and external ear normal. There is no impacted cerumen. No foreign body. Left Ear: Hearing, tympanic membrane, ear canal and external ear normal. There is no impacted cerumen. No foreign body. Nose: Mucosal edema, congestion and rhinorrhea present. No nasal deformity. Rhinorrhea is clear. Right Nostril: No foreign body, epistaxis or occlusion. Left Nostril: No foreign body, epistaxis or occlusion. Right Turbinates: Not enlarged, swollen or pale. Left Turbinates: Not enlarged, swollen or pale. Right Sinus: Maxillary sinus tenderness and frontal sinus tenderness present. Left Sinus: Maxillary sinus tenderness and frontal sinus tenderness present. Mouth/Throat:      Lips: Pink. No lesions. Mouth: Mucous membranes are moist. No injury, oral lesions or angioedema. Dentition: Normal dentition. Tongue: No lesions. Tongue does not deviate from midline. Palate: No mass and lesions. Pharynx: Uvula midline. No pharyngeal swelling, oropharyngeal exudate, posterior oropharyngeal erythema or uvula swelling. Tonsils: No tonsillar exudate or tonsillar abscesses.       Comments: Mild erythema and swelling of the posterior oropharynx with postnasal drip present. Eyes:      General: Lids are normal. Gaze aligned appropriately. No allergic shiner. Extraocular Movements: Extraocular movements intact. Cardiovascular:      Rate and Rhythm: Normal rate and regular rhythm. Heart sounds: Normal heart sounds, S1 normal and S2 normal. Heart sounds not distant. No murmur heard. No friction rub. No gallop. Pulmonary:      Effort: Pulmonary effort is normal.      Breath sounds: Normal breath sounds. No decreased breath sounds, wheezing, rhonchi or rales. Comments: Patient speaking in full sentences with no increased respiratory effort. No audible wheezing or stridor. Lymphadenopathy:      Cervical: Cervical adenopathy present. Right cervical: Superficial cervical adenopathy present. No deep or posterior cervical adenopathy. Left cervical: Superficial cervical adenopathy present. No deep or posterior cervical adenopathy. Skin:     General: Skin is warm and dry. Neurological:      Mental Status: He is alert and oriented to person, place, and time. Coordination: Coordination is intact. Gait: Gait is intact. Psychiatric:         Attention and Perception: Attention and perception normal.         Mood and Affect: Mood and affect normal.         Speech: Speech normal.         Behavior: Behavior is cooperative. Note: Portions of this record may have been created with voice recognition software. Occasional wrong word or "sound a like" substitutions may have occurred due to the inherent limitations of voice recognition software. Please read the chart carefully and recognize, using context, where substitutions have occurred. *

## 2023-11-17 NOTE — PATIENT INSTRUCTIONS
Take Z-Daniel as prescribed. Use albuterol inhaler as directed as needed. Continue Mucinex as needed. Follow-up with PCP in 2 to 3 days if symptoms or not improved. If symptoms worsen or new symptoms develop report to the emergency room immediately.

## 2023-11-22 DIAGNOSIS — F33.2 SEVERE EPISODE OF RECURRENT MAJOR DEPRESSIVE DISORDER, WITHOUT PSYCHOTIC FEATURES (HCC): ICD-10-CM

## 2023-11-22 DIAGNOSIS — F41.9 ANXIETY: ICD-10-CM

## 2023-11-22 RX ORDER — SERTRALINE HYDROCHLORIDE 100 MG/1
100 TABLET, FILM COATED ORAL DAILY
Qty: 90 TABLET | Refills: 1 | Status: SHIPPED | OUTPATIENT
Start: 2023-11-22

## 2023-12-11 DIAGNOSIS — G54.2 CERVICAL NEUROPATHY: ICD-10-CM

## 2023-12-11 DIAGNOSIS — F51.04 PSYCHOPHYSIOLOGICAL INSOMNIA: ICD-10-CM

## 2023-12-11 RX ORDER — ZOLPIDEM TARTRATE 6.25 MG/1
6.25 TABLET, FILM COATED, EXTENDED RELEASE ORAL
Qty: 30 TABLET | Refills: 0 | Status: SHIPPED | OUTPATIENT
Start: 2023-12-11

## 2023-12-11 RX ORDER — GABAPENTIN 300 MG/1
300 CAPSULE ORAL
Qty: 30 CAPSULE | Refills: 0 | Status: SHIPPED | OUTPATIENT
Start: 2023-12-11

## 2023-12-11 NOTE — TELEPHONE ENCOUNTER
Last office visit 8/28/2023. Next office visit 1/4/2024. Dr. Shannon Mechelle, please review Rx(s) and sign if agreeable. Thank you.

## 2023-12-18 DIAGNOSIS — I10 HYPERTENSION, BENIGN: ICD-10-CM

## 2023-12-18 DIAGNOSIS — E78.2 MIXED HYPERLIPIDEMIA: ICD-10-CM

## 2023-12-18 DIAGNOSIS — Z12.5 PROSTATE CANCER SCREENING: Primary | ICD-10-CM

## 2023-12-18 DIAGNOSIS — R73.9 HYPERGLYCEMIA: ICD-10-CM

## 2024-01-04 ENCOUNTER — OFFICE VISIT (OUTPATIENT)
Dept: SLEEP CENTER | Facility: CLINIC | Age: 59
End: 2024-01-04
Payer: COMMERCIAL

## 2024-01-04 VITALS
SYSTOLIC BLOOD PRESSURE: 118 MMHG | OXYGEN SATURATION: 96 % | WEIGHT: 215 LBS | HEART RATE: 76 BPM | HEIGHT: 70 IN | BODY MASS INDEX: 30.78 KG/M2 | DIASTOLIC BLOOD PRESSURE: 80 MMHG

## 2024-01-04 DIAGNOSIS — G47.33 OSA (OBSTRUCTIVE SLEEP APNEA): Primary | ICD-10-CM

## 2024-01-04 DIAGNOSIS — G54.2 CERVICAL NEUROPATHY: ICD-10-CM

## 2024-01-04 DIAGNOSIS — F51.04 CHRONIC INSOMNIA: ICD-10-CM

## 2024-01-04 DIAGNOSIS — F51.04 PSYCHOPHYSIOLOGICAL INSOMNIA: ICD-10-CM

## 2024-01-04 PROCEDURE — 99214 OFFICE O/P EST MOD 30 MIN: CPT | Performed by: STUDENT IN AN ORGANIZED HEALTH CARE EDUCATION/TRAINING PROGRAM

## 2024-01-04 RX ORDER — GABAPENTIN 300 MG/1
300 CAPSULE ORAL
Qty: 30 CAPSULE | Refills: 2 | Status: SHIPPED | OUTPATIENT
Start: 2024-01-04

## 2024-01-04 RX ORDER — ZOLPIDEM TARTRATE 6.25 MG/1
6.25 TABLET, FILM COATED, EXTENDED RELEASE ORAL
Qty: 30 TABLET | Refills: 2 | Status: SHIPPED | OUTPATIENT
Start: 2024-01-04

## 2024-01-04 NOTE — PATIENT INSTRUCTIONS
Continue PAP Therapy  Continue AutoPAP at current settings.  Remember to clean your mask and equipment regularly, as directed.  You should be eligible for new supplies approximately every 3-6 months, depending on your insurance coverage. Contact your Durable Medical Equipment (DME) company for new supplies as needed.  Can try Biotene mouth spray to help with dry mouth, as well as adjusting the humidity  Follow up in 6 months.      Care and Maintenance  Headgear should be washed as needed. Daily inspection and weekly washings are recommended. Do not disassemble the straps. Machine wash in warm water, making sure to attach Velcro hooks and tabs before washing. Line dry or machine dry on a low setting.  Masks should be washed every day. Daily inspection is recommended. Leave the mask and tubing attached. Gently wash the mask with a soft cloth using warm water and mild detergent, concentrating on the mask cushion flaps. DO NOT use alcohol or bleach. Rinse thoroughly and air dry.  Tubing and headgear should be washed weekly. Daily inspection is recommended. Wash in warm water and mild detergent and rinse thoroughly. Hook the tubing to the machine and blow until dry.  Humidifier should be washed daily and filled with DISTILLED water before use. Wash with warm water and mild detergent. Disinfect weekly by soaking with a solution of 1 part white vinegar and 3 parts water for 30 minutes. Rinse thoroughly and air dry.  Disposable filters should be replaced once a month. Wash reusable foam filters with warm water and mild detergent at least once a month. Rinse thoroughly and dry with paper towels.  Avoid  that contain fragrance or conditioners, as these will leave a residue.  NEVER iron any soft goods.      CMS Requirements    Your insurance requires a face-to-face follow up visit within a 31-90 day period after starting CPAP.  Your insurance requires compliance with CPAP, which is at least 4 hours per night for 70% of  "the time. This must be done over a 30 day period and must occur within the initial 31-90 day period after starting CPAP.  Your insurance also requires at least yearly follow ups to continue to pay for CPAP supplies.       PAP Supply Guidelines    Below are the guidelines for reordering your supplies. You will be responsible for your deductible, co payments, and out of pocket expenses.    Item Frequency   Nasal Mask (no headgear) 1 every 3 months   Nasal Mask Cushion 1 every 2 weeks   Full Face Mask (no headgear) 1 every 3 months   Full Face Mask Cushion 1 every month   Nasal Pillows 1 every 2 weeks   Headgear 1 every 6 months   hin Strap 1 every 6 months   keenan 1 every 3 months   Filters: Reusable 1 every 6 months   Filters: Disposable 1 every 2 weeks   Humidifier Chamber(disposable) 1 every 6 months       For Your Insomnia:    The gold standard of treatment for insomnia is cognitive behavioral therapy for insomnia (CBTI}. Medications come with side effects and once stopped, may no longer help the underlying sleep problem. Data on CBTI shows lasting effects of this intervention.     PLAN:  1. Practice good Sleep Hygiene, as outlined below.   2. I am okay with continuing the zolpidem and gabapentin for now, at current dosing  3. I am also placing a referral for Cognitive Behavioral Therapy for Insomnia (CBT-I).   4. There is also online program for CBT-I called \"GO! To Sleep\". Information is below. If you choose to do this or when you start formal CBT-I, I would recommend coming off the zolpidem to have the best effect.       Good Sleep Hygiene    Wake up at the same time every day, even on the weekends.  Use your bed for sleep and intimacy only.  If you have been in bed awake for 30 minutes, get up and leave the bedroom. Choose a dull activity not involving a blue screen (TV, computer, handheld devices). Go back to bed when you feel sleepy.  Avoid caffeine, nicotine and alcohol before you go to bed.  Avoid large " meals before you go to bed.  Avoid using screens (computers, tablets, smartphones, etc.) for at least 1 hour before bedtime  Exercise regularly, but do not exercise right before you go to bed.  Avoid daytime naps. If you do take a nap, sleep for 20-40 minutes, and not after dinner.        GO! to Sleep Instructions  Your health care provider has recommended GO! to Sleep, a six-week online program developed by experts at the [facility]. GO! to Sleep follows a similar treatment plan to those used at the [facility] and other [facility] sleep clinics.  How It Works  The program uses proven methods to help you improve your sleep from the comfort and privacy of your own home. A recent study in the journal SLEEP found that 81 percent of patients who completed a five-week online program for insomnia reported improvement in sleep.   What You Get  Starting in Lesson 1, you will begin keeping a daily online sleep log. Each day after you fill out your log, you will get a sleep score. Based on your sleep log information and sleep score, you will receive individualized feedback and daily sleep-improvement recommendations.  In addition, you will learn the basic science of sleep and why certain behaviors can be detrimental to your sleep. You will also be given activities to help you get the sleep you need, educational articles to help you get the most out of the program, motivational tips, and personalized progress charts. Plus, you will receive six specially crafted audio relaxation practices that will help you manage stress and improve your sleep.  Who It's For  GO! to Sleep was designed for those experiencing short-term insomnia (development of insomnia in the past one to six months) as well as people with ongoing difficulties with insomnia (six months or longer). If you use sleep medication on an occasional basis, the program will help you learn techniques that will likely help you sleep better  without medication.  Although GO! to Sleep was not designed for people who take prescription sleep medications on a nightly basis, you can follow the program while on sleep medication and learn tools to help you discontinue medication use. However, the program is not intended to help you implement a plan specifically to taper off your medication. If you are looking for this type of help, discuss this with your prescribing provider and/or ask for a referral to the [facility] for further assessment and an individualized treatment plan.  Additionally, if you feel that depression, anxiety or a recent traumatic event may be causing or contributing to your poor sleep, you may want to consult with a mental health professional. The [facility] offers sleep specialists who can help patients taper off medications as well as mental health experts who can help with contributing factors of insomnia. For additional information about this option, call [phone]. The program in on the URL: [email or web address] with the specific link being: [email or web address]/[email or web address]/online-programs/products/go-to-sleep-online

## 2024-01-04 NOTE — PROGRESS NOTES
[facility]  Sleep Medicine Follow up/ Established Patient Visit      Assessment/Plan:  Sachin was seen today for follow-up.    Diagnoses and all orders for this visit:    HORTENSIA (obstructive sleep apnea)  -     PAP DME Resupply/Reorder    Cervical neuropathy  -     gabapentin (Neurontin) 300 mg capsule; Take 1 capsule (300 mg total) by mouth daily at bedtime    Chronic insomnia  -     zolpidem (AMBIEN CR) 6.25 MG CR tablet; Take 1 tablet (6.25 mg total) by mouth daily at bedtime as needed for sleep    Psychophysiological insomnia  -     zolpidem (AMBIEN CR) 6.25 MG CR tablet; Take 1 tablet (6.25 mg total) by mouth daily at bedtime as needed for sleep      Sachin is a pleasant 58-year-old gentleman with a PMHx of HTN, cervical neuropathy, HLD, MDD, anxiety who presents in follow up for HORTENSIA (AHI 31.8, O2 pelon 73% 5/2018) and insomnia.  He is doing very well from an HORTENSIA standpoint, with excellent treatment response and compliance to PAP therapy, endorsing that it is very beneficial.  The gabapentin helped his neck discomfort substantially, and he endorses that the pain with numbness/tingling that traveled from his neck down his shoulder into the dorsal arm down to his first 3 digits has improved significantly as well on this medication.  As a result, between this and the Ambien, he has also noted improvement in his chronic insomnia, however it still will take him 30 to 60 minutes to fall asleep.    - Continue AutoPap at settings of 10 to 15 cm H2O  - Prescription for new supplies ordered today  - Reviewed CMS/insurance requirements and resupply guidelines  - Information provided on the above as well as general maintenance steps  - I have placed a refill on the Ambien for now, however encouraged him to continue to work on his sleep hygiene, and I will also follow-up on the referral for CBT-I that was placed at his last visit.  - In the interim, I have provided him information on the [facility] Go! to Sleep web program if he wishes to look into this.  I did encourage him that should he wish to pursue this, he should come off the Ambien for past effect.  - We discussed his neck and arm symptoms to a degree today; given the pattern of his pain/discomfort that seems to follow a dermatome, coupled with the fact that the gabapentin helped (which can help substantially with nerve pain), I do agree that an MRI of his cervical spine may very well be merited to look for a formal radiculopathy or cord compression.  I will defer ordering this to his PCP per their request (per patient)  - Discussed that he could adjust humidity settings and/or utilize Biotene mouth spray to help with overnight dry mouth with PAP therapy  - he is to see me back in ~6 months, however may reach out sooner if needed.            ________________________________________________________________________________________________    Per Last Visit Note (Date: 8/28/2023):  -Severe obstructive sleep apnea: Adherent using CPAP, encouraged to continue using the CPAP  -Chronic Insomnia: I discussed with the patient in length alternative therapies for chronic insomnia and that chronic zolpidem therapy is not favorable when it comes to side effects as well as tolerance and risk for dependence, the patient is willing to try other options, I think with the cervical radiculopathy that he has been recent diagnosed starting gabapentin 300 mg at bedtime to help for this and for the sleep would be a good alternative, meanwhile I will start weaning resulted in [zolpidem] down, I change his dose down to 6.25 mg CR today.  The gabapentin from tonight.  I also would like to start cognitive behavioral therapy for insomnia I will message our therapists to put him on the schedule as soon as possible  -Obesity: BMI 30.42      Sleep Studies:  -PSG 5/24/2018:  minutes, sleep efficiency 81.3%.  Sleep onset latency of 61.5 minutes, REM latency 136.5  "minutes.  Diminished stage N3 noted.  AHI 31.8, supine AHI 39.6, REM AHI 55.5.  O2 pelon 73%.  PLM index 16.6, PLM arousal index 0.3.    -PAP titration study 2018: Best pressure noted of 11 cm H2O.    ________________________________________________________________________________________________      Interval History: Sachin Li is a 58 y.o. male with a PMHx of HTN, cervical neuropathy, HLD, MDD, anxiety who presents in follow up for HORTENSIA (AHI 31.8, O2 pelon 73% 2018) and insomnia.      He will try togo back to chiropractor when insurance will cover it. (\"It's good now.\") Describes that pain was going down neck and lateral/dorsal L arm, and 1st 3 digits numb and tingly.     SDB:  -Current experience with PAP Therapy: Beneificial; feels more rested, \"I can't sleep without it.\"  -Mask type: FFM  -Difficulties with mask: Denies  -Device: DreamStation 2 auto CPAP  -Difficulties with device: Denies  -Compliance:          Insomnia:  -Current Symptom Severity/Description: Current combination has helped substantially.   -Current Treatment: zolpidem 6.25mg and gabapentin 300mg at bedtime (for cervical radiculopathy)   -Denies amnestic behaviors/parasomnia behaviors  -Prior Treatments Tried:   -Zolpidem 12.5mg  -Has tried not taking zolpidem, but ends up \"popping up\" at 0200, and would be unable to fall back asleep on his own.       [place] Sleepiness Scale:  What are your chances of dozing?   0= no chance  1= slight chance  2= moderate chance  3= high chance    Sitting and readin   Watching TV: 1  Sitting, inactive in a public place (e.g. a theatre or a meeting):0  As a passenger in a car for an hour without a break: 0  Lying down to rest in the afternoon when circumstances permit: 0   Sitting and talking to someone: 0  Sitting quietly after a lunch without alcohol: 0  In a car, while stopped for a few minutes in the traffic: 0       TOTAL  2/24  Greater or equal to 10 is positive for excessive daytime " "sleepiness        SLEEP HYGIENE QUESTIONS:  Gabpentin @~2030, zolpidem right before bed  Bedtime: 2130   Time it takes to fall sleep: ~30-60 minutes  Wake up Time: 0600   Number of times patient wakes up per night: 1  Reason (s) why patient wakes up during the night: restroom or shift position   Estimated total sleep time ( in a 24 hour period of time): ~7-8 hours   Naps: 0    Changes to PMH, PSH, SH: See above     SLEEP RELATED ROS    No Known Allergies    CURRENT MEDICATIONS:  Current Outpatient Medications   Medication Instructions    albuterol (ProAir HFA) 90 mcg/act inhaler 2 puffs, Inhalation, Every 6 hours PRN    atorvastatin (LIPITOR) 40 mg, Oral, Daily    FIBER ADULT GUMMIES PO Oral, 5 grams daily     gabapentin (NEURONTIN) 300 mg, Oral, Daily at bedtime    losartan (COZAAR) 100 mg, Oral, Daily    multivitamin (THERAGRAN) TABS 1 tablet, Oral, Daily    sertraline (ZOLOFT) 100 mg, Oral, Daily    zolpidem (AMBIEN CR) 6.25 mg, Oral, Daily at bedtime PRN           PHYSICAL EXAMINATION:  Vital Signs: /80 (BP Location: Left arm, Patient Position: Sitting, Cuff Size: Standard)   Pulse 76   Ht 5' 10\" (1.778 m)   Wt 97.5 kg (215 lb)   SpO2 96%   BMI 30.85 kg/m²     Constitutional: NAD, well appearing   Mental Status: AAOx3  Skin: Warm, dry, no rashes noted   Eyes: PERRL, normal conjunctiva  ENT: Nasal congestion absent, nasal valve incompetence absent.  Posterior Airspace:   [place] Tongue Position: 4  Retrognathia: absent  Overbite: present, slight  High Arched Palate: absent  Tongue Scalloping/Ridging: absent  Uvula: normal  Chest: No evidence of respiratory distress, no accessory muscle use; no evidence of peripheral cyanosis  Abdomen: Soft, NT/ND  Extremities: No digital clubbing or pedal edema  Neuro: Strength 5/5 throughout, sensation grossly intact      I have spent a total time of 20-30 minutes on 01/04/24 in caring for this patient including Prognosis, Risks and benefits of tx options, " Instructions for management, Patient and family education, Importance of tx compliance, Risk factor reductions, Documenting in the medical record, Reviewing / ordering tests, medicine, procedures  , and Obtaining or reviewing history  .        Electronically signed by:    Henry Nair DO  Board-Certified Neurology and Sleep Medicine  [facility]  01/04/24

## 2024-01-08 ENCOUNTER — TELEPHONE (OUTPATIENT)
Dept: SLEEP CENTER | Facility: CLINIC | Age: 59
End: 2024-01-08

## 2024-01-09 ENCOUNTER — TELEPHONE (OUTPATIENT)
Dept: INTERNAL MEDICINE CLINIC | Facility: OTHER | Age: 59
End: 2024-01-09

## 2024-01-09 DIAGNOSIS — E78.2 MIXED HYPERLIPIDEMIA: ICD-10-CM

## 2024-01-09 DIAGNOSIS — I10 HYPERTENSION, BENIGN: Primary | ICD-10-CM

## 2024-01-09 DIAGNOSIS — R73.9 HYPERGLYCEMIA: ICD-10-CM

## 2024-01-09 DIAGNOSIS — Z12.5 PROSTATE CANCER SCREENING: ICD-10-CM

## 2024-01-09 LAB

## 2024-01-09 NOTE — TELEPHONE ENCOUNTER
Patient dropping off  a copy of EKG please scan to chart and copy Dr Magaña to review he does not have an appointment  at this time and the soonest he would be able to get in is the end of the month.  If the EKG is of an urgent matter if we can fit him in somewhere earlier

## 2024-01-10 ENCOUNTER — TELEPHONE (OUTPATIENT)
Dept: INTERNAL MEDICINE CLINIC | Age: 59
End: 2024-01-10

## 2024-01-19 ENCOUNTER — TELEPHONE (OUTPATIENT)
Dept: BEHAVIORAL/MENTAL HEALTH CLINIC | Facility: CLINIC | Age: 59
End: 2024-01-19

## 2024-01-19 NOTE — TELEPHONE ENCOUNTER
Writer spoke with Sachin about wait list for CBT-I wait list.  Sachin would like to be kept on the wait list at this time.

## 2024-02-19 DIAGNOSIS — E78.2 MIXED HYPERLIPIDEMIA: ICD-10-CM

## 2024-02-19 RX ORDER — ATORVASTATIN CALCIUM 40 MG/1
40 TABLET, FILM COATED ORAL DAILY
Qty: 90 TABLET | Refills: 1 | Status: SHIPPED | OUTPATIENT
Start: 2024-02-19

## 2024-04-24 DIAGNOSIS — G54.2 CERVICAL NEUROPATHY: ICD-10-CM

## 2024-04-24 DIAGNOSIS — I10 ESSENTIAL HYPERTENSION, BENIGN: ICD-10-CM

## 2024-04-24 RX ORDER — LOSARTAN POTASSIUM 100 MG/1
100 TABLET ORAL DAILY
Qty: 30 TABLET | Refills: 0 | Status: SHIPPED | OUTPATIENT
Start: 2024-04-24 | End: 2024-05-07 | Stop reason: SDUPTHER

## 2024-04-25 RX ORDER — GABAPENTIN 300 MG/1
300 CAPSULE ORAL
Qty: 30 CAPSULE | Refills: 2 | Status: SHIPPED | OUTPATIENT
Start: 2024-04-25

## 2024-05-06 ENCOUNTER — PATIENT MESSAGE (OUTPATIENT)
Dept: INTERNAL MEDICINE CLINIC | Facility: OTHER | Age: 59
End: 2024-05-06

## 2024-05-06 DIAGNOSIS — I10 ESSENTIAL HYPERTENSION, BENIGN: ICD-10-CM

## 2024-05-08 RX ORDER — LOSARTAN POTASSIUM 100 MG/1
100 TABLET ORAL DAILY
Qty: 90 TABLET | Refills: 0 | Status: SHIPPED | OUTPATIENT
Start: 2024-05-08

## 2024-05-13 ENCOUNTER — OFFICE VISIT (OUTPATIENT)
Dept: INTERNAL MEDICINE CLINIC | Age: 59
End: 2024-05-13
Payer: COMMERCIAL

## 2024-05-13 VITALS
HEART RATE: 67 BPM | SYSTOLIC BLOOD PRESSURE: 140 MMHG | OXYGEN SATURATION: 97 % | DIASTOLIC BLOOD PRESSURE: 90 MMHG | HEIGHT: 70 IN | BODY MASS INDEX: 30.95 KG/M2 | WEIGHT: 216.2 LBS | TEMPERATURE: 98 F

## 2024-05-13 DIAGNOSIS — E78.2 MIXED HYPERLIPIDEMIA: ICD-10-CM

## 2024-05-13 DIAGNOSIS — G54.2 CERVICAL NEUROPATHY: ICD-10-CM

## 2024-05-13 DIAGNOSIS — G54.2 CERVICAL NEUROPATHY: Primary | ICD-10-CM

## 2024-05-13 DIAGNOSIS — G47.33 OSA (OBSTRUCTIVE SLEEP APNEA): ICD-10-CM

## 2024-05-13 DIAGNOSIS — I10 HYPERTENSION, BENIGN: Primary | ICD-10-CM

## 2024-05-13 DIAGNOSIS — F41.9 ANXIETY: ICD-10-CM

## 2024-05-13 DIAGNOSIS — F33.2 SEVERE EPISODE OF RECURRENT MAJOR DEPRESSIVE DISORDER, WITHOUT PSYCHOTIC FEATURES (HCC): ICD-10-CM

## 2024-05-13 PROCEDURE — 99214 OFFICE O/P EST MOD 30 MIN: CPT | Performed by: INTERNAL MEDICINE

## 2024-05-13 RX ORDER — HYDROCHLOROTHIAZIDE 25 MG/1
25 TABLET ORAL DAILY
Qty: 90 TABLET | Refills: 1 | Status: SHIPPED | OUTPATIENT
Start: 2024-05-13

## 2024-05-13 NOTE — ASSESSMENT & PLAN NOTE
Home blood pressure diary reviewed.  Systolic most of the time in the upper 130s and diastolic in low 80s.  Presently he is on losartan 100 mg daily.  Will add hydrochlorothiazide 25 mg daily.  Continue with low-salt diet, exercise and weight loss.

## 2024-05-13 NOTE — ASSESSMENT & PLAN NOTE
Repeat x-ray cervical spine requested and also referred patient to comprehensive spine management.  Continue with present dose of gabapentin.

## 2024-05-13 NOTE — PROGRESS NOTES
Assessment/Plan:    Hypertension, benign  Home blood pressure diary reviewed.  Systolic most of the time in the upper 130s and diastolic in low 80s.  Presently he is on losartan 100 mg daily.  Will add hydrochlorothiazide 25 mg daily.  Continue with low-salt diet, exercise and weight loss.    HORTENSIA (obstructive sleep apnea)  Doing well on CPAP machine.  Also managed by sleep medicine specialist    Cervical neuropathy  Repeat x-ray cervical spine requested and also referred patient to comprehensive spine management.  Continue with present dose of gabapentin.    Severe episode of recurrent major depressive disorder, without psychotic features (HCC)  Doing well on present regimen.  Will continue with sertraline 100 mg daily.    Hyperlipidemia  Continue with atorvastatin 40 mg daily along with low-fat diet and exercise.       Diagnoses and all orders for this visit:    Hypertension, benign  -     hydroCHLOROthiazide 25 mg tablet; Take 1 tablet (25 mg total) by mouth daily    HORTENSIA (obstructive sleep apnea)    Severe episode of recurrent major depressive disorder, without psychotic features (HCC)    Mixed hyperlipidemia    Cervical neuropathy  -     XR spine cervical complete 4 or 5 vw non injury; Future  -     Ambulatory Referral to Comprehensive Spine Program; Future    Anxiety            Depression Screening and Follow-up Plan: Patient's depression screening was positive with a PHQ-9 score of 8. Patient assessed for underlying major depression. Brief counseling provided and recommend additional follow-up/re-evaluation next office visit.         Subjective:          Patient ID: Sacihn Li is a 58 y.o. male.    Patient is here for follow-up.  He was unable to do the blood test requested on previous visit.  Also complaining of neck pain and radiation to left arm and shoulder.  He was under the care of chiropractor but due to change in insurance he is unable to see chiropractor now.  He is sleep medicine specialist  started on gabapentin 300 mg at night and he is feeling much better.        The following portions of the patient's history were reviewed and updated as appropriate: allergies, current medications, past family history, past medical history, past social history, past surgical history, and problem list.    Review of Systems   Constitutional:  Negative for fatigue and fever.   HENT:  Negative for congestion, ear discharge, ear pain, postnasal drip, sinus pressure, sore throat, tinnitus and trouble swallowing.    Eyes:  Negative for discharge, itching and visual disturbance.   Respiratory:  Negative for cough and shortness of breath.    Cardiovascular:  Negative for chest pain and palpitations.   Gastrointestinal:  Negative for abdominal pain, diarrhea, nausea and vomiting.   Endocrine: Negative for cold intolerance and polyuria.   Genitourinary:  Negative for difficulty urinating, dysuria and urgency.   Musculoskeletal:  Positive for neck pain. Negative for arthralgias.   Skin:  Negative for rash.   Allergic/Immunologic: Negative for environmental allergies.   Neurological:  Positive for numbness. Negative for dizziness, weakness and headaches.   Psychiatric/Behavioral:  Negative for agitation and behavioral problems. The patient is not nervous/anxious.          Past Medical History:   Diagnosis Date    Colon polyp     COVID-19 11/28/2020    Hypercholesteremia     Hypertension          Current Outpatient Medications:     atorvastatin (LIPITOR) 40 mg tablet, Take 1 tablet (40 mg total) by mouth daily, Disp: 90 tablet, Rfl: 1    gabapentin (Neurontin) 300 mg capsule, Take 1 capsule (300 mg total) by mouth daily at bedtime, Disp: 30 capsule, Rfl: 2    hydroCHLOROthiazide 25 mg tablet, Take 1 tablet (25 mg total) by mouth daily, Disp: 90 tablet, Rfl: 1    losartan (COZAAR) 100 MG tablet, Take 1 tablet (100 mg total) by mouth daily, Disp: 90 tablet, Rfl: 0    multivitamin (THERAGRAN) TABS, Take 1 tablet by mouth daily,  "Disp: , Rfl:     sertraline (Zoloft) 100 mg tablet, Take 1 tablet (100 mg total) by mouth daily, Disp: 90 tablet, Rfl: 1    zolpidem (AMBIEN CR) 6.25 MG CR tablet, Take 1 tablet (6.25 mg total) by mouth daily at bedtime as needed for sleep, Disp: 30 tablet, Rfl: 2    albuterol (ProAir HFA) 90 mcg/act inhaler, Inhale 2 puffs every 6 (six) hours as needed for wheezing (Patient not taking: Reported on 5/13/2024), Disp: 8.5 g, Rfl: 0    FIBER ADULT GUMMIES PO, Take by mouth 5 grams daily (Patient not taking: Reported on 5/13/2024), Disp: , Rfl:     No Known Allergies    Social History   Past Surgical History:   Procedure Laterality Date    COLONOSCOPY      ME COLONOSCOPY FLX DX W/COLLJ SPEC WHEN PFRMD N/A 5/9/2016    Procedure: COLONOSCOPY;  Surgeon: Nicko Boyer MD;  Location: [facility];  Service: Gastroenterology    ME ESOPHAGOGASTRODUODENOSCOPY TRANSORAL DIAGNOSTIC N/A 5/9/2016    Procedure: ESOPHAGOGASTRODUODENOSCOPY (EGD);  Surgeon: Nicko Boyer MD;  Location: [facility];  Service: Gastroenterology    SKIN BIOPSY      Each additional lesion    UPPER GASTROINTESTINAL ENDOSCOPY      WISDOM TOOTH EXTRACTION       Family History   Problem Relation Age of Onset    Hypertension Mother     Leukemia Mother     Lung cancer Father     Hypertension Brother     Leukemia Family     Leukemia Family        Objective:  /90 (BP Location: Left arm, Patient Position: Sitting, Cuff Size: Standard)   Pulse 67   Temp 98 °F (36.7 °C) (Temporal)   Ht 5' 10.47\" (1.79 m)   Wt 98.1 kg (216 lb 3.2 oz)   SpO2 97%   BMI 30.61 kg/m²   Body mass index is 30.61 kg/m².     Physical Exam  Constitutional:       General: He is not in acute distress.     Appearance: He is well-developed. He is not ill-appearing.   HENT:      Head: Normocephalic.      Right Ear: External ear normal. There is no impacted cerumen.      Left Ear: External ear normal. There is no impacted cerumen.      Nose: No congestion or rhinorrhea.      Mouth/Throat:      " Pharynx: No posterior oropharyngeal erythema.   Eyes:      General: No scleral icterus.     Pupils: Pupils are equal, round, and reactive to light.   Neck:      Thyroid: No thyromegaly.      Trachea: No tracheal deviation.   Cardiovascular:      Rate and Rhythm: Normal rate and regular rhythm.      Heart sounds: Normal heart sounds. No murmur heard.  Pulmonary:      Effort: Pulmonary effort is normal. No respiratory distress.      Breath sounds: Normal breath sounds.   Chest:      Chest wall: No tenderness.   Abdominal:      General: Bowel sounds are normal.      Palpations: Abdomen is soft. There is no mass.      Tenderness: There is no abdominal tenderness.   Musculoskeletal:         General: Normal range of motion.      Cervical back: Normal range of motion and neck supple. No rigidity.      Right lower leg: No edema.      Left lower leg: No edema.   Lymphadenopathy:      Cervical: No cervical adenopathy.   Skin:     General: Skin is warm.      Findings: No erythema or rash.   Neurological:      Mental Status: He is alert and oriented to person, place, and time.      Cranial Nerves: No cranial nerve deficit.      Sensory: No sensory deficit.

## 2024-05-21 DIAGNOSIS — F51.04 CHRONIC INSOMNIA: ICD-10-CM

## 2024-05-21 DIAGNOSIS — F51.04 PSYCHOPHYSIOLOGICAL INSOMNIA: ICD-10-CM

## 2024-05-22 RX ORDER — ZOLPIDEM TARTRATE 6.25 MG/1
6.25 TABLET, FILM COATED, EXTENDED RELEASE ORAL
Qty: 30 TABLET | Refills: 2 | Status: SHIPPED | OUTPATIENT
Start: 2024-05-22

## 2024-05-24 DIAGNOSIS — F41.9 ANXIETY: ICD-10-CM

## 2024-05-24 DIAGNOSIS — F33.2 SEVERE EPISODE OF RECURRENT MAJOR DEPRESSIVE DISORDER, WITHOUT PSYCHOTIC FEATURES (HCC): ICD-10-CM

## 2024-05-24 RX ORDER — SERTRALINE HYDROCHLORIDE 100 MG/1
100 TABLET, FILM COATED ORAL DAILY
Qty: 90 TABLET | Refills: 1 | Status: SHIPPED | OUTPATIENT
Start: 2024-05-24

## 2024-06-05 NOTE — PSYCH
Psychotherapy Provided: Individual Psychotherapy 60 minutes     Length of time in session: 60 minutes, follow up in 1 week    Encounter Diagnosis     ICD-10-CM    1  Severe episode of recurrent major depressive disorder, without psychotic features ([facility] [address] )  F33 2    2  Anxiety  F41 9        Goals addressed in session: Goal 1 and Goal 2     Current suicide risk : Low     D: Clinician met with Rg Hurtado in person for individual therapy  Rg Hurtado reports continued finance stress and confusion with short term disability process  He reports continued follow through with getting information to his insurance company and extension of short term disability to long term disability  He discussed attempts to set a more consistent schedule for himself daily and work on greater use of coping skills and management of anxiety  Rg Hurtado reports attempts to get outside and walk and complete yard work when his granddaughter is at school and that this has been helpful in short periods of time  Clinician discussed psycho education on the benefits of long term use of coping skills and keeping his mind busy during the day  Rg Hurtado reports continued follow through with coping skills  He reports issued with sustained focus, sadness fatigue, issues with completing daily hygiene, lack of motivation and sleeplessness  A: Rg Hurtado presented with depressed mood and affect  He was dressed and groomed appropriate and was engaged in the session  Rg Hurtado reports follow through with sleep specialist to work on greater quality and quantity of sleep  He reports missed appointment last week due to possible COVID exposure  P: Continue to meet with Rg Hurtado weekly for individual therapy  Behavioral Health Treatment Plan [facility]: Diagnosis and Treatment Plan explained to Negar Zuñiga relates understanding diagnosis and is agreeable to Treatment Plan   Yes Stable

## 2024-08-02 DIAGNOSIS — I10 ESSENTIAL HYPERTENSION, BENIGN: ICD-10-CM

## 2024-08-02 RX ORDER — LOSARTAN POTASSIUM 100 MG/1
100 TABLET ORAL DAILY
Qty: 90 TABLET | Refills: 0 | Status: SHIPPED | OUTPATIENT
Start: 2024-08-02

## 2024-08-08 DIAGNOSIS — G54.2 CERVICAL NEUROPATHY: ICD-10-CM

## 2024-08-09 RX ORDER — GABAPENTIN 300 MG/1
300 CAPSULE ORAL
Qty: 30 CAPSULE | Refills: 1 | Status: SHIPPED | OUTPATIENT
Start: 2024-08-09

## 2024-08-19 DIAGNOSIS — E78.2 MIXED HYPERLIPIDEMIA: ICD-10-CM

## 2024-08-19 RX ORDER — ATORVASTATIN CALCIUM 40 MG/1
40 TABLET, FILM COATED ORAL DAILY
Qty: 30 TABLET | Refills: 0 | Status: SHIPPED | OUTPATIENT
Start: 2024-08-19

## 2024-08-19 NOTE — TELEPHONE ENCOUNTER
Patient requires updated blood work and has previously placed orders. Please contact patient to go for labs. Courtesy refill provided.  Lipid Panel    A [facility] message was sent to the patient letting them know a courtesy refill has been provided and to complete lab(s) prior to next refill. Please follow up to make sure pt receives/reads message and completes blood work.

## 2024-08-27 DIAGNOSIS — I10 HYPERTENSION, BENIGN: ICD-10-CM

## 2024-08-28 DIAGNOSIS — F51.04 PSYCHOPHYSIOLOGICAL INSOMNIA: ICD-10-CM

## 2024-08-28 DIAGNOSIS — F51.04 CHRONIC INSOMNIA: ICD-10-CM

## 2024-08-28 RX ORDER — ZOLPIDEM TARTRATE 6.25 MG/1
6.25 TABLET, FILM COATED, EXTENDED RELEASE ORAL
Qty: 30 TABLET | Refills: 2 | Status: SHIPPED | OUTPATIENT
Start: 2024-08-28

## 2024-08-28 RX ORDER — HYDROCHLOROTHIAZIDE 25 MG/1
25 TABLET ORAL DAILY
Qty: 30 TABLET | Refills: 0 | Status: SHIPPED | OUTPATIENT
Start: 2024-08-28

## 2024-08-28 NOTE — TELEPHONE ENCOUNTER
I will provide this refill, however he needs a scheduled appointment with me before I provide further refills.

## 2024-08-28 NOTE — TELEPHONE ENCOUNTER
Last office visit 1/4/24.  6-month follow up that was scheduled on 7/5/24 was cancelled since provider was out of office.  Patient was not rescheduled.     Called patient and left message advising to call our office to schedule a follow up appointment with Dr. Nair.  Office number provided. Advised his refill request will be sent to Dr. Nair.    Dr. Nair please review Rx and sign if appropriate.  Thanks.

## 2024-08-31 LAB
ALBUMIN SERPL-MCNC: 4.9 G/DL (ref 3.6–5.1)
ALBUMIN/GLOB SERPL: 2 (CALC) (ref 1–2.5)
ALP SERPL-CCNC: 73 U/L (ref 35–144)
ALT SERPL-CCNC: 28 U/L (ref 9–46)
AST SERPL-CCNC: 22 U/L (ref 10–35)
BILIRUB SERPL-MCNC: 0.7 MG/DL (ref 0.2–1.2)
BUN SERPL-MCNC: 17 MG/DL (ref 7–25)
BUN/CREAT SERPL: ABNORMAL (CALC) (ref 6–22)
CALCIUM SERPL-MCNC: 9.9 MG/DL (ref 8.6–10.3)
CHLORIDE SERPL-SCNC: 100 MMOL/L (ref 98–110)
CHOLEST SERPL-MCNC: 215 MG/DL
CHOLEST/HDLC SERPL: 3.1 (CALC)
CO2 SERPL-SCNC: 30 MMOL/L (ref 20–32)
CREAT SERPL-MCNC: 0.87 MG/DL (ref 0.7–1.3)
ERYTHROCYTE [DISTWIDTH] IN BLOOD BY AUTOMATED COUNT: 12.6 % (ref 11–15)
EST. AVERAGE GLUCOSE BLD GHB EST-MCNC: 120 MG/DL
EST. AVERAGE GLUCOSE BLD GHB EST-SCNC: 6.6 MMOL/L
GFR/BSA.PRED SERPLBLD CYS-BASED-ARV: 99 ML/MIN/1.73M2
GLOBULIN SER CALC-MCNC: 2.5 G/DL (CALC) (ref 1.9–3.7)
GLUCOSE SERPL-MCNC: 100 MG/DL (ref 65–99)
HBA1C MFR BLD: 5.8 % OF TOTAL HGB
HCT VFR BLD AUTO: 42.1 % (ref 38.5–50)
HDLC SERPL-MCNC: 70 MG/DL
HGB BLD-MCNC: 14.3 G/DL (ref 13.2–17.1)
LDLC SERPL CALC-MCNC: 116 MG/DL (CALC)
MCH RBC QN AUTO: 30.8 PG (ref 27–33)
MCHC RBC AUTO-ENTMCNC: 34 G/DL (ref 32–36)
MCV RBC AUTO: 90.5 FL (ref 80–100)
NONHDLC SERPL-MCNC: 145 MG/DL (CALC)
PLATELET # BLD AUTO: 289 THOUSAND/UL (ref 140–400)
PMV BLD REES-ECKER: 9.3 FL (ref 7.5–12.5)
POTASSIUM SERPL-SCNC: 4.4 MMOL/L (ref 3.5–5.3)
PROT SERPL-MCNC: 7.4 G/DL (ref 6.1–8.1)
PSA SERPL-MCNC: 0.58 NG/ML
RBC # BLD AUTO: 4.65 MILLION/UL (ref 4.2–5.8)
SODIUM SERPL-SCNC: 139 MMOL/L (ref 135–146)
TRIGL SERPL-MCNC: 173 MG/DL
WBC # BLD AUTO: 5.8 THOUSAND/UL (ref 3.8–10.8)

## 2024-09-04 ENCOUNTER — OFFICE VISIT (OUTPATIENT)
Dept: INTERNAL MEDICINE CLINIC | Age: 59
End: 2024-09-04
Payer: COMMERCIAL

## 2024-09-04 VITALS
OXYGEN SATURATION: 98 % | WEIGHT: 215 LBS | SYSTOLIC BLOOD PRESSURE: 126 MMHG | HEART RATE: 71 BPM | TEMPERATURE: 98 F | HEIGHT: 70 IN | DIASTOLIC BLOOD PRESSURE: 80 MMHG | BODY MASS INDEX: 30.78 KG/M2

## 2024-09-04 DIAGNOSIS — F33.2 SEVERE EPISODE OF RECURRENT MAJOR DEPRESSIVE DISORDER, WITHOUT PSYCHOTIC FEATURES (HCC): ICD-10-CM

## 2024-09-04 DIAGNOSIS — R73.03 PREDIABETES: Primary | ICD-10-CM

## 2024-09-04 DIAGNOSIS — I10 HYPERTENSION, BENIGN: ICD-10-CM

## 2024-09-04 DIAGNOSIS — E78.2 MIXED HYPERLIPIDEMIA: ICD-10-CM

## 2024-09-04 PROCEDURE — 99214 OFFICE O/P EST MOD 30 MIN: CPT | Performed by: INTERNAL MEDICINE

## 2024-09-04 RX ORDER — ATORVASTATIN CALCIUM 40 MG/1
40 TABLET, FILM COATED ORAL DAILY
Qty: 90 TABLET | Refills: 1 | Status: SHIPPED | OUTPATIENT
Start: 2024-09-04

## 2024-09-04 NOTE — PROGRESS NOTES
Assessment/Plan:    Hypertension, benign  Well-controlled on present regimen.  Continue with low-salt diet and exercise    Severe episode of recurrent major depressive disorder, without psychotic features (HCC)  Doing well on present regimen.  He completed his psychotherapy is also.  Continue with healthy lifestyle and exercise    Hyperlipidemia  His lipid profile is elevated than before.  But he was off his atorvastatin 40 mg for about 10 days.  Advised him to take it on daily basis and will repeat lipid profile in 4 months and will act accordingly.  Also advised for low-fat diet and exercise to lose some weight    Prediabetes  Recent hemoglobin A1c is 5.8.  Previous was 5.4.  Advised for low sugar/low-carb diet and exercise.  Will repeat in about 4 months again and will act accordingly.  Continue with exercise and to lose weight       Diagnoses and all orders for this visit:    Prediabetes  -     Hemoglobin A1C; Future    Mixed hyperlipidemia  -     atorvastatin (LIPITOR) 40 mg tablet; Take 1 tablet (40 mg total) by mouth daily  -     Lipid Panel with Direct LDL reflex; Future  -     Lipid Panel with Direct LDL reflex    Hypertension, benign    Severe episode of recurrent major depressive disorder, without psychotic features (HCC)               Subjective:          Patient ID: Sachin Li is a 59 y.o. male.    Patient is here for follow-up.  Also had blood work done would like to discuss results.  Otherwise doing well.        The following portions of the patient's history were reviewed and updated as appropriate: allergies, current medications, past family history, past medical history, past social history, past surgical history, and problem list.    Review of Systems   Constitutional:  Negative for fatigue and fever.   HENT:  Negative for congestion, ear discharge, ear pain, postnasal drip, sinus pressure, sore throat, tinnitus and trouble swallowing.    Eyes:  Negative for discharge, itching and visual  disturbance.   Respiratory:  Negative for cough and shortness of breath.    Cardiovascular:  Negative for chest pain and palpitations.   Gastrointestinal:  Negative for abdominal pain, diarrhea, nausea and vomiting.   Endocrine: Negative for cold intolerance and polyuria.   Genitourinary:  Negative for difficulty urinating, dysuria and urgency.   Musculoskeletal:  Negative for arthralgias and neck pain.   Skin:  Negative for rash.   Allergic/Immunologic: Negative for environmental allergies.   Neurological:  Negative for dizziness, weakness and headaches.   Psychiatric/Behavioral:  Negative for agitation and behavioral problems. The patient is not nervous/anxious.          Past Medical History:   Diagnosis Date    Colon polyp     COVID-19 11/28/2020    Hypercholesteremia     Hypertension          Current Outpatient Medications:     atorvastatin (LIPITOR) 40 mg tablet, Take 1 tablet (40 mg total) by mouth daily, Disp: 90 tablet, Rfl: 1    gabapentin (Neurontin) 300 mg capsule, Take 1 capsule (300 mg total) by mouth daily at bedtime, Disp: 30 capsule, Rfl: 1    hydroCHLOROthiazide 25 mg tablet, TAKE 1 TABLET (25 MG TOTAL) BY MOUTH DAILY., Disp: 30 tablet, Rfl: 0    losartan (COZAAR) 100 MG tablet, TAKE 1 TABLET BY MOUTH EVERY DAY, Disp: 90 tablet, Rfl: 0    multivitamin (THERAGRAN) TABS, Take 1 tablet by mouth daily, Disp: , Rfl:     sertraline (ZOLOFT) 100 mg tablet, TAKE 1 TABLET BY MOUTH EVERY DAY, Disp: 90 tablet, Rfl: 1    zolpidem (AMBIEN CR) 6.25 MG CR tablet, Take 1 tablet (6.25 mg total) by mouth daily at bedtime as needed for sleep, Disp: 30 tablet, Rfl: 2    albuterol (ProAir HFA) 90 mcg/act inhaler, Inhale 2 puffs every 6 (six) hours as needed for wheezing (Patient not taking: Reported on 5/13/2024), Disp: 8.5 g, Rfl: 0    FIBER ADULT GUMMIES PO, Take by mouth 5 grams daily (Patient not taking: Reported on 5/13/2024), Disp: , Rfl:     No Known Allergies    Social History   Past Surgical History:  "  Procedure Laterality Date    COLONOSCOPY      WI COLONOSCOPY FLX DX W/COLLJ SPEC WHEN PFRMD N/A 5/9/2016    Procedure: COLONOSCOPY;  Surgeon: Nicko Boyer MD;  Location: [facility];  Service: Gastroenterology    WI ESOPHAGOGASTRODUODENOSCOPY TRANSORAL DIAGNOSTIC N/A 5/9/2016    Procedure: ESOPHAGOGASTRODUODENOSCOPY (EGD);  Surgeon: Nicko Boyer MD;  Location: [facility];  Service: Gastroenterology    SKIN BIOPSY      Each additional lesion    UPPER GASTROINTESTINAL ENDOSCOPY      WISDOM TOOTH EXTRACTION       Family History   Problem Relation Age of Onset    Hypertension Mother     Leukemia Mother     Lung cancer Father     Hypertension Brother     Leukemia Family     Leukemia Family        Objective:  /80 (BP Location: Left arm, Patient Position: Sitting, Cuff Size: Adult)   Pulse 71   Temp 98 °F (36.7 °C) (Temporal)   Ht 5' 10\" (1.778 m)   Wt 97.5 kg (215 lb)   SpO2 98% Comment: ra  BMI 30.85 kg/m²   Body mass index is 30.85 kg/m².     Physical Exam  Constitutional:       General: He is not in acute distress.     Appearance: He is well-developed. He is obese.   HENT:      Head: Normocephalic.      Right Ear: External ear normal. There is no impacted cerumen.      Left Ear: External ear normal. There is no impacted cerumen.      Nose: No rhinorrhea.      Mouth/Throat:      Pharynx: No posterior oropharyngeal erythema.   Eyes:      General: No scleral icterus.     Pupils: Pupils are equal, round, and reactive to light.   Neck:      Thyroid: No thyromegaly.      Trachea: No tracheal deviation.   Cardiovascular:      Rate and Rhythm: Normal rate and regular rhythm.      Heart sounds: Normal heart sounds. No murmur heard.  Pulmonary:      Effort: Pulmonary effort is normal. No respiratory distress.      Breath sounds: Normal breath sounds.   Chest:      Chest wall: No tenderness.   Abdominal:      General: Bowel sounds are normal.      Palpations: Abdomen is soft. There is no mass.      Tenderness: There " is no abdominal tenderness.   Musculoskeletal:         General: Normal range of motion.      Cervical back: Normal range of motion and neck supple. No rigidity.      Right lower leg: No edema.      Left lower leg: No edema.   Lymphadenopathy:      Cervical: No cervical adenopathy.   Skin:     General: Skin is warm.      Findings: No erythema or rash.   Neurological:      Mental Status: He is alert and oriented to person, place, and time.      Cranial Nerves: No cranial nerve deficit.   Psychiatric:         Mood and Affect: Mood normal.         Behavior: Behavior normal.

## 2024-09-04 NOTE — ASSESSMENT & PLAN NOTE
Recent hemoglobin A1c is 5.8.  Previous was 5.4.  Advised for low sugar/low-carb diet and exercise.  Will repeat in about 4 months again and will act accordingly.  Continue with exercise and to lose weight

## 2024-09-04 NOTE — ASSESSMENT & PLAN NOTE
His lipid profile is elevated than before.  But he was off his atorvastatin 40 mg for about 10 days.  Advised him to take it on daily basis and will repeat lipid profile in 4 months and will act accordingly.  Also advised for low-fat diet and exercise to lose some weight

## 2024-09-04 NOTE — ASSESSMENT & PLAN NOTE
Doing well on present regimen.  He completed his psychotherapy is also.  Continue with healthy lifestyle and exercise

## 2024-09-26 ENCOUNTER — TELEPHONE (OUTPATIENT)
Age: 59
End: 2024-09-26

## 2024-09-26 NOTE — TELEPHONE ENCOUNTER
Patient is calling to report that he is experiencing lethargic, sore throat that started Tuesday night, congested, no fever runny nose, chills and some stomach issues and labored breathing    He states he did take a COVID test and was negative, he wants to know if he absolutely needs to be seen or if a prescription can be called to his local pharmacy    He states yesterday he was not able to drive from feeling so tired

## 2024-09-30 ENCOUNTER — TELEPHONE (OUTPATIENT)
Dept: INTERNAL MEDICINE CLINIC | Age: 59
End: 2024-09-30

## 2024-10-14 DIAGNOSIS — G54.2 CERVICAL NEUROPATHY: ICD-10-CM

## 2024-10-14 RX ORDER — GABAPENTIN 300 MG/1
300 CAPSULE ORAL
Qty: 30 CAPSULE | Refills: 1 | Status: SHIPPED | OUTPATIENT
Start: 2024-10-14

## 2024-10-14 NOTE — TELEPHONE ENCOUNTER
Last office visit 01/04/2024  Next office visit 12/18/2024    Dr. Nair,     Please review RX request and sign if agreeable,  Thank you

## 2024-11-01 DIAGNOSIS — I10 ESSENTIAL HYPERTENSION, BENIGN: ICD-10-CM

## 2024-11-01 RX ORDER — LOSARTAN POTASSIUM 100 MG/1
100 TABLET ORAL DAILY
Qty: 90 TABLET | Refills: 1 | Status: SHIPPED | OUTPATIENT
Start: 2024-11-01

## 2024-11-21 DIAGNOSIS — F33.2 SEVERE EPISODE OF RECURRENT MAJOR DEPRESSIVE DISORDER, WITHOUT PSYCHOTIC FEATURES (HCC): ICD-10-CM

## 2024-11-21 DIAGNOSIS — F41.9 ANXIETY: ICD-10-CM

## 2024-11-21 RX ORDER — SERTRALINE HYDROCHLORIDE 100 MG/1
100 TABLET, FILM COATED ORAL DAILY
Qty: 90 TABLET | Refills: 0 | Status: SHIPPED | OUTPATIENT
Start: 2024-11-21

## 2024-12-06 DIAGNOSIS — F51.04 CHRONIC INSOMNIA: ICD-10-CM

## 2024-12-06 DIAGNOSIS — I10 HYPERTENSION, BENIGN: ICD-10-CM

## 2024-12-06 DIAGNOSIS — F51.04 PSYCHOPHYSIOLOGICAL INSOMNIA: ICD-10-CM

## 2024-12-06 RX ORDER — HYDROCHLOROTHIAZIDE 25 MG/1
25 TABLET ORAL DAILY
Qty: 90 TABLET | Refills: 1 | Status: SHIPPED | OUTPATIENT
Start: 2024-12-06

## 2024-12-06 RX ORDER — ZOLPIDEM TARTRATE 6.25 MG/1
6.25 TABLET, FILM COATED, EXTENDED RELEASE ORAL
Qty: 30 TABLET | Refills: 0 | Status: SHIPPED | OUTPATIENT
Start: 2024-12-06

## 2024-12-06 NOTE — TELEPHONE ENCOUNTER
Patient Id Prescription # Filled Written Drug Label Qty Days Strength MME** Prescriber Pharmacy Payment REFILL #/Auth State Detail  1 [id number] 11/05/2024 08/28/2024 Zolpidem Tartrate (Tablet, Extended Release) 30.0 30 6.25 MG NA [facility] Commercial Insurance 2 / 2 [place]   1 [id number] 10/07/2024 08/28/2024 Zolpidem Tartrate (Tablet, Extended Release) 30.0 30 6.25 MG NA [facility] Commercial Insurance 1 / 2 [place]   1 [id number] 08/28/2024 08/28/2024 Zolpidem Tartrate (Tablet, Extended Release) 30.0 30 6.25 MG NA [facility] Commercial Insurance 0 / 2 PA   1 [id number] 07/28/2024 05/22/2024 Zolpidem Tartrate (Tablet, Extended Release) 30.0 30 6.25 MG NA [facility] Commercial Insurance 2 / 2 PA   1 [id number] 06/26/2024 05/22/2024 Zolpidem Tartrate (Tablet, Extended Release) 30.0 30 6.25 MG NA [facility] Commercial Insurance 1 / 2 PA   1 [id number] 05/22/2024 05/22/2024 Zolpidem Tartrate (Tablet, Extended Release) 30.0 30 6.25 MG [facility] Commercial Insurance 0 / 2 PA   1 [id number] 04/12/2024 01/04/2024 Zolpidem Tartrate (Tablet, Extended Release) 30.0 30 6.25 MG NA [facility] Commercial Insurance 2 / 2 PA   1 [id number] 02/16/2024 01/04/2024 Zolpidem Tartrate (Tablet, Extended Release) 30.0 30 6.25 MG NA [facility] Commercial Insurance 1 / 2 PA   1 [id number] 01/07/2024 01/04/2024 Zolpidem Tartrate (Tablet, Extended Release) 30.0 30 6.25 MG NA SIMONE MALLORY [facility] Commercial Insurance 0 / 2 PA   1 [id number] 12/11/2023 12/11/2023 Zolpidem Tartrate (Tablet, Extended Release) 30.0 30 6.25 MG NA ENDY MADISON [facility] Commercial Insurance 0 / 0 PA

## 2024-12-16 DIAGNOSIS — G54.2 CERVICAL NEUROPATHY: ICD-10-CM

## 2024-12-16 NOTE — TELEPHONE ENCOUNTER
Medication: Gabapentin 300 mg    Dose/Frequency: Take 1 capsule by mouth daily at bedtime    Quantity: 30 days    Pharmacy: [facility]    Office:   [] PCP/Provider -   [x] Speciality/Provider - Korey Figueroa    Does the patient have enough for 3 days?   [x] Yes   [] No - Send as HP to POD

## 2024-12-17 NOTE — TELEPHONE ENCOUNTER
Last office visit 1/4/2024.  Next office visit 1/27/2025.    Dr. Nair, please review Rx request and sign if appropriate.  Thank you.

## 2024-12-20 RX ORDER — GABAPENTIN 300 MG/1
300 CAPSULE ORAL
Qty: 30 CAPSULE | Refills: 1 | Status: SHIPPED | OUTPATIENT
Start: 2024-12-20

## 2025-01-17 DIAGNOSIS — F51.04 PSYCHOPHYSIOLOGICAL INSOMNIA: ICD-10-CM

## 2025-01-17 DIAGNOSIS — F51.04 CHRONIC INSOMNIA: ICD-10-CM

## 2025-01-17 RX ORDER — ZOLPIDEM TARTRATE 6.25 MG/1
6.25 TABLET, FILM COATED, EXTENDED RELEASE ORAL
Qty: 30 TABLET | Refills: 0 | Status: SHIPPED | OUTPATIENT
Start: 2025-01-17

## 2025-01-17 NOTE — TELEPHONE ENCOUNTER
12/06/2024 12/06/2024 Zolpidem Tartrate (Tablet, Extended Release) 30.0 30 6.25 MG NA [facility] Commercial Insurance 0 / 0 [place]   1 [id number] 11/05/2024 08/28/2024 Zolpidem Tartrate (Tablet, Extended Release) 30.0 30 6.25 MG NA [facility]

## 2025-02-11 ENCOUNTER — OFFICE VISIT (OUTPATIENT)
Dept: INTERNAL MEDICINE CLINIC | Facility: OTHER | Age: 60
End: 2025-02-11
Payer: COMMERCIAL

## 2025-02-11 VITALS
BODY MASS INDEX: 30.61 KG/M2 | TEMPERATURE: 98.8 F | DIASTOLIC BLOOD PRESSURE: 80 MMHG | SYSTOLIC BLOOD PRESSURE: 122 MMHG | HEIGHT: 70 IN | WEIGHT: 213.8 LBS | HEART RATE: 76 BPM | OXYGEN SATURATION: 99 %

## 2025-02-11 DIAGNOSIS — J10.1 INFLUENZA A: ICD-10-CM

## 2025-02-11 DIAGNOSIS — R05.1 ACUTE COUGH: ICD-10-CM

## 2025-02-11 DIAGNOSIS — I10 HYPERTENSION, BENIGN: Primary | ICD-10-CM

## 2025-02-11 DIAGNOSIS — F33.2 SEVERE EPISODE OF RECURRENT MAJOR DEPRESSIVE DISORDER, WITHOUT PSYCHOTIC FEATURES (HCC): ICD-10-CM

## 2025-02-11 LAB
SARS-COV-2 AG UPPER RESP QL IA: NEGATIVE
SL AMB POCT RAPID FLU A: POSITIVE
SL AMB POCT RAPID FLU B: NEGATIVE
VALID CONTROL: NORMAL

## 2025-02-11 PROCEDURE — 87804 INFLUENZA ASSAY W/OPTIC: CPT | Performed by: INTERNAL MEDICINE

## 2025-02-11 PROCEDURE — 87811 SARS-COV-2 COVID19 W/OPTIC: CPT | Performed by: INTERNAL MEDICINE

## 2025-02-11 PROCEDURE — 99213 OFFICE O/P EST LOW 20 MIN: CPT | Performed by: INTERNAL MEDICINE

## 2025-02-11 RX ORDER — OSELTAMIVIR PHOSPHATE 75 MG/1
75 CAPSULE ORAL EVERY 12 HOURS SCHEDULED
Qty: 10 CAPSULE | Refills: 0 | Status: SHIPPED | OUTPATIENT
Start: 2025-02-11 | End: 2025-02-16

## 2025-02-11 NOTE — PROGRESS NOTES
Name: Sachin Li      : 1965      MRN: [id number]  Encounter Provider: Kalen Magaña MD  Encounter Date: 2025   Encounter department: [facility]  :  Assessment & Plan  Hypertension, benign  Well-controlled on present regimen.       Severe episode of recurrent major depressive disorder, without psychotic features (HCC)  Continue with present regimen.         Acute cough  Can take Robitussin cough syrup  Orders:    POCT Rapid Covid Ag    POCT rapid flu A and B    Influenza A  Rapid flu test positive in office today.  COVID-19 antigen test negative.  Will treat with Tamiflu 75 mg p.o. twice daily for 5 days.  Also advised for adequate oral hydration and physical rest.  Can take Tylenol 2 extra strength 3 times a day as needed for body ache and fever  Orders:    oseltamivir (TAMIFLU) 75 mg capsule; Take 1 capsule (75 mg total) by mouth every 12 (twelve) hours for 5 days           History of Present Illness   Patient came to office with a complaint of sinus congestion, fatigue and fever.  Symptoms started 2 days ago..  No nausea vomiting.    URI   Associated symptoms include congestion and coughing. Pertinent negatives include no abdominal pain, chest pain, diarrhea, dysuria, ear pain, headaches, nausea, neck pain, rash, sore throat or vomiting.     Review of Systems   Constitutional:  Positive for fatigue and fever.   HENT:  Positive for congestion and postnasal drip. Negative for ear discharge, ear pain, sinus pressure, sore throat, tinnitus and trouble swallowing.    Eyes:  Negative for discharge, itching and visual disturbance.   Respiratory:  Positive for cough. Negative for shortness of breath.    Cardiovascular:  Negative for chest pain and palpitations.   Gastrointestinal:  Negative for abdominal pain, diarrhea, nausea and vomiting.   Endocrine: Negative for cold intolerance and polyuria.   Genitourinary:  Negative for difficulty urinating, dysuria and  "urgency.   Musculoskeletal:  Negative for arthralgias and neck pain.   Skin:  Negative for rash.   Allergic/Immunologic: Negative for environmental allergies.   Neurological:  Negative for dizziness, weakness and headaches.   Psychiatric/Behavioral:  The patient is not nervous/anxious.        Objective   /80 (BP Location: Left arm, Patient Position: Sitting, Cuff Size: Large)   Pulse 76   Temp 98.8 °F (37.1 °C) (Oral)   Ht 5' 10\" (1.778 m)   Wt 97 kg (213 lb 12.8 oz)   SpO2 99% Comment: ra  BMI 30.68 kg/m²      Physical Exam  Constitutional:       General: He is not in acute distress.     Appearance: He is well-developed.   HENT:      Head: Normocephalic.      Right Ear: External ear normal.      Left Ear: External ear normal.      Nose: Congestion and rhinorrhea present.      Mouth/Throat:      Pharynx: No oropharyngeal exudate or posterior oropharyngeal erythema.   Eyes:      General: No scleral icterus.     Pupils: Pupils are equal, round, and reactive to light.   Neck:      Thyroid: No thyromegaly.      Trachea: No tracheal deviation.   Cardiovascular:      Rate and Rhythm: Normal rate and regular rhythm.      Heart sounds: Normal heart sounds. No murmur heard.  Pulmonary:      Effort: Pulmonary effort is normal. No respiratory distress.      Breath sounds: Normal breath sounds.   Chest:      Chest wall: No tenderness.   Abdominal:      General: Bowel sounds are normal.      Palpations: Abdomen is soft. There is no mass.      Tenderness: There is no abdominal tenderness.   Musculoskeletal:         General: Normal range of motion.      Cervical back: Normal range of motion and neck supple. No rigidity.      Right lower leg: No edema.      Left lower leg: No edema.   Lymphadenopathy:      Cervical: No cervical adenopathy.   Skin:     General: Skin is warm.      Findings: No erythema or rash.   Neurological:      Mental Status: He is alert and oriented to person, place, and time.      Cranial Nerves: No " cranial nerve deficit.   Psychiatric:         Mood and Affect: Mood normal.         Behavior: Behavior normal.

## 2025-02-17 ENCOUNTER — PATIENT MESSAGE (OUTPATIENT)
Dept: SLEEP CENTER | Facility: CLINIC | Age: 60
End: 2025-02-17

## 2025-02-17 DIAGNOSIS — F51.04 CHRONIC INSOMNIA: ICD-10-CM

## 2025-02-17 DIAGNOSIS — F51.04 PSYCHOPHYSIOLOGICAL INSOMNIA: ICD-10-CM

## 2025-02-17 RX ORDER — ZOLPIDEM TARTRATE 6.25 MG/1
6.25 TABLET, FILM COATED, EXTENDED RELEASE ORAL
Qty: 90 TABLET | Refills: 0 | OUTPATIENT
Start: 2025-02-17

## 2025-02-17 NOTE — TELEPHONE ENCOUNTER
Last office visit 1/4/24.  Patient canceled 6-month follow up on 6/19/24.    Follow up on 7/5/24 was rescheduled due to change in provider schedule.  Patient then canceled 12/18/24 follow up.  No-showed for 1/27/25 follow up.   Currently scheduled 5/15/25.    Per patient comment: Can this cover 3 months instead of every 30 days?

## 2025-02-21 RX ORDER — ZOLPIDEM TARTRATE 6.25 MG/1
6.25 TABLET, FILM COATED, EXTENDED RELEASE ORAL
Qty: 30 TABLET | Refills: 0 | Status: SHIPPED | OUTPATIENT
Start: 2025-02-21

## 2025-02-21 NOTE — TELEPHONE ENCOUNTER
I will continue to provide this on a month to month basis. He was contacted several times it looks like back in January of 2024 for CBT-I, which is the definitive treatment for chronic insomnia; we can discuss further medication refills at his appointment in March. I typically will only continue refills IF the patient is pursuing CBT-I.

## 2025-02-26 DIAGNOSIS — E78.2 MIXED HYPERLIPIDEMIA: ICD-10-CM

## 2025-02-26 DIAGNOSIS — F33.2 SEVERE EPISODE OF RECURRENT MAJOR DEPRESSIVE DISORDER, WITHOUT PSYCHOTIC FEATURES (HCC): ICD-10-CM

## 2025-02-26 DIAGNOSIS — F41.9 ANXIETY: ICD-10-CM

## 2025-02-26 RX ORDER — SERTRALINE HYDROCHLORIDE 100 MG/1
100 TABLET, FILM COATED ORAL DAILY
Qty: 90 TABLET | Refills: 1 | Status: SHIPPED | OUTPATIENT
Start: 2025-02-26

## 2025-02-26 RX ORDER — ATORVASTATIN CALCIUM 40 MG/1
40 TABLET, FILM COATED ORAL DAILY
Qty: 90 TABLET | Refills: 1 | Status: SHIPPED | OUTPATIENT
Start: 2025-02-26

## 2025-03-02 DIAGNOSIS — G54.2 CERVICAL NEUROPATHY: ICD-10-CM

## 2025-03-03 NOTE — TELEPHONE ENCOUNTER
Last office visit 1/4/2024.  Next office visit 3/5/2025.    Dr. Nair, please review Rx request and sign if appropriate or address at upcoming office visit.  Thank you.

## 2025-03-07 RX ORDER — GABAPENTIN 300 MG/1
300 CAPSULE ORAL
Qty: 30 CAPSULE | Refills: 0 | Status: SHIPPED | OUTPATIENT
Start: 2025-03-07

## 2025-04-07 DIAGNOSIS — F51.04 PSYCHOPHYSIOLOGICAL INSOMNIA: ICD-10-CM

## 2025-04-07 DIAGNOSIS — F51.04 CHRONIC INSOMNIA: ICD-10-CM

## 2025-04-08 RX ORDER — ZOLPIDEM TARTRATE 6.25 MG/1
6.25 TABLET, FILM COATED, EXTENDED RELEASE ORAL
Qty: 30 TABLET | Refills: 0 | Status: SHIPPED | OUTPATIENT
Start: 2025-04-08

## 2025-04-08 NOTE — TELEPHONE ENCOUNTER
Last office visit 1/4/2024.  Next office visit 7/23/2025.    Dr. Nair, please review Rx request and sign if agreeable.  Patient has cancelled appointments and re-scheduled multiple times. Thank you.

## 2025-04-08 NOTE — TELEPHONE ENCOUNTER
Patient Id Prescription # Filled Written Drug Label Qty Days Strength MME** Prescriber Pharmacy Payment REFILL #/Auth State Detail  1 [id number] 01/17/2025 01/17/2025 Zolpidem Tartrate (Tablet, Extended Release) 30.0 30 6.25 MG NA SIMONE GUZMAN [facility] Commercial Insurance 0 / 0 PA   1 [id number] 12/06/2024 12/06/2024 Zolpidem Tartrate (Tablet, Extended Release) 30.0 30 6.25 MG NA [facility] Commercial Insurance 0 / 0 PA   1 [id number] 11/05/2024 08/28/2024 Zolpidem Tartrate (Tablet, Extended Release) 30.0 30 6.25 MG NA [facility] Commercial Insurance 2 / 2 PA   1 [id number] 10/07/2024 08/28/2024 Zolpidem Tartrate (Tablet, Extended Release) 30.0 30 6.25 MG NA [facility] Commercial Insurance 1 / 2 PA   1 [id number] 08/28/2024 08/28/2024 Zolpidem Tartrate (Tablet, Extended Release) 30.0 30 6.25 MG NA [facility] Commercial Insurance 0 / 2 PA   1 [id number] 07/28/2024 05/22/2024 Zolpidem Tartrate (Tablet, Extended Release) 30.0 30 6.25 MG NA [facility] Commercial Insurance 2 / 2 PA   1 [id number] 06/26/2024 05/22/2024 Zolpidem Tartrate (Tablet, Extended Release) 30.0 30 6.25 MG NA [facility] Commercial Insurance 1 / 2 PA   1 [id number] 05/22/2024 05/22/2024 Zolpidem Tartrate (Tablet, Extended Release) 30.0 30 6.25 MG NA [facility] Commercial Insurance 0 / 2 PA   1 [id number] 04/12/2024 01/04/2024 Zolpidem Tartrate (Tablet, Extended Release) 30.0 30 6.25 MG NA SIMONE MALLORY [facility] Commercial Insurance 2 / 2 PA

## 2025-04-08 NOTE — TELEPHONE ENCOUNTER
Will provide refills for now. If he cancels the appointment in July, will not continue to provide refills. Please ensure he is aware of this.

## 2025-04-30 DIAGNOSIS — F51.04 CHRONIC INSOMNIA: ICD-10-CM

## 2025-04-30 DIAGNOSIS — G54.2 CERVICAL NEUROPATHY: ICD-10-CM

## 2025-04-30 DIAGNOSIS — F51.04 PSYCHOPHYSIOLOGICAL INSOMNIA: ICD-10-CM

## 2025-04-30 RX ORDER — GABAPENTIN 300 MG/1
300 CAPSULE ORAL
Qty: 30 CAPSULE | Refills: 0 | Status: SHIPPED | OUTPATIENT
Start: 2025-04-30

## 2025-04-30 RX ORDER — ZOLPIDEM TARTRATE 6.25 MG/1
6.25 TABLET, FILM COATED, EXTENDED RELEASE ORAL
Qty: 30 TABLET | Refills: 0 | Status: SHIPPED | OUTPATIENT
Start: 2025-04-30

## 2025-05-03 DIAGNOSIS — I10 ESSENTIAL HYPERTENSION, BENIGN: ICD-10-CM

## 2025-05-04 RX ORDER — LOSARTAN POTASSIUM 100 MG/1
100 TABLET ORAL DAILY
Qty: 90 TABLET | Refills: 1 | Status: SHIPPED | OUTPATIENT
Start: 2025-05-04

## 2025-05-10 ENCOUNTER — PATIENT MESSAGE (OUTPATIENT)
Dept: INTERNAL MEDICINE CLINIC | Facility: OTHER | Age: 60
End: 2025-05-10

## 2025-05-12 NOTE — PATIENT COMMUNICATION
Pt called back, [employer] will cut off his power today and he needs the letter taken care of as soon as possible. Please advise and notify pt

## 2025-05-12 NOTE — PATIENT COMMUNICATION
[employer] has been called and faxed the form to be completed to our office.  We are waiting for Dr. Magaña to sign it.

## 2025-05-29 DIAGNOSIS — I10 HYPERTENSION, BENIGN: ICD-10-CM

## 2025-05-29 RX ORDER — HYDROCHLOROTHIAZIDE 25 MG/1
25 TABLET ORAL DAILY
Qty: 90 TABLET | Refills: 1 | Status: SHIPPED | OUTPATIENT
Start: 2025-05-29

## 2025-06-18 ENCOUNTER — PATIENT MESSAGE (OUTPATIENT)
Dept: INTERNAL MEDICINE CLINIC | Facility: OTHER | Age: 60
End: 2025-06-18

## 2025-06-18 DIAGNOSIS — F51.04 PSYCHOPHYSIOLOGICAL INSOMNIA: ICD-10-CM

## 2025-06-18 DIAGNOSIS — F51.04 CHRONIC INSOMNIA: ICD-10-CM

## 2025-06-18 DIAGNOSIS — G54.2 CERVICAL NEUROPATHY: ICD-10-CM

## 2025-06-18 RX ORDER — GABAPENTIN 300 MG/1
300 CAPSULE ORAL
Qty: 30 CAPSULE | Refills: 0 | Status: SHIPPED | OUTPATIENT
Start: 2025-06-18

## 2025-06-18 NOTE — TELEPHONE ENCOUNTER
05/07/2025 04/30/2025 Zolpidem Tartrate (Tablet, Extended Release) 30.0 30 6.25 MG NA SIMONE MALLORY [facility] Commercial Insurance 0 / 0 PA   1 [id number] ** 04/08/2025 04/08/2025 Zolpidem Tartrate (Tablet, Extended Release) 30.0 30 6.25 MG NA SIMONE MALLORY [facility] Commercial Insurance 0 / 0 PA   1 [id number] ** 01/17/2025 01/17/2025 Zolpidem Tartrate (Tablet, Extended Release) 30.0 30 6.25 MG NA SIMONE GUZMAN [facility]

## 2025-06-19 RX ORDER — ZOLPIDEM TARTRATE 6.25 MG/1
6.25 TABLET, FILM COATED, EXTENDED RELEASE ORAL
Qty: 30 TABLET | Refills: 0 | Status: SHIPPED | OUTPATIENT
Start: 2025-06-19

## 2025-06-30 DIAGNOSIS — F41.9 ANXIETY: ICD-10-CM

## 2025-06-30 DIAGNOSIS — E78.2 MIXED HYPERLIPIDEMIA: ICD-10-CM

## 2025-06-30 DIAGNOSIS — F33.2 SEVERE EPISODE OF RECURRENT MAJOR DEPRESSIVE DISORDER, WITHOUT PSYCHOTIC FEATURES (HCC): ICD-10-CM

## 2025-06-30 RX ORDER — ATORVASTATIN CALCIUM 40 MG/1
40 TABLET, FILM COATED ORAL DAILY
Qty: 90 TABLET | Refills: 0 | Status: SHIPPED | OUTPATIENT
Start: 2025-06-30

## 2025-06-30 RX ORDER — SERTRALINE HYDROCHLORIDE 100 MG/1
100 TABLET, FILM COATED ORAL DAILY
Qty: 90 TABLET | Refills: 0 | Status: SHIPPED | OUTPATIENT
Start: 2025-06-30

## 2025-07-26 DIAGNOSIS — G54.2 CERVICAL NEUROPATHY: ICD-10-CM

## 2025-07-28 RX ORDER — GABAPENTIN 300 MG/1
300 CAPSULE ORAL
Qty: 30 CAPSULE | Refills: 0 | OUTPATIENT
Start: 2025-07-28

## 2025-08-17 DIAGNOSIS — F51.04 CHRONIC INSOMNIA: ICD-10-CM

## 2025-08-17 DIAGNOSIS — F51.04 PSYCHOPHYSIOLOGICAL INSOMNIA: ICD-10-CM

## 2025-08-17 DIAGNOSIS — G54.2 CERVICAL NEUROPATHY: ICD-10-CM

## 2025-08-18 RX ORDER — ZOLPIDEM TARTRATE 6.25 MG/1
6.25 TABLET, FILM COATED, EXTENDED RELEASE ORAL
Qty: 30 TABLET | Refills: 0 | OUTPATIENT
Start: 2025-08-18

## 2025-08-18 RX ORDER — GABAPENTIN 300 MG/1
300 CAPSULE ORAL
Qty: 30 CAPSULE | Refills: 0 | OUTPATIENT
Start: 2025-08-18